# Patient Record
Sex: MALE | Race: WHITE | NOT HISPANIC OR LATINO | Employment: FULL TIME | URBAN - METROPOLITAN AREA
[De-identification: names, ages, dates, MRNs, and addresses within clinical notes are randomized per-mention and may not be internally consistent; named-entity substitution may affect disease eponyms.]

---

## 2019-04-16 ENCOUNTER — TRANSFERRED RECORDS (OUTPATIENT)
Dept: HEALTH INFORMATION MANAGEMENT | Facility: CLINIC | Age: 59
End: 2019-04-16

## 2019-04-24 ENCOUNTER — MEDICAL CORRESPONDENCE (OUTPATIENT)
Dept: HEALTH INFORMATION MANAGEMENT | Facility: CLINIC | Age: 59
End: 2019-04-24

## 2019-10-01 ENCOUNTER — HEALTH MAINTENANCE LETTER (OUTPATIENT)
Age: 59
End: 2019-10-01

## 2019-11-25 ENCOUNTER — TELEPHONE (OUTPATIENT)
Dept: SURGERY | Facility: CLINIC | Age: 59
End: 2019-11-25

## 2019-11-25 ENCOUNTER — OFFICE VISIT (OUTPATIENT)
Dept: SURGERY | Facility: CLINIC | Age: 59
End: 2019-11-25
Payer: COMMERCIAL

## 2019-11-25 ENCOUNTER — ALLIED HEALTH/NURSE VISIT (OUTPATIENT)
Dept: SURGERY | Facility: CLINIC | Age: 59
End: 2019-11-25
Payer: COMMERCIAL

## 2019-11-25 VITALS
BODY MASS INDEX: 34.17 KG/M2 | WEIGHT: 252.3 LBS | SYSTOLIC BLOOD PRESSURE: 152 MMHG | DIASTOLIC BLOOD PRESSURE: 87 MMHG | HEART RATE: 93 BPM | HEIGHT: 72 IN | OXYGEN SATURATION: 97 % | TEMPERATURE: 98.7 F

## 2019-11-25 DIAGNOSIS — E66.812 OBESITY, CLASS II, BMI 35-39.9: Primary | ICD-10-CM

## 2019-11-25 DIAGNOSIS — E66.9 OBESITY (BMI 30-39.9): ICD-10-CM

## 2019-11-25 DIAGNOSIS — E66.9 OBESITY: ICD-10-CM

## 2019-11-25 RX ORDER — LOSARTAN POTASSIUM 50 MG/1
50 TABLET ORAL DAILY
COMMUNITY
Start: 2019-01-07 | End: 2021-01-21

## 2019-11-25 RX ORDER — PRAVASTATIN SODIUM 40 MG
40 TABLET ORAL EVERY MORNING
COMMUNITY
Start: 2019-07-11 | End: 2021-01-21

## 2019-11-25 ASSESSMENT — PAIN SCALES - GENERAL: PAINLEVEL: NO PAIN (0)

## 2019-11-25 ASSESSMENT — MIFFLIN-ST. JEOR: SCORE: 2007.43

## 2019-11-25 NOTE — PATIENT INSTRUCTIONS
"Welcome to our weight management program!   We are excited to join you on your weight loss journey    Thank you for allowing us the privilege of caring for you. We hope we provided you with the excellent service you deserve.    Please let us know if there is anything else we can do so that we can be sure you are leaving completely satisfied with your care experience.    You saw RORO Hassan CNP today.    Instructions per today's visit:   We will try to get saxenda approved  If not, we will try for contrave   Work with dietitian monthly  See me after January 2020  Consider 24 week plan - I will mychart information     Interested in working with a health ?  Health coaches work with you to improve your overall health and wellbeing.  They look at the whole person, and may involve discussion of different areas of life, including, but not limited to the four pillars of health (sleep, exercise, nutrition, and stress management). Discuss with your care team if you would like to start working a health .  Health Coaching-3 Pack:    $99 for three health coaching visits    Visits may be done in person or via phone    Coaching is a partnership between the  and the client; Coaches do not prescribe or diagnose    Coaching helps inspire the client to reach his/her personal goals   10 Tips for Healthy Holidays:   1. Continue to eat 3 meals daily and avoid snacks. Do not skip meals to \"save\" calories for holiday meal, you will likely overeat.   2. Eat slowly and stop as soon as you are satisfied.   3. Stay away from the buffet table or appetizers prior to the meal. This leads to snacking between meals or filling up on snacks prior to the meal, which can lead to overeating.   4. During the meal eat your protein first, then the vegetables and last the starchy dishes. Again remember to stop as soon as you feel satisfied; it is okay to leave food on the plate.   5. Try water, non-alcoholic wine (does have calories), " crystal light, milan and other low caloric beverage, put them in a fancy glass with a slice of lemon or lime to make them look nice.    6. Drink plenty of water.  7. Bring a dish to share, therefore you know that there will be a healthier option to eat.   8. Continue your current exercise routine, it is easier to fall out of your exercise routine than to get back into the habit of exercising after the holidays. Try to do a family activity outside or ask if anyone wants to take a walk pre or post meal.   9. Be kind to yourself, if you over indulged that's okay, all is not lost. Re-commit to healthy eating habits, forgive yourself and move on.   10. Try new traditions like adding in another vegetable dish or trying a healthier version of family favorites.      For any questions/concerns contact Luz Elena Garcia LPN at [phone] or Saba Gresham RN at [phone]    To schedule appointments with our team, please call [phone]     Please call during clinic hours Monday through Friday 8:00a - 4:00p if you have questions or you can contact us via [employer] at anytime. ?    Lab results will be communicated through My Chart or letter (if My Chart not used). Please call the clinic if you have not received communication after 1 week or if you have any questions.?    Fax: [phone]    Thank you,  Medical Weight Management Team     MEDICATION STARTED AT THIS APPOINTMENT    We are starting a GLP-1 (Glucagon-like Peptide-1) medication called Saxenda. One of the ways it works is by slowing down the rate that food leaves your stomach. You feel wilkins and will eat less. It also helps regulate hormones that can help improve your blood sugars.    If you are a patient that checks blood sugars, continue to check as instructed by your doctor. Low blood sugars are rare but can happen if patients are on insulin or other oral agents. If you notice consistent low sugars or high sugars, your medication may need to be adjusted after your  appointment. If this is the case, please call RN and provide her your blood sugar record from the last 3-4 days. The RN will get in touch with the doctor and call you back/[employer] message with recommendations. We tolerate high sugars for a bit, so if sugars are running 180-200, this is ok. As weight starts dropping the blood sugars should too. If readings are consistently over 200 for 1-2 weeks, then you should call the doctor/nurse.    Dosing for this medication:   Week 1- Inject 0.6 mg daily  Week 2- Inject 1.2 mg daily  Week 3- Inject 1.8 mg daily  Week 4- Inject 2.4 mg daily  Week 5 and thereafter- Inject 3.0 mg daily    Side effects of GLP- Medications include: The most common side effects are all GI related and consist of: nausea, constipation, diarrhea, burping, or gassiness. Patients are advised to eat slowly and less, and nausea typically passes if people can stick it out.     The risk of pancreatitis (inflammation of the pancreas) has been associated with this type of medication, but is very rare.  If you have had pancreatitis in the past, this medication may not be for you. Please let us know about any past history of pancreas problems.    Symptoms of pancreatitis include: Pain in your upper stomach area which may travel to your back and be worse after eating. Your stomach area may be tender to the touch.  You may have vomiting or nausea and/or have a fever. If you should develop any of these symptoms, stop the medication and contact your primary care doctor. They will do a blood test to check for pancreatitis.         There is a small chance you may have some low blood sugar after taking the medication.   The signs of low blood sugar are:  o Weakness  o Shaky   o Hungry  o Sweating  o Confusion      See below for ways to treat low blood sugar without adding in lots of extra calories.      Treating Low Blood Sugar    If you have symptoms of low blood sugar (sweating, shaking, dizzy, confused) eat 15 grams  of carbs and wait 15 minutes:      Glucose Tabs are best for sugars under 70 -  Dex4 or BD Glucose tablets are good, you will need to take 3-4 of these to equal 15 grams.       One small box of raisins    4 oz fruit juice box or   cup fruit juice    1 small apple    1 small banana      cup canned fruit in water      English muffin or a slice of bread with jelly     1 low fat frozen waffle with sugar-free syrup      cup cottage cheese with   cup frozen or fresh blueberries    1 cup skim or low-fat milk      cup whole grain cereal    4-6 crackers such as Triscuits      This medication is usually not covered by insurance and can be quite expensive. Sometimes a prior authorization is required, which may take up to 1-2 weeks for an insurance company to make a decision if they will cover the medication. Please be patient, you will be notified after a decision has been made.    Call the nurse at [phone] if you have any questions or concerns. (Do not stop taking it if you don't think it's working. For some people it works without them knowing it.)     In order to get refills of this or any medication we prescribe you must be seen in the medical weight mgmt clinic every 2-4 months. Please have your pharmacy fax a refill request to [phone].

## 2019-11-25 NOTE — PROGRESS NOTES
"    New Medical Weight Management Consult    PATIENT:  Speedy Carlson  MRN:         [id number]  :         1960  CAMILO:         2019    Dear Dr. Mcmahon ([facility])     I had the pleasure of seeing your patient, Speedy Carlson.  Full intake/assessment done to determine barriers to weight loss success and develop a treatment plan.  Speedy Carlson is a 58 year old male interested in treatment of medical problems associated with weight.  His weight today is 252 lbs 4.8 oz, Body mass index is 34.22 kg/m ., and he has the following co-morbidities:     2019   I have the following co-morbidities associated with obesity: Heart Disease, High Blood Pressure, High Cholesterol, Sleep Apnea     HANNAH: CPAP prescribed, used for a while but didn't tolerate   HTN: just in the last few months, started losartan otherwise it has been well control   Fasting blood sugar 99 2019      Patient Goals Reviewed With Patient 2019   I am interested in attaining a healthier weight to diminish current health problems related to co-morbid conditions: Yes   I am interested in attaining a healthier weight in order to prevent future health problems: Yes       Referring Provider 2019   Please name the provider who referred you to Medical Weight Management.  If you do not know, please answer: \"I Don't Know\". N/A       Wt Readings from Last 4 Encounters:   19 114.4 kg (252 lb 4.8 oz)   14 98.8 kg (217 lb 14.4 oz)   13 98.7 kg (217 lb 11.2 oz)   08/10/10 102.2 kg (225 lb 3.2 oz)       Weight History Reviewed With Patient 2019   How concerned are you about your weight? Very Concerned   Would you describe your weight gain as gradual? Yes   I became overweight: As an Adult   The following factors have contributed to my weight gain:  Eating Wrong Types of Food, Eating Too Much, Lack of Exercise, Genetic (Runs in the Family)   I have tried the following methods to lose weight: Exercise, Medications   I have " the following family history of obesity/being overweight:  My mother is overwieght, One or more of my siblings are overweight   Has anyone in your family had weight loss surgery? No     Around age 41- started struggling  Was running pretty consistently up until 2 years ago   Accident with disc injury in the last year decrease mobility   Highest weight in life currently     A few years ago, lost 20lbs with diet/ exercise and taking contrave over 5 months - had some teeth grinding while taking this, has since stopped     Wants to prevent further co morbidities and feel healthier overall     Diet Recall Reviewed With Patient 11/25/2019   How many glasses of juice do you drink in a typical day? 1   How many of glasses of milk do you drink in a typical day? 0   How many 8oz glasses of sugar containing drinks such as Doug-Aid/sweet tea do you drink in a day? 0   How many cans/bottles of sugar pop/soda/tea/sports drinks do you drink in a day? 0   How many cans/bottles of diet pop/soda/tea or sports drink do you drink in a day? 1   How often do you have a drink of alcohol? 2-4 Times a Month   If you do drink, how many drinks might you have in a day? 5-6         Eating Habits Reviewed With Patient 11/25/2019   Generally, my meals include foods like these: bread, pasta, rice, potatoes, corn, crackers, sweet dessert, pop, or juice. Everyday   Generally, my meals include foods like these: fried meats, brats, burgers, french fries, pizza, cheese, chips, or ice cream. A Few Times a Week   Eat fast food (like [employer], [employer], Taco Bell). Never   Eat at a buffet or sit-down restaurant. A Few Times a Week   Eat most of my meals in front of the TV or computer. Half of the Week   Often skip meals, eat at random times, have no regular eating times. A Few Times a Week   Rarely sit down for a meal but snack or graze throughout.  A Few Times a Week   Eat extra snacks between meals. Almost Everyday   Eat most of my food at the end  of the day. Almost Everyday   Eat in the middle of the night or wake up at night to eat. Never   Eat extra snacks to prevent or correct low blood sugar. Never   Eat to prevent acid reflux or stomach pain. Never   Worry about not having enough food to eat. Never   Have you been to the food shelf at least a few times this year? No   I eat when I am depressed, stressed, anxious, or bored. A Few Times a Week   I eat when I am happy or as a reward. A Few Times a Week   I feel hungry all the time even if I just have eaten. Almost Everyday   Feeling full is important to me. Almost Everyday   Once I start eating, it is hard to stop. Everyday   I finish all the food on my plate even if I am already full. Everyday   I can't resist eating delicious food or walk past the good food/smell. A Few Times a Week   I eat/snack without noticing that I am eating. Never   I eat when I am preparing the meal. Never   I eat more than usual when I see others eating. Never   I have trouble not eating sweets, ice cream, cookies, or chips if they are around the house. Everyday   I think about food all day. Never   What foods, if any, do you crave? Sweets/Candy/Chocolate   Please list any other foods you crave? Pasta, pizza, tacos, asian, sweets. I believe I have a sugar addition.   I feel out of control when eating. Almost Everyday   I eat a large amount of food, like a loaf of bread, a box of cookies, a pint/quart of ice cream, all at once. Never   I eat a large amount of food even when I am not hungry. Monthly   I eat rapidly. Almost Everyday   I eat alone because I feel embarrassed and do not want others to see how much I have eaten. Monthly   I eat until I am uncomfortably full. Weekly   I feel bad, disgusted, or guilty after I overeat. Almost Everyday   I make myself vomit what I have eaten or use laxatives to get rid of food. Never     Protein shake in the breakfast during the week, out for breakfast on the weekends   Eats lunch and  "dinner   Portion control   Craves sugar after meals  Feels \"addicted to sugar\"   Craves chocolate and cookies  Cravings worse after certain meals   Doesn't \"like a lot of food\" - loves pasta and pizza, eats a lot of sandwiches     Cardiology screening in the past recommended red meat 3 times a month or less, decrease carbohydrates       Activity/Exercise History Reviewed With Patient 11/25/2019   How much of a typical 12 hour day do you spend sitting? Most of the Day   How much of a typical 12 hour day do you spend lying down? Less Than Half the Day   How much of a typical day do you spend walking/standing? Less Than Half the Day   How many hours (not including work) do you spend on the TV/Video Games/Computer/Tablet/Phone? 4-5 Hours   How many times a week are you active for the purpose of exercise? 2-3 Times a Week   How many total minutes do you spend doing some activity for the purpose of exercising when you exercise? More Than 30 Minutes   What keeps you from being more active? Other       PAST MEDICAL HISTORY:  Past Medical History:   Diagnosis Date     Acute prostatitis 12/04     Calculus of kidney 1994    no recurrence     Mild intermittent asthma     hx in childhood     Mixed hyperlipidemia      Other acne      Varicella without mention of complication        Work/Social History Reviewed With Patient 11/25/2019   My employment status is: Full-Time   My job is:    How much of your job is spent on the computer or phone? 75%   What is your marital status? /In a Relationship   If in a relationship, is your significant other overweight? Yes   Do you have children? Yes   If you have children, are they overweight? No       Mental Health History Reviewed With Patient 11/25/2019   Have you ever been physically or sexually abused? No   How often in the past 2 weeks have you felt little interest or pleasure in doing things? Not at all   Over the past 2 weeks how often have you felt down, " depressed, or hopeless? Not at all       Sleep History Reviewed With Patient 11/25/2019   How many hours do you sleep at night? 7   Do you think that you snore loudly or has anybody ever heard you snore loudly (louder than talking or so loud it can be heard behind a shut door)? Yes   Has anyone seen or heard you stop breathing during your sleep? Yes   Do you often feel tired, fatigued, or sleepy during the day? Yes       MEDICATIONS:   Current Outpatient Medications   Medication Sig Dispense Refill     aspirin 81 MG tablet Take 81 mg by mouth daily       fenofibrate 145 MG tablet Take  by mouth daily.       liraglutide - Weight Management (SAXENDA) 18 MG/3ML pen Week 1: 0.6 mg subcutaneous daily, Week 2: 1.2 mg daily, Week 3: 1.8 mg daily, Week 4: 2.4 mg daily, Week 5 & on: 3 mg daily 15 mL 1     losartan (COZAAR) 50 MG tablet Take 50 mg by mouth       pravastatin (PRAVACHOL) 40 MG tablet Take 40 mg by mouth       zolpidem (AMBIEN) 5 MG tablet   1     ambien once or twice a week     ALLERGIES:   Allergies   Allergen Reactions     No Known Allergies        PHYSICAL EXAM:  BP (!) 152/87 (BP Location: Left arm, Patient Position: Sitting, Cuff Size: Adult Large)   Pulse 93   Temp 98.7  F (37.1  C) (Oral)   Ht 1.829 m (6')   Wt 114.4 kg (252 lb 4.8 oz)   SpO2 97%   BMI 34.22 kg/m     A & O x 3  HEENT: NCAT, mucous membranes moist  Respirations unlabored  Location of obesity: Central Obesity    ASSESSMENT:  Speedy is a patient with mature onset obesity with significant element of familial/genetic influence and with current health consequences. He does need aggressive weight loss plan due to BMI 32, family history, comorbidities.  Speedy Carlson eats a high carb diet, eats a high fat diet and mostly eats during the evening.    His problem is complicated by strong craving/reward pathways    His ability to lose weight is impacted by lack of education on nutrition and dietary needs.    PLAN:    No meal  skipping  Dietician visit of education  Meal planning  Sleep apnea adherence encouraged    Aggressive  Ancillary testing:  Encouraged CPAP and sleep hygiene .  Food Plan:  Dietitian monthly .   Activity Plan:  Continue to work on increasing frequency .  Supplementary: discussed 24 week plan, discussed health coaching  Medication:  The patient will begin medication in pursuit of improved medical status as influenced by body weight. He will start saxenda.  There is a mutual understanding of the goals and risks of this therapy. The patient is in agreement. He is educated on dosage regimen and possible side effects.    Did discuss possible contrave if saxenda not covered. Did notice benefit from contrave in the past but had some night time teeth grinding with it. Does have a mouth guard now that he notes he could wear if it was an issue. Considered topiramate but has a distant history of kidney stones (once 25 years ago), could consider in the future. Newer HTN and 58, would not choose phentermine first.     No orders of the defined types were placed in this encounter.      RTC:    8 weeks.    TIME: 45 min spent on evaluation, management, counseling, education, & motivational interviewing with greater than 50 % of the total time was spent on counseling and coordinating care    Sincerely,    Shaila Wooten NP

## 2019-11-25 NOTE — PROGRESS NOTES
New Weight Management Nutrition Consultation    Speedy Carlson is a 58 year old male presents today for new weight management nutrition consultation.  Patient referred by Shaila Wooten NP on November 25, 2019.    Patient with Co-morbidities of obesity including:  Type II DM no  Renal Failure no  Sleep apnea no  Hypertension no   Dyslipidemia yes  Joint pain no  Back pain no  GERD no     Anthropometrics:  Estimated body mass index is 34.22 kg/m  as calculated from the following:    Height as of an earlier encounter on 11/25/19: 1.829 m (6').    Weight as of an earlier encounter on 11/25/19: 114.4 kg (252 lb 4.8 oz).    Medications for Weight Loss:   Saxenda - starting today     NUTRITION HISTORY  See MD note for details.    Pt reports he craves sugar, he believes that he is addicted to sugar. After lunch or dinner (once a day) he feels as though he needs to have something sweet; he will eat cookies or 6-7 pieces of chocolate. He craves the sweets about 15 minutes after his meal, if he does not have sweet right after the meal he does not need to have any sweets. Notices that he wants sweets more after a spicy meal.     He is aware that he will consume larger portion sizes, he does not think about food during the day, however, if there is food sitting in front of him he will eat it.      His wife does not cook, he does not mind cooking but does not do it often. Many times they will go out to eat or have take out.    Recent food recall:  Breakfast: 3 oz OJ a protein shake Week: mcdaniels and eggs.   Lunch: bagel with cheese turkey and veggies and a bag of chips   Dinner: pizza pasta, asian, take out (do not do a lot of cooking) wife does not cook at all. Taco   Could eat the same thing everyday, food does not interest me.   Snacks: chocolate or sweet once a day.   Beverages: Bubbly, not much soda ( one per day if that) diet coke, water   Alcohol: once a week.   Dining out: daily     MALNUTRITION  % Intake: No decreased  "intake noted  % Weight Loss: None noted  Subcutaneous Fat Loss: None observed  Muscle Loss: None observed  Fluid Accumulation/Edema: None noted  Malnutrition Diagnosis: Patient does not meet two of the established criteria necessary for diagnosing malnutrition    Nutrition Prescription  Recommended energy/nutrient modification.    Nutrition Diagnosis  Obesity r/t long history of self-monitoring deficit and excessive energy intake aeb BMI >30.    Nutrition Intervention  Materials/education provided on Volumetric eating to help satiety level on fewer calories; portion control and healthy food choices (Plate Method and Volumetrics handouts), 100 calorie snack choices, meal and snack planning and websites, sample meal plans.    Patient Understanding: good  Expected Compliance: good  Follow-Up Plans: 1 month      Nutrition Goals  1) Distract yourself 15 minutes, (read a book, do laundry, watch tv, call friends)  2) limits sweets 1-2 per sitting   3) Eating our meals slowly 20-30 minutes  4) 1/2 your meal should be non starchy vegetables.   5) Box up half meal when going out to eat.     https://[employer]/soh-eq-ibium-vegetables/  Healthy Recipe Resources:  \"The Volumetrics Eating Plan\" by Shonda Naylor, Ph.D.  www.[employer].com  www.[employer]  www.[employer].org  Dash Diet Recipes [facility]  www.extension.[facility] - the recipe box  \"Cooking that Counts\" by editors of [employer]  https://www.[facility]/communityculinary/[facility]_Cookbook_KT_Final_11-6_NoCrops-compressed.pdf      Follow-Up:  1 month     Time spent with patient: 30 minutes.  Waleska Rivera, MS, RD, LD      "

## 2019-11-25 NOTE — PATIENT INSTRUCTIONS
"Nutrition Goals  1) Distract yourself 15 minutes, (read a book, do laundry, watch tv, call friends)  2) limits sweets 1-2 per sitting   3) Eating our meals slowly 20-30 minutes  4) 1/2 your meal should be non starchy vegetables.   5) Box up half meal when going out to eat.     https://[employer]/qje-xp-rtkag-vegetables/  Healthy Recipe Resources:  \"The Volumetrics Eating Plan\" by Shonda Naylor, Ph.D.  www.[employer].com  www.[employer]  www.[employer]  Dash Diet Recipes [facility]  www.extension.[facility] - the recipe box  \"Cooking that Counts\" by editors of [employer]  https://www.[facility]/communityculinary/[facility]_Cookbook_KT_Final_11-6_NoCrops-compressed.pdf    Follow up with RD in one month    Waleska Rivera, MS, RD, LD  If you need to schedule or reschedule with a dietitian please call [phone].  "

## 2019-11-25 NOTE — LETTER
"2019       RE: Speedy Carlson  [address] [facility] [address]     Dear Colleague,    Thank you for referring your patient, Speedy Carlson, to the [facility] SURGICAL WEIGHT MANAGEMENT at [facility] acute [facility]. Please see a copy of my visit note below.        New Medical Weight Management Consult    PATIENT:  Speedy Carlson  MRN:         [id number]  :         1960  CAMILO:         2019    Dear Dr. Mcmahon ([facility])     I had the pleasure of seeing your patient, Speedy Carlson.  Full intake/assessment done to determine barriers to weight loss success and develop a treatment plan.  Speedy Carlson is a 58 year old male interested in treatment of medical problems associated with weight.  His weight today is 252 lbs 4.8 oz, Body mass index is 34.22 kg/m ., and he has the following co-morbidities:     2019   I have the following co-morbidities associated with obesity: Heart Disease, High Blood Pressure, High Cholesterol, Sleep Apnea     HANNAH: CPAP prescribed, used for a while but didn't tolerate   HTN: just in the last few months, started losartan otherwise it has been well control   Fasting blood sugar 99 2019      Patient Goals Reviewed With Patient 2019   I am interested in attaining a healthier weight to diminish current health problems related to co-morbid conditions: Yes   I am interested in attaining a healthier weight in order to prevent future health problems: Yes       Referring Provider 2019   Please name the provider who referred you to Medical Weight Management.  If you do not know, please answer: \"I Don't Know\". N/A       Wt Readings from Last 4 Encounters:   19 114.4 kg (252 lb 4.8 oz)   14 98.8 kg (217 lb 14.4 oz)   13 98.7 kg (217 lb 11.2 oz)   08/10/10 102.2 kg (225 lb 3.2 oz)       Weight History Reviewed With Patient 2019   How concerned are you about your weight? Very Concerned   Would you " describe your weight gain as gradual? Yes   I became overweight: As an Adult   The following factors have contributed to my weight gain:  Eating Wrong Types of Food, Eating Too Much, Lack of Exercise, Genetic (Runs in the Family)   I have tried the following methods to lose weight: Exercise, Medications   I have the following family history of obesity/being overweight:  My mother is overwieght, One or more of my siblings are overweight   Has anyone in your family had weight loss surgery? No     Around age 41- started struggling  Was running pretty consistently up until 2 years ago   Accident with disc injury in the last year decrease mobility   Highest weight in life currently     A few years ago, lost 20lbs with diet/ exercise and taking contrave over 5 months - had some teeth grinding while taking this, has since stopped     Wants to prevent further co morbidities and feel healthier overall     Diet Recall Reviewed With Patient 11/25/2019   How many glasses of juice do you drink in a typical day? 1   How many of glasses of milk do you drink in a typical day? 0   How many 8oz glasses of sugar containing drinks such as Doug-Aid/sweet tea do you drink in a day? 0   How many cans/bottles of sugar pop/soda/tea/sports drinks do you drink in a day? 0   How many cans/bottles of diet pop/soda/tea or sports drink do you drink in a day? 1   How often do you have a drink of alcohol? 2-4 Times a Month   If you do drink, how many drinks might you have in a day? 5-6         Eating Habits Reviewed With Patient 11/25/2019   Generally, my meals include foods like these: bread, pasta, rice, potatoes, corn, crackers, sweet dessert, pop, or juice. Everyday   Generally, my meals include foods like these: fried meats, brats, burgers, french fries, pizza, cheese, chips, or ice cream. A Few Times a Week   Eat fast food (like McDonalds, BurSnapcious Minh, Taco Bell). Never   Eat at a buffet or sit-down restaurant. A Few Times a Week   Eat most  of my meals in front of the TV or computer. Half of the Week   Often skip meals, eat at random times, have no regular eating times. A Few Times a Week   Rarely sit down for a meal but snack or graze throughout.  A Few Times a Week   Eat extra snacks between meals. Almost Everyday   Eat most of my food at the end of the day. Almost Everyday   Eat in the middle of the night or wake up at night to eat. Never   Eat extra snacks to prevent or correct low blood sugar. Never   Eat to prevent acid reflux or stomach pain. Never   Worry about not having enough food to eat. Never   Have you been to the food shelf at least a few times this year? No   I eat when I am depressed, stressed, anxious, or bored. A Few Times a Week   I eat when I am happy or as a reward. A Few Times a Week   I feel hungry all the time even if I just have eaten. Almost Everyday   Feeling full is important to me. Almost Everyday   Once I start eating, it is hard to stop. Everyday   I finish all the food on my plate even if I am already full. Everyday   I can't resist eating delicious food or walk past the good food/smell. A Few Times a Week   I eat/snack without noticing that I am eating. Never   I eat when I am preparing the meal. Never   I eat more than usual when I see others eating. Never   I have trouble not eating sweets, ice cream, cookies, or chips if they are around the house. Everyday   I think about food all day. Never   What foods, if any, do you crave? Sweets/Candy/Chocolate   Please list any other foods you crave? Pasta, pizza, tacos, asian, sweets. I believe I have a sugar addition.   I feel out of control when eating. Almost Everyday   I eat a large amount of food, like a loaf of bread, a box of cookies, a pint/quart of ice cream, all at once. Never   I eat a large amount of food even when I am not hungry. Monthly   I eat rapidly. Almost Everyday   I eat alone because I feel embarrassed and do not want others to see how much I have eaten.  "Monthly   I eat until I am uncomfortably full. Weekly   I feel bad, disgusted, or guilty after I overeat. Almost Everyday   I make myself vomit what I have eaten or use laxatives to get rid of food. Never     Protein shake in the breakfast during the week, out for breakfast on the weekends   Eats lunch and dinner   Portion control   Craves sugar after meals  Feels \"addicted to sugar\"   Craves chocolate and cookies  Cravings worse after certain meals   Doesn't \"like a lot of food\" - loves pasta and pizza, eats a lot of sandwiches     Cardiology screening in the past recommended red meat 3 times a month or less, decrease carbohydrates       Activity/Exercise History Reviewed With Patient 11/25/2019   How much of a typical 12 hour day do you spend sitting? Most of the Day   How much of a typical 12 hour day do you spend lying down? Less Than Half the Day   How much of a typical day do you spend walking/standing? Less Than Half the Day   How many hours (not including work) do you spend on the TV/Video Games/Computer/Tablet/Phone? 4-5 Hours   How many times a week are you active for the purpose of exercise? 2-3 Times a Week   How many total minutes do you spend doing some activity for the purpose of exercising when you exercise? More Than 30 Minutes   What keeps you from being more active? Other       PAST MEDICAL HISTORY:  Past Medical History:   Diagnosis Date     Acute prostatitis 12/04     Calculus of kidney 1994    no recurrence     Mild intermittent asthma     hx in childhood     Mixed hyperlipidemia      Other acne      Varicella without mention of complication        Work/Social History Reviewed With Patient 11/25/2019   My employment status is: Full-Time   My job is:    How much of your job is spent on the computer or phone? 75%   What is your marital status? /In a Relationship   If in a relationship, is your significant other overweight? Yes   Do you have children? Yes   If you have " children, are they overweight? No       Mental Health History Reviewed With Patient 11/25/2019   Have you ever been physically or sexually abused? No   How often in the past 2 weeks have you felt little interest or pleasure in doing things? Not at all   Over the past 2 weeks how often have you felt down, depressed, or hopeless? Not at all       Sleep History Reviewed With Patient 11/25/2019   How many hours do you sleep at night? 7   Do you think that you snore loudly or has anybody ever heard you snore loudly (louder than talking or so loud it can be heard behind a shut door)? Yes   Has anyone seen or heard you stop breathing during your sleep? Yes   Do you often feel tired, fatigued, or sleepy during the day? Yes       MEDICATIONS:   Current Outpatient Medications   Medication Sig Dispense Refill     aspirin 81 MG tablet Take 81 mg by mouth daily       fenofibrate 145 MG tablet Take  by mouth daily.       liraglutide - Weight Management (SAXENDA) 18 MG/3ML pen Week 1: 0.6 mg subcutaneous daily, Week 2: 1.2 mg daily, Week 3: 1.8 mg daily, Week 4: 2.4 mg daily, Week 5 & on: 3 mg daily 15 mL 1     losartan (COZAAR) 50 MG tablet Take 50 mg by mouth       pravastatin (PRAVACHOL) 40 MG tablet Take 40 mg by mouth       zolpidem (AMBIEN) 5 MG tablet   1     ambien once or twice a week     ALLERGIES:   Allergies   Allergen Reactions     No Known Allergies        PHYSICAL EXAM:  BP (!) 152/87 (BP Location: Left arm, Patient Position: Sitting, Cuff Size: Adult Large)   Pulse 93   Temp 98.7  F (37.1  C) (Oral)   Ht 1.829 m (6')   Wt 114.4 kg (252 lb 4.8 oz)   SpO2 97%   BMI 34.22 kg/m      A & O x 3  HEENT: NCAT, mucous membranes moist  Respirations unlabored  Location of obesity: Central Obesity    ASSESSMENT:  Speedy is a patient with mature onset obesity with significant element of familial/genetic influence and with current health consequences. He does need aggressive weight loss plan due to BMI 32, family history,  comorbidities.  Speedy Carlson eats a high carb diet, eats a high fat diet and mostly eats during the evening.    His problem is complicated by strong craving/reward pathways    His ability to lose weight is impacted by lack of education on nutrition and dietary needs.    PLAN:    No meal skipping  Dietician visit of education  Meal planning  Sleep apnea adherence encouraged    Aggressive  Ancillary testing:  Encouraged CPAP and sleep hygiene .  Food Plan:  Dietitian monthly .   Activity Plan:  Continue to work on increasing frequency .  Supplementary: discussed 24 week plan, discussed health coaching  Medication:  The patient will begin medication in pursuit of improved medical status as influenced by body weight. He will start saxenda.  There is a mutual understanding of the goals and risks of this therapy. The patient is in agreement. He is educated on dosage regimen and possible side effects.    Did discuss possible contrave if saxenda not covered. Did notice benefit from contrave in the past but had some night time teeth grinding with it. Does have a mouth guard now that he notes he could wear if it was an issue. Considered topiramate but has a distant history of kidney stones (once 25 years ago), could consider in the future. Newer HTN and 58, would not choose phentermine first.     No orders of the defined types were placed in this encounter.      RTC:    8 weeks.    TIME: 45 min spent on evaluation, management, counseling, education, & motivational interviewing with greater than 50 % of the total time was spent on counseling and coordinating care    Sincerely,    Shaila Wooten NP        Again, thank you for allowing me to participate in the care of your patient.      Sincerely,    Shaila Wooten NP

## 2019-11-25 NOTE — LETTER
Date:November 27, 2019      Patient was self referred, no letter generated. Do not send.        [facility] Health Information

## 2019-11-25 NOTE — TELEPHONE ENCOUNTER
Prior Authorization Retail Medication Request    Medication/Dose: Saxenda  ICD code (if different than what is on RX):    Previously Tried and Failed:  Exercise, Medications  Rationale:  Heart Disease, High Blood Pressure, High Cholesterol, Sleep Apnea    Insurance Name:  -[employer] OPEN ACCESS Ph: [phone]   Insurance ID:[id number]        Pharmacy Information (if different than what is on RX)  Name:  [id number] [address]  Phone:  [phone]

## 2019-11-25 NOTE — NURSING NOTE
(   Chief Complaint   Patient presents with     Consult     New weight management visit.    )    ( Weight: 114.4 kg (252 lb 4.8 oz) )  ( Height: 182.9 cm (6') )  ( BMI (Calculated): 34.22 )  ( Initial Weight: 114.4 kg (252 lb 4.8 oz) )  ( Cumulative weight loss (lbs): 0 )  (   )  (   )  ( Waist Circumference (cm): 121 cm )  (   )    ( BP: (!) 152/87 )  (   )  ( Temp: 98.7  F (37.1  C) )  ( Temp src: Oral )  ( Pulse: 93 )  (   )  ( SpO2: 97 % )    (   Patient Active Problem List   Diagnosis     Other acne     Mixed hyperlipidemia     Prostatitis     Tear film insufficiency     HYPERLIPIDEMIA LDL GOAL <130    )  (   Current Outpatient Medications   Medication Sig Dispense Refill     aspirin 81 MG tablet Take 81 mg by mouth daily       fenofibrate 145 MG tablet Take  by mouth daily.       losartan (COZAAR) 50 MG tablet Take 50 mg by mouth       pravastatin (PRAVACHOL) 40 MG tablet Take 40 mg by mouth       zolpidem (AMBIEN) 5 MG tablet   1    )  ( Diabetes Eval:    )    ( Pain Eval:  No Pain (0) )    ( Wound Eval:       )    (   History   Smoking Status     Never Smoker   Smokeless Tobacco     Never Used     Comment: cigar occ    )    ( Signed By:  Elo Joshi CMA; November 25, 2019; 12:18 PM )

## 2019-11-26 NOTE — TELEPHONE ENCOUNTER
PA Initiation    Medication: liraglutide - Weight Management (SAXENDA) 18 MG/3ML pen  Insurance Company: [employer] - Phone [phone] Fax [phone]  Pharmacy Filling the Rx: [id number] - [place], [place] - [address] [place] [address]  Filling Pharmacy Phone: [phone]  Filling Pharmacy Fax: [phone]  Start Date: 11/26/2019

## 2019-11-30 NOTE — TELEPHONE ENCOUNTER
PRIOR AUTHORIZATION DENIED    Medication: liraglutide - Weight Management (SAXENDA) 18 MG/3ML pen - DENIED    Denial Date: 11/29/2019    Denial Rational: Patient does NOT meet criteria details below    Appeal Information: Eligible for appeal within 180 days of notice

## 2020-01-02 ENCOUNTER — TELEPHONE (OUTPATIENT)
Dept: ENDOCRINOLOGY | Facility: CLINIC | Age: 60
End: 2020-01-02

## 2020-01-02 ENCOUNTER — ALLIED HEALTH/NURSE VISIT (OUTPATIENT)
Dept: PHARMACY | Facility: CLINIC | Age: 60
End: 2020-01-02
Payer: COMMERCIAL

## 2020-01-02 DIAGNOSIS — E66.09 CLASS 1 OBESITY DUE TO EXCESS CALORIES WITH SERIOUS COMORBIDITY AND BODY MASS INDEX (BMI) OF 34.0 TO 34.9 IN ADULT: Primary | ICD-10-CM

## 2020-01-02 DIAGNOSIS — E66.811 CLASS 1 OBESITY DUE TO EXCESS CALORIES WITH SERIOUS COMORBIDITY AND BODY MASS INDEX (BMI) OF 34.0 TO 34.9 IN ADULT: Primary | ICD-10-CM

## 2020-01-02 DIAGNOSIS — G47.00 INSOMNIA, UNSPECIFIED TYPE: ICD-10-CM

## 2020-01-02 DIAGNOSIS — I10 BENIGN ESSENTIAL HYPERTENSION: ICD-10-CM

## 2020-01-02 DIAGNOSIS — Z79.82 ASPIRIN LONG-TERM USE: ICD-10-CM

## 2020-01-02 DIAGNOSIS — E78.5 HYPERLIPIDEMIA LDL GOAL <130: ICD-10-CM

## 2020-01-02 PROCEDURE — 99605 MTMS BY PHARM NP 15 MIN: CPT | Performed by: PHARMACIST

## 2020-01-02 PROCEDURE — 99607 MTMS BY PHARM ADDL 15 MIN: CPT | Performed by: PHARMACIST

## 2020-01-02 RX ORDER — TRAZODONE HYDROCHLORIDE 50 MG/1
150 TABLET, FILM COATED ORAL AT BEDTIME
COMMUNITY
Start: 2019-12-07 | End: 2021-01-21

## 2020-01-02 NOTE — PROGRESS NOTES
"SUBJECTIVE/OBJECTIVE:                           Speedy Carlson is a 59 year old male called for an initial visit for Medication Therapy Management.  He was referred to me from Shaila Wooten CNP.    Chief Complaint: Would like to discuss next steps for weight loss medications.    Allergies/ADRs: Reviewed in Epic  Tobacco:  reports that he has never smoked. He has never used smokeless tobacco.  Alcohol: 1 beverage every 1-2 weeks  Caffeine: 1 cups/day of coffee  Activity: see below  PMH: Reviewed in Epic    Medication Adherence/Access:  Patient uses pill box(es).  Patient takes medications 2 time(s) per day.   Per patient, misses medication 0 times per week. He states that he has a good regimen and never misses doses of medication.     Obesity:  No medications currently.     Followed by Shaila Wooten NP, last seen 11/25/2019 for New Medical Weight Management. Was prescribed Saxenda, but difficultly tolerating due to nausea/abdominal pain so stopped. He did give try in changing time of day and going back to lower dose of Saxenda but still didn't tolerate, so stopped. This was discussed with team and team aware.  Previously tried Contrave. He founds that Contrave worked very well, lost 25 lb but found that he was grinding teeth and was bothered by this. Got night gaurd for period of time, but stopped medication for short period of time. He does have history of singular incident of kidney stone 25 years ago. He has not tried other medication(s) for weight loss.   Weight History: Around age 41 started struggling with weight loss. He worries about his health due to family history of heart issues. He wants to try and get healthier due to this.  Diet/Eating Habits: Patient reports that for him his largest issues are cravings/snacking. He feels that he has an \"almost addiction to sugars\".    Exercise/Activity: Patient reports that he tries to work out 3 times weekly for 1 hour each, ellilptical usually. He will also lift weights " during that times.    Failed medications include: Saxenda: GI upset, nausea, abdominal pain.     Initial Consult Weight 11/25/2019: 252 lb 4.8 oz     Wt Readings from Last 4 Encounters:   11/25/19 252 lb 4.8 oz (114.4 kg)   08/12/14 217 lb 14.4 oz (98.8 kg)   01/04/13 217 lb 11.2 oz (98.7 kg)   08/10/10 225 lb 3.2 oz (102.2 kg)     Estimated body mass index is 34.22 kg/m  as calculated from the following:    Height as of 11/25/19: 6' (1.829 m).    Weight as of 11/25/19: 252 lb 4.8 oz (114.4 kg).    Hypertension:   Losartan 50 mg daily.     Patient does self-monitor BP. Home BP monitoring in range of 130's systolic over 80's diastolic. Rarely will be 140s systolic. Patient reports no current medication side effects.  BP Readings from Last 3 Encounters:   11/25/19 (!) 152/87   08/12/14 128/84   01/04/13 134/78     Hyperlipidemia/Primary Prevention:   Pravastatin 40mg once daily    Fenofibrate 145 mg daily   Aspirin 81 mg daily     Pt reports no significant myalgias or other side effects. He says he is going to have physical scheduled with PCP and to get labs rechecked. Brother has history of heart attack, 5 vessel CABG. Another brother was found to have mild coronary artery disease and coronary spasm. Mother suffered 2 heart attacks and stenting in last 60s/early 70s, also had 2 TIAs.  No issues of bleeding or bruising.      No results for input(s): CHOL, HDL, LDL, TRIG, CHOLHDLRATIO in the last 50069 hours.  Last lipid panel from care everywhere was from 1/12/2018.     Insomnia:   Zolpidem 5 mg nightly PRN.   Trazodone 100 mg nightly.     Gets about 7 hours of sleep each night. He also has sleep apnea. Sometimes uses a CPAP, he finds that it is uncomfortable to wear. He has been waiting to talk to sleep medicine to see if he can get a new mask as he has new insurance at the beginning of the year.     Today's Vitals: There were no vitals taken for this visit. Telemedicine visit. No vitals.     ASSESSMENT:                             Medication Adherence: good, no issues identified    Obesity: Needs improvement. Would benefit from retrial Contrave as unclear of relation of teeth grinding and Contrave and patient willing to retrial. Future: could consider topiramate, did discuss risk of kidney stone formation due to history and necessity of adequate hydration if this is the path in the future.     Hypertension: Unimproved. Would benefit from BP repeat, would be completed at next clinic appointments. Goal BP <140/90 mmHg, patient's last BP was above goal but at homes are normal. To re-eval at next clinic appointment.     Hyperlipidemia/Primary Prevention: Unimproved. Is due for lipid re-eval. Reasonable to continue aspirin use due to family history and no side effects at this time.     Insomnia: Unimproved. Patient would benefit from follow up with sleep medicine to find a more suitable mask. May end up requiring less of trazodone in the future if able to find more comfortable mask.     PLAN:                            1. Check in with your sleep medicine provider to see if there is an alternative mask that is more comfortable so you can effectively utilize your CPAP    2. Can schedule follow up with primary care provider - looks like you are due for labs: lipids, CMP, etc.     3. Will send in prescription for Contrave 8-90 mg tablet titration today - let clinic know if you have coverage issues or questions or concerns.     Week 1: 1 tablet daily in AM   Week 2: 1 tablet BID (AM and early PM)   Week 3: 2 tablets in AM and 1 tablet in early PM  Week 4 & on: 2 tablets BID (am and early PM)     4. Follow up with Shaila Wooten CNP 4-6 weeks after starting Contrave. Can follow up with [facility] pharmacist 6 weeks after this.     I spent 25 minutes with this patient today. All changes were made with verbal approval with Shaila Wooten CNP. A copy of the visit note was provided to the patient's referring provider.    Will follow up in 3 months with  TARA.    The patient was sent via [employer] a summary of these recommendations as an after visit summary.     Lauren Bloch, PharmD  Medication Therapy Management Pharmacist   [facility]   Phone: [phone]

## 2020-01-02 NOTE — TELEPHONE ENCOUNTER
Prior Authorization Retail Medication Request    Medication/Dose: Contrave  ICD code (if different than what is on RX):  Class 1 obesity due to excess calories with serious comorbidity and body mass index (BMI) of 34.0 to 34.9 in adult [E66.09, Z68.34]  - Primary   Previously Tried and Failed:  Saxenda, history of diet and exercise  Rationale:  Was prescribed Saxenda, but difficultly tolerating due to nausea/abdominal pain so stopped. He did give try in changing time of day and going back to lower dose of Saxenda but still didn't tolerate, so stopped. Previously tried Contrave. He founds that Contrave worked very well, lost 25 lb. He does have history of singular incident of kidney stone 25 years ago so Topiramate would not be a great option.     Insurance Name:    Insurance ID:        Pharmacy Information (if different than what is on RX)  [id number] [address]  Phone:  [phone]

## 2020-01-02 NOTE — Clinical Note
SHANE, started contrave as we discussed. Told him to f/u with you in 4-6 weeks from starting Contrave. Albertina PACHECO

## 2020-01-03 NOTE — TELEPHONE ENCOUNTER
Central Prior Authorization Team   Phone: [phone]      PA Initiation    Medication: Contrave-PA initiated  Insurance Company: [employer] - Phone [phone] Fax [phone]  [id number] [place], [place] - [address] [place] [address]  Filling Pharmacy Phone: [phone]  Filling Pharmacy Fax:    Start Date: 1/3/2020

## 2020-01-06 NOTE — TELEPHONE ENCOUNTER
Prior Authorization Not Needed per Insurance-Pharmacy is attempting to bill an insurance plan that termed. I spoke to Carlos and he ran the claim through [employer] and has a paid claim.     Medication: Contrave-PA not needed  Insurance Company: [employer] - Phone [phone] Fax [phone]  Expected CoPay:      [id number] [place], [place] - [address] [place] [address]  Pharmacy Notified: Yes  Patient Notified: No-Pharmacy will contact

## 2020-01-09 ENCOUNTER — TELEPHONE (OUTPATIENT)
Dept: OTOLARYNGOLOGY | Facility: CLINIC | Age: 60
End: 2020-01-09

## 2020-01-09 NOTE — TELEPHONE ENCOUNTER
[facility] Call Center    Phone Message    May a detailed message be left on voicemail: yes    Reason for Call: Other: New patient // Inspire Device consult // self referral // sleep study at [facility] in 2019 // patient will have sleep study results sent via [employer]      Action Taken: Message routed to:  [facility] ([facility] ENT

## 2020-01-10 NOTE — TELEPHONE ENCOUNTER
FUTURE VISIT INFORMATION      FUTURE VISIT INFORMATION:    Date: 2/4/20    Time: 3 PM    Location: [facility]  REFERRAL INFORMATION:    Referring provider:  Self-Referred (chong Anand)    Referring providers clinic:  NA    Reason for visit/diagnosis: Discuss Inspire    RECORDS REQUESTED FROM:       Clinic name Comments Records Status Imaging Status   [facility] 11/25/19 - OV with Dr. Kerlinske Epic [facility] 4/24/19 - OV with MARI Ching  3/22/19 - OV with Dr. Dylan Engle Care Everywhere    [facility] - Procedures 4/16/19 - Sleep Study 1/21 Sent to scan                        * 1/10/20 10:24 AM Faxed req to PN for sleep study and graphs to be faxed to us - Marta  * 1/21/20 7:50 AM Faxed 2nd Urgent req to PN for sleep study to be sent over - Marta  * 1/21/20 10:36 AM Received Sleep study from [facility] and sent to HIM to be scanned into the chart - Marta

## 2020-02-04 ENCOUNTER — PRE VISIT (OUTPATIENT)
Dept: OTOLARYNGOLOGY | Facility: CLINIC | Age: 60
End: 2020-02-04

## 2020-02-04 ENCOUNTER — OFFICE VISIT (OUTPATIENT)
Dept: OTOLARYNGOLOGY | Facility: CLINIC | Age: 60
End: 2020-02-04
Payer: COMMERCIAL

## 2020-02-04 VITALS — HEIGHT: 72 IN | WEIGHT: 240 LBS | BODY MASS INDEX: 32.51 KG/M2

## 2020-02-04 DIAGNOSIS — G47.33 OSA (OBSTRUCTIVE SLEEP APNEA): Primary | ICD-10-CM

## 2020-02-04 ASSESSMENT — PAIN SCALES - GENERAL: PAINLEVEL: NO PAIN (0)

## 2020-02-04 ASSESSMENT — MIFFLIN-ST. JEOR: SCORE: 1946.63

## 2020-02-04 NOTE — NURSING NOTE
Teaching Flowsheet - ENT   Relevant Diagnosis: Sleep Apnea  Teaching Topic: Pre-surgical teaching for drug induced sleep endoscopy  Person(s) involved in teaching: patient  Motivation Level:   Asks Questions: Yes  Eager to Learn: Yes  Cooperative: Yes  Receptive (willing/able to accept information): Yes  Comments: Reviewed   Pre-op H&P requirements   NPO guidelines prior to surgery  Pre-op scrub directions (given Hibiclens)   Reviewed post-op cares  Activity and pain.  Patient demonstrates understanding of the following:  Reason for the appointment, diagnosis and treatment plan: Yes  Knowledge of proper use of medications and conditions for which they are ordered (with special attention to potential side effects or drug interactions): Yes  Which situations necessitate calling provider: Yes   Whom to contact: Yes  Nutritional needs and diet plan: Yes  Pain management techniques: Yes  Patient instructed on hand hygiene: Yes  How and/when to access community resources: Yes     Infection Prevention:  Signs and symptoms of infection taught: Yes  Instructional Materials Used/Given: Pre-op booklet, verbal Instruction covering the contents provided.     Patient expressed understanding. Patient will call if he wishes to proceed. Patient denies any further questions or concerns at this time.     Zuri Burgos RN

## 2020-02-04 NOTE — PROGRESS NOTES
SLEEP SURGERY CONSULTATION    Patient: Speedy Carlson  : 1960  CHIEF COMPLAINT: HANNAH    IDENTIFICATION: Patient consulted Dr. Marroquin for surgical evaluation and possible treatment of obstructive sleep apnea syndrome for Speedy Carlson.    HPI:  Speedy Carlson is a 59 year old year old male who has Obstructive Sleep Apnea.  Patient had a home sleep study performed through [facility] on 2018.  Overall RDI was 51.8.  Lowest oxygen saturation was 72%.  He had 39 obstructive apneas, 1 mixed apnea, 0 central apneas, 382 hypopneas.  He had supine AHI of 87.8.  Nonsupine was ranging anywhere between 37-68.  Patient reports that he had a sleep study done due to his wife's complaint of loud snoring that is disruptive to her sleep.  Overall the patient has very little complaint about his overall sleep quality.  He denies any excessive daytime sleepiness.  Retry CPAP for several months.  He tried nasal pillows but found that he just could not keep it on for a significant amount of time.   the pressure felt very uncomfortable.  He also likes to sleep on his stomach which then would dislodge the mask.  He reports that he is here today to learn a little bit more about hypoglossal nerve stimulation therapy as well as the criteria and what is involved.  He is concerned about the risk of untreated sleep apnea with heart disease.  He does have a significant family history of heart disease.    PAST MEDICAL HISTORY:  Past Medical History:   Diagnosis Date     Acute prostatitis      Calculus of kidney     no recurrence     Mild intermittent asthma     hx in childhood     Mixed hyperlipidemia      Other acne      Varicella without mention of complication        PAST SURGICAL HISTORY:  Past Surgical History:   Procedure Laterality Date     HC REMOVAL OF TONSILS,<13 Y/O      s/p Tonsillectomy     HC TOOTH EXTRACTION W/FORCEP      wisdom teeth     PHOTOREFRACTIVE KERATECTOMY      s/p Lasik surgery        MEDICATIONS:  Current Outpatient Medications   Medication Sig Dispense Refill     aspirin 81 MG tablet Take 81 mg by mouth daily       fenofibrate 145 MG tablet Take  by mouth daily.       losartan (COZAAR) 50 MG tablet Take 50 mg by mouth daily        naltrexone-bupropion (CONTRAVE) 8-90 MG per 12 hr tablet Week 1: 1 tablet daily in AM; Week 2: 1 tablet in AM & PM, Week 3: 2 tablets in AM & 1 tablet in PM, Week 4 & on: 2 tablets in AM & PM. 120 tablet 2     pravastatin (PRAVACHOL) 40 MG tablet Take 40 mg by mouth daily        traZODone (DESYREL) 50 MG tablet Take 2 tablets by mouth At Bedtime       zolpidem (AMBIEN) 5 MG tablet Take 5 mg by mouth nightly as needed   1       ALLERGIES:  Allergies   Allergen Reactions     No Known Allergies      Azithromycin Rash     Skin peeling       SOCIAL HISTORY:  Social History     Socioeconomic History     Marital status:      Spouse name: Acosta Zuleta     Number of children: 0     Years of education: 18     Highest education level: Not on file   Occupational History     Occupation: Research Analyst     Employer: [employer]   Social Needs     Financial resource strain: Not on file     Food insecurity:     Worry: Not on file     Inability: Not on file     Transportation needs:     Medical: Not on file     Non-medical: Not on file   Tobacco Use     Smoking status: Never Smoker     Smokeless tobacco: Never Used     Tobacco comment: cigar occ   Substance and Sexual Activity     Alcohol use: Yes     Comment: 2-4 beers per week     Drug use: No     Sexual activity: Yes     Partners: Female     Birth control/protection: Pill   Lifestyle     Physical activity:     Days per week: Not on file     Minutes per session: Not on file     Stress: Not on file   Relationships     Social connections:     Talks on phone: Not on file     Gets together: Not on file     Attends Pentecostal service: Not on file     Active member of club or organization: Not on file     Attends  meetings of clubs or organizations: Not on file     Relationship status: Not on file     Intimate partner violence:     Fear of current or ex partner: Not on file     Emotionally abused: Not on file     Physically abused: Not on file     Forced sexual activity: Not on file   Other Topics Concern     Not on file   Social History Narrative    Social Documentation:        Balanced Diet: YES    Calcium intake: 1-2 per day    Caffeine: 1 cups coffee per day-during week    Exercise:  type of activity walk;  3 days / week  exercycle, treadmill    Sunscreen: Yes    Seatbelts:  Yes    Self Testicular Exam: Yes    Physical/Emotional/Sexual Abuse: No     Do you feel safe in your environment? Yes        Cholesterol screen up to date: No-Discussed    Eye Exam up to date: Yes    Dental Exam up to date: Yes    Dexa Scan up to date: Does Not Apply    Colonoscopy up to date: Does Not Apply    Immunizations up to date: Yes     Glucose screen if over 40:  No- Discussed        Brittany Canada MA       FAMILY HISTORY:  Family History   Problem Relation Age of Onset     C.A.D. Brother         MI at age 40     Breast Cancer Mother         at age  45     Depression Mother      Lipids Mother      Cancer Father         lung     Lipids Brother      Obesity Mother      Eye Disorder Mother        REVIEW OF SYSTEMS:   ENT ROS 2/4/2020   Constitutional Problems with sleep         PHYSICAL EXAM  Ht 1.829 m (6')   Wt 108.9 kg (240 lb)   BMI 32.55 kg/m          ASSESSMENT:  1.  Severe obstructive sleep apnea,  untreated   Incompletely treated sleep apnea elevates risk of death, cardiovascular disease, and motor vehicle crashes, and it creates deficits in daytime function and quality of life.  Surgical treatment can improve these clinically important outcomes; therefore surgical treatment is medically necessary if the patient is not using CPAP adequately.       PLAN:  1.  We discussed obstructive sleep apnea, including pathophysiology and  consequences.  We provided the patient with written information about obstructive sleep apnea and its management.  We discussed the beneficial relationship between weight loss and sleep apnea.      2.  I discussed with the patient held upper airway stimulation therapy works. We reviewed expected outcomes as well as MRI incompatibility restrictions at this time.  We discussed what is involved in the surgery as well as as expected recovery.  Discussed with the patient the risk of nonresponse rate as well as potential discomfort from the device itself while stimulating the tongue. We then reviewed the selection criteria.    I also discussed with patient that if he wanted to speak to someone who has the device I could arrange with a copy to have him speak to ambassador.    He is going to talk to his wife about how he would like to proceed.  If he does want to continue with hypoglossal nerve stimulation therapy then the next up would be to set him up for a drug-induced sleep endoscopy.    I spent 35 minutes face-to-face with Speedy Carlson during today's office visit, of which more than 50% was spent on counseling and coordination of care, which included discussion of pathophysiology of patient's obstructive sleep apnea, treatment options, risks and benefits of each option.

## 2020-02-04 NOTE — PATIENT INSTRUCTIONS
1.  You were seen in the ENT Clinic today by Dr. Marroquin.  If you have any questions or concerns after your appointment, please call [phone].  2.  Plan is to move forward with a drug induced sleep endoscopy. This is an outpatient procedure that is done in the operating room.  You will need a  the day of surgery  3. You will need to consult with your primary care MD for a pre op physical.  Forms for this were sent home with you today.  4. Shaila our surgery scheduler will call you with a date and time for the procedure. [phone]  5.  Please return to the clinic one week after your procedure to review the results with Dr. Marroquin and to develop the plan of care.      Please call our clinic for any questions, concerns, and/or worsening symptoms.      Clinic #[phone]       Option 1 for scheduling.    Thank you for allowing us to be apart of your care!    Zuri MCLAUGHLIN RNCC    If you need to reach me my direct line is: [phone]    I am out of the office on Thursday's.

## 2020-02-04 NOTE — LETTER
2020       RE: Speedy Carlson  [address] [facility] [place] [address]     Dear Colleague,    Thank you for referring your patient, Speedy Carlson, to the [facility] at [facility]. Please see a copy of my visit note below.        SLEEP SURGERY CONSULTATION    Patient: Speedy Carlson  : 1960  CHIEF COMPLAINT: HANNAH    IDENTIFICATION: Patient consulted Dr. Marroquin for surgical evaluation and possible treatment of obstructive sleep apnea syndrome for Speedy Carlson.    HPI:  Spedey Carlson is a 59 year old year old male who has Obstructive Sleep Apnea.  Patient had a home sleep study performed through [facility] on 2018.  Overall RDI was 51.8.  Lowest oxygen saturation was 72%.  He had 39 obstructive apneas, 1 mixed apnea, 0 central apneas, 382 hypopneas.  He had supine AHI of 87.8.  Nonsupine was ranging anywhere between 37-68.  Patient reports that he had a sleep study done due to his wife's complaint of loud snoring that is disruptive to her sleep.  Overall the patient has very little complaint about his overall sleep quality.  He denies any excessive daytime sleepiness.  Retry CPAP for several months.  He tried nasal pillows but found that he just could not keep it on for a significant amount of time.   the pressure felt very uncomfortable.  He also likes to sleep on his stomach which then would dislodge the mask.  He reports that he is here today to learn a little bit more about hypoglossal nerve stimulation therapy as well as the criteria and what is involved.  He is concerned about the risk of untreated sleep apnea with heart disease.  He does have a significant family history of heart disease.    PAST MEDICAL HISTORY:  Past Medical History:   Diagnosis Date     Acute prostatitis      Calculus of kidney     no recurrence     Mild intermittent asthma     hx in childhood     Mixed hyperlipidemia      Other acne       Varicella without mention of complication        PAST SURGICAL HISTORY:  Past Surgical History:   Procedure Laterality Date     HC REMOVAL OF TONSILS,<13 Y/O      s/p Tonsillectomy     HC TOOTH EXTRACTION W/FORCEP      wisdom teeth     PHOTOREFRACTIVE KERATECTOMY      s/p Lasik surgery       MEDICATIONS:  Current Outpatient Medications   Medication Sig Dispense Refill     aspirin 81 MG tablet Take 81 mg by mouth daily       fenofibrate 145 MG tablet Take  by mouth daily.       losartan (COZAAR) 50 MG tablet Take 50 mg by mouth daily        naltrexone-bupropion (CONTRAVE) 8-90 MG per 12 hr tablet Week 1: 1 tablet daily in AM; Week 2: 1 tablet in AM & PM, Week 3: 2 tablets in AM & 1 tablet in PM, Week 4 & on: 2 tablets in AM & PM. 120 tablet 2     pravastatin (PRAVACHOL) 40 MG tablet Take 40 mg by mouth daily        traZODone (DESYREL) 50 MG tablet Take 2 tablets by mouth At Bedtime       zolpidem (AMBIEN) 5 MG tablet Take 5 mg by mouth nightly as needed   1       ALLERGIES:  Allergies   Allergen Reactions     No Known Allergies      Azithromycin Rash     Skin peeling       SOCIAL HISTORY:  Social History     Socioeconomic History     Marital status:      Spouse name: Acosta uZleta     Number of children: 0     Years of education: 18     Highest education level: Not on file   Occupational History     Occupation: Research Analyst     Employer: [employer]   Social Needs     Financial resource strain: Not on file     Food insecurity:     Worry: Not on file     Inability: Not on file     Transportation needs:     Medical: Not on file     Non-medical: Not on file   Tobacco Use     Smoking status: Never Smoker     Smokeless tobacco: Never Used     Tobacco comment: cigar occ   Substance and Sexual Activity     Alcohol use: Yes     Comment: 2-4 beers per week     Drug use: No     Sexual activity: Yes     Partners: Female     Birth control/protection: Pill   Lifestyle     Physical activity:     Days per week:  Not on file     Minutes per session: Not on file     Stress: Not on file   Relationships     Social connections:     Talks on phone: Not on file     Gets together: Not on file     Attends Tenriism service: Not on file     Active member of club or organization: Not on file     Attends meetings of clubs or organizations: Not on file     Relationship status: Not on file     Intimate partner violence:     Fear of current or ex partner: Not on file     Emotionally abused: Not on file     Physically abused: Not on file     Forced sexual activity: Not on file   Other Topics Concern     Not on file   Social History Narrative    Social Documentation:        Balanced Diet: YES    Calcium intake: 1-2 per day    Caffeine: 1 cups coffee per day-during week    Exercise:  type of activity walk;  3 days / week  exercycle, treadmill    Sunscreen: Yes    Seatbelts:  Yes    Self Testicular Exam: Yes    Physical/Emotional/Sexual Abuse: No     Do you feel safe in your environment? Yes        Cholesterol screen up to date: No-Discussed    Eye Exam up to date: Yes    Dental Exam up to date: Yes    Dexa Scan up to date: Does Not Apply    Colonoscopy up to date: Does Not Apply    Immunizations up to date: Yes     Glucose screen if over 40:  No- Discussed        Brittany Canada MA       FAMILY HISTORY:  Family History   Problem Relation Age of Onset     C.A.D. Brother         MI at age 40     Breast Cancer Mother         at age  45     Depression Mother      Lipids Mother      Cancer Father         lung     Lipids Brother      Obesity Mother      Eye Disorder Mother        REVIEW OF SYSTEMS:  [facility] ROS 2/4/2020   Constitutional Problems with sleep         PHYSICAL EXAM  Ht 1.829 m (6')   Wt 108.9 kg (240 lb)   BMI 32.55 kg/m           ASSESSMENT:  1.  Severe obstructive sleep apnea,  untreated   Incompletely treated sleep apnea elevates risk of death, cardiovascular disease, and motor vehicle crashes, and it creates deficits in daytime  function and quality of life.  Surgical treatment can improve these clinically important outcomes; therefore surgical treatment is medically necessary if the patient is not using CPAP adequately.       PLAN:  1.  We discussed obstructive sleep apnea, including pathophysiology and consequences.  We provided the patient with written information about obstructive sleep apnea and its management.  We discussed the beneficial relationship between weight loss and sleep apnea.      2.  I discussed with the patient held upper airway stimulation therapy works. We reviewed expected outcomes as well as MRI incompatibility restrictions at this time.  We discussed what is involved in the surgery as well as as expected recovery.  Discussed with the patient the risk of nonresponse rate as well as potential discomfort from the device itself while stimulating the tongue. We then reviewed the selection criteria.    I also discussed with patient that if he wanted to speak to someone who has the device I could arrange with a copy to have him speak to ambassador.    He is going to talk to his wife about how he would like to proceed.  If he does want to continue with hypoglossal nerve stimulation therapy then the next up would be to set him up for a drug-induced sleep endoscopy.    I spent 35 minutes face-to-face with Speedy Carlson during today's office visit, of which more than 50% was spent on counseling and coordination of care, which included discussion of pathophysiology of patient's obstructive sleep apnea, treatment options, risks and benefits of each option.        Again, thank you for allowing me to participate in the care of your patient.      Sincerely,    Gladys Marroquin MD

## 2020-02-05 ENCOUNTER — VIRTUAL VISIT (OUTPATIENT)
Dept: FAMILY MEDICINE | Facility: OTHER | Age: 60
End: 2020-02-05

## 2020-02-05 ENCOUNTER — MYC MEDICAL ADVICE (OUTPATIENT)
Dept: SURGERY | Facility: CLINIC | Age: 60
End: 2020-02-05

## 2020-02-05 DIAGNOSIS — Z82.49 FAMILY HISTORY OF PREMATURE CAD: Primary | ICD-10-CM

## 2020-02-06 NOTE — PROGRESS NOTES
"Date: 2020 18:19:36  Clinician: Addison Briseno  Clinician NPI: [id number]  Patient: Speedy Carlson  Patient : 1960  Patient Address: [address] [place] [address]  Patient Phone: [phone]  Visit Protocol: URI  Patient Summary:  Speedy is a 59 year old ( : 1960 ) male who initiated a Visit for cold, sinus infection, or influenza. When asked the question \"Please sign me up to receive news, health information and promotions from [employer].\", Speedy responded \"No\".    Speedy states his symptoms started today.   His symptoms consist of a cough, myalgia, a headache, a sore throat, chills, and malaise. Speedy also feels feverish.   Symptom details     Cough: Speedy coughs every 5-10 minutes and his cough is not more bothersome at night. Phlegm does not come into his throat when he coughs.     Sore throat: Speedy reports having mild throat pain (1-3 on a 10 point pain scale), does not have exudate on his tonsils, and can swallow liquids. He is not sure if the lymph nodes in his neck are enlarged. A rash has not appeared on the skin since the sore throat started.     Temperature: His current temperature is 100.1 degrees Fahrenheit. Speedy has had a temperature over 100 degrees Fahrenheit for 1-2 days.     Headache: He states the headache is moderate (4-6 on a 10 point pain scale).      Speedy denies having facial pain or pressure, ear pain, nasal congestion, rhinitis, wheezing, and teeth pain. He also denies having recent facial or sinus surgery in the past 60 days, having a sinus infection within the past year, and taking antibiotic medication for the symptoms. He is not experiencing dyspnea.   Precipitating events  Within the past week, Speedy has not been exposed to someone with strep throat. He has recently been exposed to someone with influenza. Speedy has not been in close contact with any high risk individuals.   Speedy has not traveled internationally in the last 14 " days before the start of his symptoms.   Pertinent medical history  Weight: 240 lbs   Speedy does not smoke or use smokeless tobacco.   Weight: 240 lbs    MEDICATIONS: trazodone oral, losartan oral, Contrave oral, pravastatin oral, ALLERGIES: azithromycin  Clinician Response:  Dear Speedy,  Based on the information provided, you have influenza (the flu). The flu is a very contagious infection that can lead to a serious illness in anyone, but some are at risk for becoming more sick than others. For this reason, it is important to know you have the flu so you can take steps to prevent spreading it to others.  Medication information  I am prescribing:     Oseltamivir (Tamiflu) 75 mg oral capsule. Take 1 capsule by mouth 2 times per day for 5 days. There are no refills with this prescription.   Unless you are allergic to the over-the-counter medication(s) below, I recommend using:       Acetaminophen (Tylenol or store brand) oral tablet. Take 1-2 tablets by mouth every 4-6 hours to help with the discomfort.      Ibuprofen (Advil or store brand) 200 mg oral tablet. Take 1-3 tablets (200-600 mg) by mouth every 8 hours to help with the discomfort. Make sure to take the ibuprofen with food. Do not exceed 2400 mg in 24 hours.    A decongestant such as Sudafed PE or store brand.      Dextromethorphan (Robitussin DM or store brand). This medication is an expectorant that works by thinning the mucus in your air passages to make it easier to cough up the mucus and clear your airways. Please follow the instructions on the package.     Over-the-counter medications do not require a prescription. Ask the pharmacist if you have any questions.  Self care  The following tips will keep you as comfortable as possible while you recover:     Rest    Drink plenty of water and other liquids    Take a hot shower to loosen congestion    Use throat lozenges    Gargle with warm salt water (1/4 teaspoon of salt per 8 ounce glass of water)     Suck on frozen items such as popsicles or ice cubes    Drink hot tea with lemon and honey    Take a spoonful of honey to reduce your cough     If you have a fever, stay home until your temperature has returned to normal for 24 hours and you feel well enough for daily activities. And of course, wash your hands often to prevent spreading the flu and other illnesses. However, the best way to prevent the flu is to get a flu shot before each flu season.  When to seek care  Please be seen in a clinic or urgent care if new symptoms develop, or symptoms become worse.  Call 911 or go to the emergency room if you feel that your throat is closing off, you suddenly develop a rash, you are unable to swallow fluids, you are drooling, or you are having difficulty breathing.   Diagnosis: Influenza  Diagnosis ICD: J11.1  Prescription: oseltamivir (Tamiflu) 75 mg oral capsule 10 capsule, 5 days supply. Take 1 capsule by mouth 2 times per day for 5 days. Refills: 0, Refill as needed: no, Allow substitutions: yes  Pharmacy: [facility] #[id number] - [phone] - [address] [place] [address]

## 2020-02-12 ENCOUNTER — MYC MEDICAL ADVICE (OUTPATIENT)
Dept: OTOLARYNGOLOGY | Facility: CLINIC | Age: 60
End: 2020-02-12

## 2020-02-18 NOTE — TELEPHONE ENCOUNTER
RECORDS RECEIVED FROM:Internal, external   DATE RECEIVED: 3.4.2020   NOTES STATUS DETAILS   OFFICE NOTE from referring provider    Internal 2.5.2020 [facility]   OFFICE NOTE from other cardiologist    Care Everywhere 5.17.16 [place] [facility]   DISCHARGE SUMMARY from hospital    N/A    DISCHARGE REPORT from the ER   N/A    OPERATIVE REPORT    N/A    MEDICATION LIST   Internal    LABS     BMP   Internal 2.3.2020   CBC   Internal 8.12.14   CMP   N/A    Lipids   Internal 1.12.18   TSH   Internal 8.12.14   DIAGNOSTIC PROCEDURES     EKG   In process 4.22.10    11.21.10 [place]   Monitor Reports   N/A    IMAGING (DISC & REPORT)      Echo   N/A    Stress Tests   N/A    Cath   N/A    MRI/MRA   N/A    CT/CTA   In process 5.18.16 CT cardiac calcium , [place]     Action 2.18.2020 5:57 AM   Action Taken Requested CT 5.18.16 and EKG strips from 11.21.10 from [place]

## 2020-02-25 ENCOUNTER — MYC MEDICAL ADVICE (OUTPATIENT)
Dept: OTOLARYNGOLOGY | Facility: CLINIC | Age: 60
End: 2020-02-25

## 2020-02-25 DIAGNOSIS — G47.33 OSA (OBSTRUCTIVE SLEEP APNEA): Primary | ICD-10-CM

## 2020-02-25 RX ORDER — GLYCOPYRROLATE 0.2 MG/ML
0.2 INJECTION, SOLUTION INTRAMUSCULAR; INTRAVENOUS ONCE
Status: CANCELLED | OUTPATIENT
Start: 2020-02-25 | End: 2020-02-25

## 2020-02-25 RX ORDER — OXYMETAZOLINE HYDROCHLORIDE 0.05 G/100ML
2 SPRAY NASAL ONCE
Status: CANCELLED | OUTPATIENT
Start: 2020-02-25 | End: 2020-02-25

## 2020-02-26 ENCOUNTER — HOSPITAL ENCOUNTER (OUTPATIENT)
Facility: AMBULATORY SURGERY CENTER | Age: 60
End: 2020-02-26
Attending: OTOLARYNGOLOGY
Payer: COMMERCIAL

## 2020-02-26 DIAGNOSIS — G47.33 OSA (OBSTRUCTIVE SLEEP APNEA): ICD-10-CM

## 2020-02-27 NOTE — TELEPHONE ENCOUNTER
FUTURE VISIT INFORMATION      SURGERY INFORMATION:    Date: 3/18/20    Location: [facility] OR    Surgeon:  Gladys Marroquin MD    Anesthesia Type:  MAC    Procedure: DRUG-INDUCED SLEEP ENDOSCOPY    Consult: OV 20- [facility]a    RECORDS REQUESTED FROM:       Primary Care Provider: Steve Mcmahon MD- [place]    Pertinent Medical History: HANNAH, hypertension    Most recent EKG+ Tracin/22/10    Most recent Sleep Study: 19

## 2020-03-04 ENCOUNTER — PRE VISIT (OUTPATIENT)
Dept: CARDIOLOGY | Facility: CLINIC | Age: 60
End: 2020-03-04

## 2020-03-09 PROBLEM — G47.00 INSOMNIA: Status: ACTIVE | Noted: 2019-01-24

## 2020-03-09 PROBLEM — E55.9 HYPOVITAMINOSIS D: Status: ACTIVE | Noted: 2019-01-24

## 2020-03-09 PROBLEM — H93.19 TINNITUS: Status: ACTIVE | Noted: 2019-01-24

## 2020-03-11 ENCOUNTER — OFFICE VISIT (OUTPATIENT)
Dept: ENDOCRINOLOGY | Facility: CLINIC | Age: 60
End: 2020-03-11
Payer: COMMERCIAL

## 2020-03-11 VITALS
SYSTOLIC BLOOD PRESSURE: 114 MMHG | DIASTOLIC BLOOD PRESSURE: 85 MMHG | TEMPERATURE: 98.3 F | HEART RATE: 83 BPM | HEIGHT: 72 IN | BODY MASS INDEX: 31.59 KG/M2 | WEIGHT: 233.2 LBS | OXYGEN SATURATION: 98 %

## 2020-03-11 DIAGNOSIS — I10 BENIGN ESSENTIAL HYPERTENSION: ICD-10-CM

## 2020-03-11 DIAGNOSIS — E66.09 CLASS 1 OBESITY DUE TO EXCESS CALORIES WITH SERIOUS COMORBIDITY AND BODY MASS INDEX (BMI) OF 34.0 TO 34.9 IN ADULT: ICD-10-CM

## 2020-03-11 DIAGNOSIS — E66.811 CLASS 1 OBESITY DUE TO EXCESS CALORIES WITH SERIOUS COMORBIDITY AND BODY MASS INDEX (BMI) OF 34.0 TO 34.9 IN ADULT: ICD-10-CM

## 2020-03-11 DIAGNOSIS — E78.5 HYPERLIPIDEMIA LDL GOAL <130: ICD-10-CM

## 2020-03-11 RX ORDER — NALTREXONE HYDROCHLORIDE AND BUPROPION HYDROCHLORIDE 8; 90 MG/1; MG/1
TABLET, EXTENDED RELEASE ORAL
Qty: 120 TABLET | Refills: 3 | Status: SHIPPED | OUTPATIENT
Start: 2020-03-11 | End: 2020-09-02

## 2020-03-11 ASSESSMENT — PAIN SCALES - GENERAL: PAINLEVEL: NO PAIN (0)

## 2020-03-11 ASSESSMENT — MIFFLIN-ST. JEOR: SCORE: 1915.79

## 2020-03-11 NOTE — LETTER
3/11/2020       RE: Speedy Carlson  [address] [facility] [address]     Dear Colleague,    Thank you for referring your patient, Speedy Carlson, to the [facility] MEDICAL WEIGHT MANAGEMENT at [facility]. Please see a copy of my visit note below.    Return Medical Weight Management Note     Speedy Carlson  MRN:  [id number]  :  1960  CAMILO:  3/11/2020    Dear Primary Care Provider,    I had the pleasure of seeing your patient Speedy Carlson. He is a 59 year old male who I am continuing to see for treatment of obesity related to:       2019   I have the following health issues associated with obesity: Heart Disease, High Blood Pressure, High Cholesterol, Sleep Apnea   I have the following symptoms associated with obesity: None of the above     ASSESSMENT:   MW follow up.  Doing well and lost 19 lbs and tolerating Contrave.     PLAN:   Refill Contrave    INTERVAL HISTORY:  New [facility] 19 Shaila Wooten  Didn't tolerate Saxenda 0.6mg due to stomach cramping so discontinued.    Lauren Bloch 20 and retrial Contrave  No side effects from Contrave  Has lost 19 lbs in total  He wants to get to under 200 lbs.   HANNAH: doesn't tolerate CPAP, plans to do possible Inspire device      CURRENT WEIGHT:   233 lbs 3.2 oz    Initial Weight (lbs): 252.3 lbs  Last Visits Weight: 114.4 kg (252 lb 4.8 oz)  Cumulative weight loss (lbs): 19.1  Weight Loss Percentage: 7.57%  Waist Circumference (cm): 117 cm    MEDICATIONS:   Current Outpatient Medications   Medication Sig Dispense Refill     aspirin 81 MG tablet Take 81 mg by mouth daily       fenofibrate 145 MG tablet Take  by mouth daily.       losartan (COZAAR) 50 MG tablet Take 50 mg by mouth daily        naltrexone-bupropion (CONTRAVE) 8-90 MG per 12 hr tablet Week 1: 1 tablet daily in AM; Week 2: 1 tablet in AM & PM, Week 3: 2 tablets in AM & 1 tablet in PM, Week 4 & on: 2 tablets in AM & PM. 120 tablet 2      pravastatin (PRAVACHOL) 40 MG tablet Take 40 mg by mouth daily        traZODone (DESYREL) 50 MG tablet Take 2 tablets by mouth At Bedtime       zolpidem (AMBIEN) 5 MG tablet Take 5 mg by mouth nightly as needed   1       Weight Loss Medication History Reviewed With Patient 3/6/2020   Which weight loss medications are you currently taking on a regular basis?  Contrave (naltrexone/bupropion)   Are you having any side effects from the weight loss medication that we have prescribed you? No        VITALS:  /85 (BP Location: Left arm, Patient Position: Sitting, Cuff Size: Adult Large)   Pulse 83   Temp 98.3  F (36.8  C) (Oral)   Ht 1.829 m (6')   Wt 105.8 kg (233 lb 3.2 oz)   SpO2 98%   BMI 31.63 kg/m      FOLLOW-UP:    12 weeks with Shaila Hernandez    Time: 15 min spent on evaluation, management, counseling, education, & motivational interviewing with greater than 50 % of the total time was spent on counseling and coordinating care    Sincerely,    Mary Childers PA-C

## 2020-03-11 NOTE — PATIENT INSTRUCTIONS
Refill Contrave    Follow up in 3 months      For any questions or concerns call Luz Elena Garcia LPN at [phone]

## 2020-03-11 NOTE — NURSING NOTE
(   Chief Complaint   Patient presents with     RECHECK     Weight management follow up.    )    ( Weight: 105.8 kg (233 lb 3.2 oz) )  ( Height: 182.9 cm (6') )  ( BMI (Calculated): 31.63 )  (   )  ( Cumulative weight loss (lbs): 19.1 )  ( Last Visits Weight: 114.4 kg (252 lb 4.8 oz) )  ( Wt change since last visit (lbs): -19.1 )  ( Waist Circumference (cm): 117 cm )  (   )    ( BP: 114/85 )  (   )  ( Temp: 98.3  F (36.8  C) )  ( Temp src: Oral )  ( Pulse: 83 )  (   )  ( SpO2: 98 % )    (   Patient Active Problem List   Diagnosis     Other acne     Mixed hyperlipidemia     Prostatitis     Tear film insufficiency     HYPERLIPIDEMIA LDL GOAL <130     HANNAH (obstructive sleep apnea)     Advance directive on file     Cervical radiculopathy     Chronic idiopathic urticaria     ED (erectile dysfunction)     Family history of early CAD     Hypertension     Hypovitaminosis D     Insomnia     Seborrhea capitis     Tinnitus    )  (   Current Outpatient Medications   Medication Sig Dispense Refill     aspirin 81 MG tablet Take 81 mg by mouth daily       fenofibrate 145 MG tablet Take  by mouth daily.       losartan (COZAAR) 50 MG tablet Take 50 mg by mouth daily        naltrexone-bupropion (CONTRAVE) 8-90 MG per 12 hr tablet Week 1: 1 tablet daily in AM; Week 2: 1 tablet in AM & PM, Week 3: 2 tablets in AM & 1 tablet in PM, Week 4 & on: 2 tablets in AM & PM. 120 tablet 2     pravastatin (PRAVACHOL) 40 MG tablet Take 40 mg by mouth daily        traZODone (DESYREL) 50 MG tablet Take 2 tablets by mouth At Bedtime       zolpidem (AMBIEN) 5 MG tablet Take 5 mg by mouth nightly as needed   1    )  ( Diabetes Eval:    )    ( Pain Eval:  No Pain (0) )    ( Wound Eval:       )    (   History   Smoking Status     Never Smoker   Smokeless Tobacco     Never Used     Comment: cigar occ    )    ( Signed By:  Elo Joshi CMA; March 11, 2020; 1:06 PM )

## 2020-03-11 NOTE — PROGRESS NOTES
Return Medical Weight Management Note     Speedy Carlson  MRN:  [id number]  :  1960  CAMILO:  3/11/2020    Dear Primary Care Provider,    I had the pleasure of seeing your patient Speedy Carlson. He is a 59 year old male who I am continuing to see for treatment of obesity related to:       2019   I have the following health issues associated with obesity: Heart Disease, High Blood Pressure, High Cholesterol, Sleep Apnea   I have the following symptoms associated with obesity: None of the above     ASSESSMENT:   MW follow up.  Doing well and lost 19 lbs and tolerating Contrave.     PLAN:   Refill Contrave    INTERVAL HISTORY:  New [facility] 19 Shaila Wooten  Didn't tolerate Saxenda 0.6mg due to stomach cramping so discontinued.    Lauren Bloch 20 and retrial Contrave  No side effects from Contrave  Has lost 19 lbs in total  He wants to get to under 200 lbs.   HANNAH: doesn't tolerate CPAP, plans to do possible Inspire device      CURRENT WEIGHT:   233 lbs 3.2 oz    Initial Weight (lbs): 252.3 lbs  Last Visits Weight: 114.4 kg (252 lb 4.8 oz)  Cumulative weight loss (lbs): 19.1  Weight Loss Percentage: 7.57%  Waist Circumference (cm): 117 cm    MEDICATIONS:   Current Outpatient Medications   Medication Sig Dispense Refill     aspirin 81 MG tablet Take 81 mg by mouth daily       fenofibrate 145 MG tablet Take  by mouth daily.       losartan (COZAAR) 50 MG tablet Take 50 mg by mouth daily        naltrexone-bupropion (CONTRAVE) 8-90 MG per 12 hr tablet Week 1: 1 tablet daily in AM; Week 2: 1 tablet in AM & PM, Week 3: 2 tablets in AM & 1 tablet in PM, Week 4 & on: 2 tablets in AM & PM. 120 tablet 2     pravastatin (PRAVACHOL) 40 MG tablet Take 40 mg by mouth daily        traZODone (DESYREL) 50 MG tablet Take 2 tablets by mouth At Bedtime       zolpidem (AMBIEN) 5 MG tablet Take 5 mg by mouth nightly as needed   1       Weight Loss Medication History Reviewed With Patient 3/6/2020   Which weight loss  medications are you currently taking on a regular basis?  Contrave (naltrexone/bupropion)   Are you having any side effects from the weight loss medication that we have prescribed you? No        VITALS:  /85 (BP Location: Left arm, Patient Position: Sitting, Cuff Size: Adult Large)   Pulse 83   Temp 98.3  F (36.8  C) (Oral)   Ht 1.829 m (6')   Wt 105.8 kg (233 lb 3.2 oz)   SpO2 98%   BMI 31.63 kg/m      FOLLOW-UP:    12 weeks with Shaila Hernandez    Time: 15 min spent on evaluation, management, counseling, education, & motivational interviewing with greater than 50 % of the total time was spent on counseling and coordinating care    Sincerely,    Mary Childers PA-C

## 2020-03-12 ENCOUNTER — ANESTHESIA EVENT (OUTPATIENT)
Dept: SURGERY | Facility: CLINIC | Age: 60
End: 2020-03-12

## 2020-03-12 ENCOUNTER — OFFICE VISIT (OUTPATIENT)
Dept: SURGERY | Facility: CLINIC | Age: 60
End: 2020-03-12
Payer: COMMERCIAL

## 2020-03-12 ENCOUNTER — PRE VISIT (OUTPATIENT)
Dept: SURGERY | Facility: CLINIC | Age: 60
End: 2020-03-12

## 2020-03-12 VITALS
OXYGEN SATURATION: 97 % | TEMPERATURE: 98 F | SYSTOLIC BLOOD PRESSURE: 129 MMHG | HEIGHT: 72 IN | DIASTOLIC BLOOD PRESSURE: 79 MMHG | WEIGHT: 234 LBS | BODY MASS INDEX: 31.69 KG/M2 | HEART RATE: 88 BPM | RESPIRATION RATE: 14 BRPM

## 2020-03-12 DIAGNOSIS — Z01.818 PREOP EXAMINATION: Primary | ICD-10-CM

## 2020-03-12 ASSESSMENT — LIFESTYLE VARIABLES: TOBACCO_USE: 1

## 2020-03-12 ASSESSMENT — MIFFLIN-ST. JEOR: SCORE: 1919.42

## 2020-03-12 NOTE — PATIENT INSTRUCTIONS
Preparing for Your Surgery      Name:  Speedy Carlson   MRN:  [id number]   :  1960   Today's Date:  3/12/2020     Arriving for surgery:  Surgery date:  3/18/20  Arrival time:  8:30 am    Please come to:     [facility]  [address] [place] [id number]     Parking is available in front of the Clinics and Surgery Center building from 5:30AM to 8:00PM.  -  Proceed to the 5th floor to check into the Ambulatory Surgery Center.              >> There will be patient concierges on the 1st and 5th floor, for assistance or an escort, if you would like.              >> Please call [phone] with any questions.    -  Bring your ID and insurance card.    - If you are scheduled to go home the Same Day as surgery you must have a responsible adult as a  and to stay with you overnight the first 24 hours after surgery.     What can I eat or drink?  -  You may have solid food or milk products until 8 hours prior to your surgery. (Until 1:45 am )  -  You may have water, apple juice or 7up/Sprite until 2 hours prior to your surgery. (Until 7:45 am )    Which medicines can I take?       -  Do not bring your own medications to the hospital, except for inhalers and eye drops.        -  Follow Otolaryngology Clinic instructions regarding Ibuprofen. If no instructions given, NO Ibuprofen the day prior to surgery.            -   Hold Multivitamins and supplements one week prior to surgery.                       -  Hold Naproxen (Aleve) 4 days prior to surgery.    -  Please take these medications the morning of surgery:  Aspirin, Losartan, Fenofibrate, Contrave, Pravastatin,  Acetaminophen (Tylenol) if needed    How do I prepare myself?  -  Take two showers: one the night before surgery; and one the morning of surgery.         Use Scrubcare or Hibiclens to wash from neck down.  You may use your own shampoo and conditioner. No other hair products.   -  Do NOT use lotion, powder,  colognes, deodorant, or antiperspirant the day of your surgery.  -  Do NOT wear any jewelry.    Questions or Concerns:  If your surgery is at the Ambulatory Surgery Center, please call [phone]. (Mon - Fri 8 am- 3:30 pm)  Questions about surgery, contact your Surgeons office.  If you have any health changes prior to surgery, please notify your Surgeon.    AFTER YOUR SURGERY  Breathing exercises   Breathing exercises help you recover faster. Take deep breaths and let the air out slowly. This will:     Help you wake up after surgery.    Help prevent complications like pneumonia.  Preventing complications will help you go home sooner.   Nausea and vomiting   You may feel sick to your stomach after surgery; if so, let your nurse know.    Pain control:  After surgery, you may have pain. Our goal is to help you manage your pain. Pain medicine will help you feel comfortable enough to do activities that will help you heal.  These activities may include breathing exercises, walking and physical therapy.   To help your health care team treat your pain we will ask: 1) If you have pain  2) where it is located 3) describe your pain in your words  Methods of pain control include medications given by mouth, vein or by nerve block for some surgeries.  Sequential Compression Device (SCD) or Pneumo Boots:  You may need to wear SCD S on your legs or feet. These are wraps connected to a machine that pumps in air and releases it. The repeated pumping helps prevent blood clots from forming.

## 2020-03-12 NOTE — H&P
Pre-Operative H & P     CC:  Preoperative exam to assess for increased cardiopulmonary risk while undergoing surgery and anesthesia.    Date of Encounter: 3/12/2020  Primary Care Physician:  No Ref-Primary, Physician  Reason for visit: HANNAH (obstructive sleep apnea) [G47.33]  HPI  Speedy Carlson is a 59 year old male who presents for pre-operative H & P in preparation for DRUG-INDUCED SLEEP ENDOSCOPY with Dr. Marroquin on 3/18/20 at [facility]. History is obtained from the patient and medical records.    Patient who was recently evaluated by Dr. Marroquin for his history of HANNAH diagnosed in 2018. He has tried multiple strategies but has not been able to tolerate CPAP. Due to concern for family history of heart disease he wanted to see if he was a candidate for hypoglossal nerve stimulation therapy. He was counseled for above procedure for further evaluation.     His history is otherwise significant for obesity, HLD, asthma in childhood, insomnia and nephrolithiasis. He is currently followed by the Weight Loss Management team on Contrave.     Past Medical History  Past Medical History:   Diagnosis Date     Acute prostatitis 12/04     Calculus of kidney 1994    no recurrence     Mild intermittent asthma     hx in childhood     Mixed hyperlipidemia      Obesity      HANNAH (obstructive sleep apnea)      Other acne      Varicella without mention of complication        Past Surgical History  Past Surgical History:   Procedure Laterality Date     COLONOSCOPY  8 years ago     HC REMOVAL OF TONSILS,<13 Y/O      s/p Tonsillectomy     HC TOOTH EXTRACTION W/FORCEP      wisdom teeth     PHOTOREFRACTIVE KERATECTOMY      s/p Lasik surgery       Hx of Blood transfusions/reactions: Denies.      Hx of abnormal bleeding or anti-platelet use: ASA 81 mg daily.     Menstrual history: No LMP for male patient.    Steroid use in the last year: Denies.     Personal or FH with difficulty with Anesthesia:  Denies.     Prior to  Admission Medications  Current Outpatient Medications   Medication Sig Dispense Refill     aspirin 81 MG tablet Take 81 mg by mouth daily       fenofibrate 145 MG tablet Take  by mouth daily.       losartan (COZAAR) 50 MG tablet Take 50 mg by mouth daily        naltrexone-bupropion (CONTRAVE) 8-90 MG per 12 hr tablet Week 1: 1 tablet daily in AM; Week 2: 1 tablet in AM & PM, Week 3: 2 tablets in AM & 1 tablet in PM, Week 4 & on: 2 tablets in AM & PM. (Patient taking differently: Take 2 tablets by mouth 2 times daily ) 120 tablet 3     pravastatin (PRAVACHOL) 40 MG tablet Take 40 mg by mouth daily        traZODone (DESYREL) 50 MG tablet Take 150 mg by mouth At Bedtime          Allergies  Allergies   Allergen Reactions     Azithromycin Rash     Skin peeling       Social History  Social History     Socioeconomic History     Marital status:      Spouse name: Acosta Zuleta     Number of children: 0     Years of education: 18     Highest education level: Not on file   Occupational History     Occupation: Research Analyst     Employer: [employer]   Social Needs     Financial resource strain: Not on file     Food insecurity     Worry: Not on file     Inability: Not on file     Transportation needs     Medical: Not on file     Non-medical: Not on file   Tobacco Use     Smoking status: Never Smoker     Smokeless tobacco: Never Used     Tobacco comment: cigar occ   Substance and Sexual Activity     Alcohol use: Yes     Comment: 2-4 beers per week     Drug use: No     Sexual activity: Yes     Partners: Female     Birth control/protection: Pill   Lifestyle     Physical activity     Days per week: Not on file     Minutes per session: Not on file     Stress: Not on file   Relationships     Social connections     Talks on phone: Not on file     Gets together: Not on file     Attends [employer] service: Not on file     Active member of club or organization: Not on file     Attends meetings of clubs or organizations:  Not on file     Relationship status: Not on file     Intimate partner violence     Fear of current or ex partner: Not on file     Emotionally abused: Not on file     Physically abused: Not on file     Forced sexual activity: Not on file   Other Topics Concern     Not on file   Social History Narrative    Social Documentation:        Balanced Diet: YES    Calcium intake: 1-2 per day    Caffeine: 1 cups coffee per day-during week    Exercise:  type of activity walk;  3 days / week  exercycle, treadmill    Sunscreen: Yes    Seatbelts:  Yes    Self Testicular Exam: Yes    Physical/Emotional/Sexual Abuse: No     Do you feel safe in your environment? Yes        Cholesterol screen up to date: No-Discussed    Eye Exam up to date: Yes    Dental Exam up to date: Yes    Dexa Scan up to date: Does Not Apply    Colonoscopy up to date: Does Not Apply    Immunizations up to date: Yes     Glucose screen if over 40:  No- Discussed        Brittany Canada MA       Family History  Family History   Problem Relation Age of Onset     Cancer Father         lung     Breast Cancer Mother         at age  45     Depression Mother      Lipids Mother      Obesity Mother      Eye Disorder Mother      C.A.D. Brother         MI at age 40     Lipids Brother      Preop Vitals    BP Readings from Last 3 Encounters:   03/11/20 114/85   11/25/19 (!) 152/87   08/12/14 128/84    Pulse Readings from Last 3 Encounters:   03/11/20 83   11/25/19 93   08/12/14 92      Resp Readings from Last 3 Encounters:   01/04/13 18   08/10/10 20   07/22/09 20    SpO2 Readings from Last 3 Encounters:   03/11/20 98%   11/25/19 97%   08/12/14 99%      Temp Readings from Last 1 Encounters:   03/11/20 98.3  F (36.8  C) (Oral)    Ht Readings from Last 1 Encounters:   03/11/20 1.829 m (6')      Wt Readings from Last 1 Encounters:   03/11/20 105.8 kg (233 lb 3.2 oz)    Estimated body mass index is 31.63 kg/m  as calculated from the following:    Height as of 3/11/20: 1.829 m  "(6').    Weight as of 3/11/20: 105.8 kg (233 lb 3.2 oz).     The complete review of systems is negative other than noted in the HPI or here.     ENT/Pulmonary: Comment: Asthma in childhood    (+)sleep apnea, other ENT- tinnitus, tobacco use, Current use occ cigar packs/day  , . .    Neurologic:  - neg neurologic ROS     Cardiovascular:     (+) Dyslipidemia, hypertension----. Taking blood thinners Pt has received instructions: Instructions Given to patient: Will remain on up to DOS. . . :. . Previous cardiac testing date:results:date: results:ECG reviewed date:2010 results:SR date: results:          METS/Exercise Tolerance:  >4 METS   Hematologic:  - neg hematologic  ROS       Musculoskeletal:  - neg musculoskeletal ROS       GI/Hepatic:  - neg GI/hepatic ROS       Renal/Genitourinary:     (+) Nephrolithiasis ,       Endo:     (+) Obesity, .      Psychiatric:  - neg psychiatric ROS       Infectious Disease:  - neg infectious disease ROS       Malignancy:      - no malignancy   Other:    (+) No chance of pregnancy C-spine cleared: N/A, no H/O Chronic Pain,no other significant disability            PHYSICAL EXAM:   Mental Status/Neuro: A/A/O; Age Appropriate   Airway: Facies: Feasible  Mallampati: I  Mouth/Opening: Full  TM distance: > 6 cm  Neck ROM: Full   Respiratory: Auscultation: CTAB     Resp. Rate: Normal     Resp. Effort: Normal      CV: Rhythm: Regular  Heart: Normal Sounds   Comments:        Temp: 98  F (36.7  C) Temp src: Oral BP: 129/79 Pulse: 88   Resp: 14 SpO2: 97 %         234 lbs 0 oz  6' 0\"   Body mass index is 31.74 kg/m .       Physical Exam  Constitutional: Awake, alert, cooperative, no apparent distress, and appears stated age.  Eyes: Pupils equal, round and reactive to light, extra ocular muscles intact, sclera clear, conjunctiva normal. Glasses on.  HENT: Normocephalic, oral pharynx with moist mucus membranes, good dentition. No goiter appreciated.   Respiratory: Clear to auscultation " bilaterally, no crackles or wheezing. No cough or obvious dyspnea.  Cardiovascular: Regular rate and rhythm, normal S1 and S2, and no murmur noted.  Carotids +2, no bruits. No edema. Palpable pulses to radial  DP and PT arteries.   GI: Normal bowel sounds, soft, non-distended, non-tender, no masses palpated, no hepatosplenomegaly.    Lymph/Hematologic: No cervical lymphadenopathy and no supraclavicular lymphadenopathy.  Genitourinary:  Deferred.   Skin: Warm and dry.  No rashes at anticipated surgical site.   Musculoskeletal: Full ROM of neck. There is no redness, warmth, or swelling of the joints. Gross motor strength is normal.    Neurologic: Awake, alert, oriented to name, place and time. Cranial nerves II-XII are grossly intact. Gait is normal.   Neuropsychiatric: Calm, cooperative. Normal affect.     Labs: (personally reviewed) OSH 2/3/20  Na 136  K 4.6  Cl 106  Glu 97  Cr 1.09  GFR >60  EKG: Personally reviewed  Sinus rhythm    CT Cardiac Calcium Score   CONCLUSIONS:  1. Total Agatston calcium score is 22. Although the absolute score is low, this amount of coronary artery calcification places this individual at the 63rd percentile when compared to an age and gender race matched control group and implies an overall intermediate risk of cardiac events in the next 10 years.   2. The presence of any coronary artery calcification denotes the presence of coronary artery disease.   CORONARY CALCIUM SCORE FINDINGS: The total Agatston calcium score is 22.   Left main: 0, left anterior descendin, left circumflex: 0, right   coronary artery: 0. This amount of coronary artery calcification places this individual at the 63rd percentile when compared to an age, gender, and race matched control group and implies an overall intermediate risk of cardiac event in the next 10 years.     ADDITIONAL FINDINGS: Normal sized aortic root and visualized portion of the ascending and descending thoracic aorta. Pericardium  appears normal.     Imaging reviewed by this provider      Outside records reviewed from: Care Everywhere    ASSESSMENT and PLAN  Speedy Carlson is a 59 year old male scheduled to undergo DRUG-INDUCED SLEEP ENDOSCOPY with Dr. Marroquin on 3/18/20. He has the following specific operative considerations:   - RCRI : No serious cardiac risks.    - Anesthesia considerations:  Refer to PAC assessment in anesthesia records  - VTE risk: 0.5%  - Risk of PONV score = 2.  If > 2, anti-emetic intervention recommended.    --HANNAH with inability to tolerate CPAP. Above procedure now planned for further evaluation with MAC. Patient comfortable with plan.   --Mixed hyperlipidemia. Will take pravastatin and fenofibrate on DOS. HYPERTENSION. Will take Losartan on DOS. No other cardiac history or symptoms. ASA 81 mg daily and will take on DOS. Coronary calcium score 22 in 2016. Good exercise tolerance.   --Nonsmoker. Denies pulmonary symptoms. Childhood asthma. No use of inhaler.   --Following with Weight Loss Management team. Will take Contrave on DOS.  --Insomnia. Trazodone at HS.    Arrival time, NPO, shower and medication instructions provided by nursing staff today. Preparing For Your Surgery handout given.      Patient was discussed with Dr Garcia.    RORO Suresh CNS  Preoperative Assessment Center  [facility]  Phone: [phone]  Fax: [phone]

## 2020-03-12 NOTE — ANESTHESIA PREPROCEDURE EVALUATION
Anesthesia Pre-Procedure Evaluation    Patient: Speedy Carlson   MRN:     [id number] Gender:   male   Age:    59 year old :      1960        Preoperative Diagnosis: HANNAH (obstructive sleep apnea) [G47.33]   Procedure(s):  DRUG-INDUCED SLEEP ENDOSCOPY     LABS:  CBC:   Lab Results   Component Value Date    WBC 7.3 2014    WBC 9.1 2010    HGB 14.6 2014    HGB 14.1 2010    HCT 43.4 2014    HCT 42.9 2010     2014     2010     BMP:   Lab Results   Component Value Date     2014    POTASSIUM 4.2 2014    CHLORIDE 104 2014    CO2 26 2014    BUN 14 2014    CR 1.17 2014    GLC 97 2014    GLC 80 2007     COAGS: No results found for: PTT, INR, FIBR  POC: No results found for: BGM, HCG, HCGS  OTHER:   Lab Results   Component Value Date    RAVI 9.1 2014    ALBUMIN 4.1 2014    PROTTOTAL 7.5 2014    ALT 48 2014    AST 27 2014    ALKPHOS 72 2014    BILITOTAL 0.4 2014    TSH 0.74 2014    T4 1.09 2010        Preop Vitals    BP Readings from Last 3 Encounters:   20 114/85   19 (!) 152/87   14 128/84    Pulse Readings from Last 3 Encounters:   20 83   19 93   14 92      Resp Readings from Last 3 Encounters:   13 18   08/10/10 20   09 20    SpO2 Readings from Last 3 Encounters:   20 98%   19 97%   14 99%      Temp Readings from Last 1 Encounters:   20 98.3  F (36.8  C) (Oral)    Ht Readings from Last 1 Encounters:   20 1.829 m (6')      Wt Readings from Last 1 Encounters:   20 105.8 kg (233 lb 3.2 oz)    Estimated body mass index is 31.63 kg/m  as calculated from the following:    Height as of 3/11/20: 1.829 m (6').    Weight as of 3/11/20: 105.8 kg (233 lb 3.2 oz).     LDA:        Past Medical History:   Diagnosis Date     Acute prostatitis      Calculus of kidney     no  recurrence     Mild intermittent asthma     hx in childhood     Mixed hyperlipidemia      Obesity      HANNAH (obstructive sleep apnea)      Other acne      Varicella without mention of complication       Past Surgical History:   Procedure Laterality Date     HC REMOVAL OF TONSILS,<13 Y/O      s/p Tonsillectomy     HC TOOTH EXTRACTION W/FORCEP      wisdom teeth     PHOTOREFRACTIVE KERATECTOMY      s/p Lasik surgery      Allergies   Allergen Reactions     No Known Allergies      Azithromycin Rash     Skin peeling        Anesthesia Evaluation     . Pt has had prior anesthetic. Type: General    No history of anesthetic complications          ROS/MED HX    ENT/Pulmonary: Comment: Asthma in childhood. Unable to tolerate CPAP.    (+)sleep apnea, other ENT- tinnitus, tobacco use, Past use doesn't use CPAP , . .    Neurologic:  - neg neurologic ROS     Cardiovascular:     (+) Dyslipidemia, hypertension----. Taking blood thinners Pt has received instructions: Instructions Given to patient: Will remain on up to DOS. . . :. . Previous cardiac testing date:results:date: results:ECG reviewed date:2010 results:SR date: results:          METS/Exercise Tolerance:  >4 METS   Hematologic:  - neg hematologic  ROS       Musculoskeletal:  - neg musculoskeletal ROS       GI/Hepatic:  - neg GI/hepatic ROS       Renal/Genitourinary:     (+) Nephrolithiasis ,       Endo:     (+) Obesity, .      Psychiatric:  - neg psychiatric ROS       Infectious Disease:  - neg infectious disease ROS       Malignancy:      - no malignancy   Other:    (+) No chance of pregnancy C-spine cleared: N/A, no H/O Chronic Pain,no other significant disability                        PHYSICAL EXAM:   Mental Status/Neuro: A/A/O; Age Appropriate   Airway: Facies: Feasible  Mallampati: I  Mouth/Opening: Full  TM distance: > 6 cm  Neck ROM: Full   Respiratory: Auscultation: CTAB     Resp. Rate: Normal     Resp. Effort: Normal      CV: Rhythm: Regular  Heart: Normal  Sounds  Edema: None   Comments:      Dental: Normal Dentition                JZG FV AN PLAN NO PONV RULE       PAC Discussion and Assessment    ASA Classification: 3  Case is suitable for: [facility]  Anesthetic techniques and relevant risks discussed: MAC with GA as backup  Invasive monitoring and risk discussed: No  Types:   Possibility and Risk of blood transfusion discussed: No  NPO instructions given:   Additional anesthetic preparation and risks discussed:   Needs early admission to pre-op area:   Other:     PAC Resident/NP Anesthesia Assessment:  Speedy Carlson is a 59 year old male scheduled to undergo DRUG-INDUCED SLEEP ENDOSCOPY with Dr. Marroquin on 3/18/20. He has the following specific operative considerations:   - RCRI : No serious cardiac risks.    - VTE risk: 0.5%  - Risk of PONV score = 2.  If > 2, anti-emetic intervention recommended.    --HANNAH with inability to tolerate CPAP. Above procedure now planned for further evaluation with MAC. Patient comfortable with plan.   --Mixed hyperlipidemia. Will take pravastatin and fenofibrate on DOS. HYPERTENSION. Will take Losartan on DOS. No other cardiac history or symptoms. ASA 81 mg daily and will take on DOS. Coronary calcium score 22 in 2016. Good exercise tolerance.   --Nonsmoker. Denies pulmonary symptoms. Childhood asthma. No use of inhaler.   --Following with Weight Loss Management team. Will take Contrave on DOS.  --Insomnia. Trazodone at HS.        Patient was discussed with Dr Garcia.        Reviewed and Signed by PAC Mid-Level Provider/Resident  Mid-Level Provider/Resident: RORO Atkinson, CNS  Date: 3/12/20  Time: 1:30pm    Attending Anesthesiologist Anesthesia Assessment:        Anesthesiologist:   Date:   Time:   Pass/Fail:   Disposition:     PAC Pharmacist Assessment:        Pharmacist:   Date:   Time:    RORO Suresh

## 2020-03-12 NOTE — PROGRESS NOTES
Preoperative Assessment Center medication history for March 12, 2020 is complete.    See Epic admission navigator for prior to admission medications.   Operating room staff will still need to confirm medications and last dose information on day of surgery.     Medication history interview sources:  Patient Interview     Changes made to PTA medication list (reason)  Added: none    Deleted:   -- ambien     Changed: None    Additional medication history information (including reliability of information, actions taken by pharmacist):    -- No recent (within 30 days) course of antibiotics  -- No recent (within 30 days) course of steroids  -- No recent (within 30 days) chronic daily medications stopped   -- Patient declines being on any other prescription or over-the-counter medications  -- patient was informed to continue his contrave for the endoscopy that he is having as he shouldn't require post procedural opioids.     Prior to Admission medications    Medication Sig Last Dose Taking? Auth Provider   aspirin 81 MG tablet Take 81 mg by mouth daily Taking Yes Reported, Patient   fenofibrate 145 MG tablet Take  by mouth daily. Taking Yes Reported, Patient   losartan (COZAAR) 50 MG tablet Take 50 mg by mouth daily  Taking Yes Reported, Patient   naltrexone-bupropion (CONTRAVE) 8-90 MG per 12 hr tablet Week 1: 1 tablet daily in AM; Week 2: 1 tablet in AM & PM, Week 3: 2 tablets in AM & 1 tablet in PM, Week 4 & on: 2 tablets in AM & PM.  Patient taking differently: Take 2 tablets by mouth 2 times daily  Taking Yes Mary Childers PA-C   pravastatin (PRAVACHOL) 40 MG tablet Take 40 mg by mouth daily  Taking Yes Reported, Patient   traZODone (DESYREL) 50 MG tablet Take 150 mg by mouth At Bedtime  Taking Yes Reported, Patient         Medication history completed by: Jacobo Lozano RPH

## 2020-04-09 ENCOUNTER — ALLIED HEALTH/NURSE VISIT (OUTPATIENT)
Dept: PHARMACY | Facility: CLINIC | Age: 60
End: 2020-04-09
Payer: COMMERCIAL

## 2020-04-09 DIAGNOSIS — E66.09 CLASS 1 OBESITY DUE TO EXCESS CALORIES WITH SERIOUS COMORBIDITY AND BODY MASS INDEX (BMI) OF 34.0 TO 34.9 IN ADULT: Primary | ICD-10-CM

## 2020-04-09 DIAGNOSIS — E78.5 HYPERLIPIDEMIA LDL GOAL <130: ICD-10-CM

## 2020-04-09 DIAGNOSIS — E66.811 CLASS 1 OBESITY DUE TO EXCESS CALORIES WITH SERIOUS COMORBIDITY AND BODY MASS INDEX (BMI) OF 34.0 TO 34.9 IN ADULT: Primary | ICD-10-CM

## 2020-04-09 DIAGNOSIS — I10 BENIGN ESSENTIAL HYPERTENSION: ICD-10-CM

## 2020-04-09 PROCEDURE — 99607 MTMS BY PHARM ADDL 15 MIN: CPT | Performed by: PHARMACIST

## 2020-04-09 PROCEDURE — 99606 MTMS BY PHARM EST 15 MIN: CPT | Performed by: PHARMACIST

## 2020-04-09 NOTE — PROGRESS NOTES
MTM ENCOUNTER  SUBJECTIVE/OBJECTIVE:                           Speedy Carlson is a 59 year old male called for a follow-up visit. He was referred to me from Mray hCilders PA-C.  Today's visit is a follow-up MTM visit from 1/2/2020.     Patient consented to a telehealth visit: yes    Chief Complaint: Contrave working well.     Allergies/ADRs: Reviewed in Epic  Tobacco:  reports that he has never smoked. He has never used smokeless tobacco.  Alcohol: not currently using  Caffeine: 1 cups/day of coffee  Activity: see below  PMH: Reviewed in Epic    Medication Adherence/Access: no issues reported. He does report that he would like to be on as little of medications as possible, but does understands that for certain circumstances he does require medications.     Obesity:  Contrave 8-90 mg tablet: 2 tablets twice daily    Followed by Shaila Wooten NP, last seen 11/25/2019 for [facility]. Reports that he is doing well and down to 226 lb today since starting Contrave. Goal is to be <200 lb. He reports that Contrave is still working for him. He is still progressively losing well.   Weight History: Around age 41 started struggling with weight loss. He worries about his health due to family history of heart issues. He wants to try and get healthier due to this.  Diet/Eating Habits: Patient reports that for him his largest issues were cravings/snacking, but this has minimized since starting Contrave. He is eating 3 meals per day, trying not to snack between. Noticing he continues to eat sensible portions.   Exercise/Activity: Patient reports that his activity has declined, was working out at the gym at his work. Has been walking more, a couple times per week.   Failed medications include: Saxenda: GI upset, nausea, abdominal pain.     Initial Consult Weight 11/25/2019: 252 lb 4.8 oz   Weight today on scale at home: 226 lb   Wt Readings from Last 4 Encounters:   03/12/20 234 lb (106.1 kg)   03/11/20 233 lb  3.2 oz (105.8 kg)   02/04/20 240 lb (108.9 kg)   11/25/19 252 lb 4.8 oz (114.4 kg)     Estimated body mass index is 31.74 kg/m  as calculated from the following:    Height as of 3/12/20: 6' (1.829 m).    Weight as of 3/12/20: 234 lb (106.1 kg).    Hypertension:   Losartan 50 mg daily.     Patient does not self-monitor BP, has a monitor at home. Patient reports no current medication side effects.    [facility] 2/3/2020:   Creatinine 1.09   GFR >60   Potassium 4.6     BP Readings from Last 3 Encounters:   03/12/20 129/79   03/11/20 114/85   11/25/19 (!) 152/87     Hyperlipidemia/Primary Prevention:   Pravastatin 40mg once daily    Fenofibrate 145 mg daily   Aspirin 81 mg daily     Pt reports no significant myalgias or other side effects. Brother has history of heart attack, 5 vessel CABG. Another brother was found to have mild coronary artery disease and coronary spasm. Mother suffered 2 heart attacks and stenting in last 60s/early 70s, also had 2 TIAs.  No issues of bleeding or bruising. Patient reports that he has lipids checked 2 times per year, most recent results per patient report below as not available through [facility]. He reports since 2012, highest triglyceride was 197, which is when he was started on fenofibrate. Tried two other statins prior, then turned to pravastatin and tolerated well. He can't remember name of other statins. Has never tried rosuvastatin (Crestor).    2/2020 lipid panel: Total cholesterol 126, Triglycerides <45, HDL 26  (infomration from labs read from his [employer] at other health system).    1/12/18   4:10 PM      Contains abnormal data  LIPID PANEL W REFLEX MEASURED LDL   Order: [id number]   (suggestion)   Information displayed in this report will not trend or trigger automated decision support.     Ref Range & Units  Value    CHOLESTEROL,TOTAL  100 - 199 mg/dL  183    TRIGLYCERIDES  <150 mg/dL  92    HDL CHOLESTEROL  >40 mg/dL  39 Low      NON-HDL CHOLESTEROL  <145 mg/dl  144     CHOL/HDL RATIO             <4.50  4.69 High      LDL CHOLESTEROL  <=130 mg/dL  126        Today's Vitals: There were no vitals taken for this visit. Telemedicine visit. No vitals.     ASSESSMENT:                            Medication Adherence: good, no issues identified    Obesity: Progressing, Would benefit from continuing Contrave and focusing on exercise goal for now.     Hypertension: Stable from last BP in clinic as <140/90 mmHg and labs WNL.     Hyperlipidemia/Primary Prevention: Needs improvement. Due to recent Trig of <45, HDL <45 and family history (per patient report), could consider modification of cholesterol medication regimen. Fibrate may not be necessary due to Trig <45. Due to family history could consider maximizing statin. May benefit from 1) stopping pravastatin 40 mg daily and fenofibrate 145 mg daily and 2) starting rosuvastatin 40 mg daily. Change to rosuvastatin may also assist with elevating HDL and further lowering LDL comparative to pravastatin. Likelihood patient would also tolerate rosuvastatin due to both pravastatin and rosuvastatin being hydrophilic, lesser risk of myalgias. Lesser risk of myalgias potentially anyhow since would no longer be on statin + fibrate? Could consider following up with PCP regarding this in future.     PLAN:                            1. Could consider optimizing cholesterol medications:   -Stopping pravastatin and fenofibrate and transitioning to rosuvastatin 40 mg daily     2. Exercise goal: get out on a walk at least 30 minutes every day.     I spent 30 minutes with this patient today. I offer these suggestions for consideration by primary care provider. A copy of the visit note was provided to the patient's referring provider.    Will follow up in 3 months.    The patient declined a summary of these recommendations.     Lauren Bloch, PharmD  Medication Therapy Management Pharmacist   [facility]   Phone: [phone]

## 2020-04-09 NOTE — Clinical Note
SHANE, no changes - he continues to do well on Contrave. We actually discussed his lipid regimen quite a bit. His las Trig was <45. Albertina PACHECO

## 2020-05-13 ENCOUNTER — TELEPHONE (OUTPATIENT)
Dept: OTOLARYNGOLOGY | Facility: CLINIC | Age: 60
End: 2020-05-13

## 2020-05-13 NOTE — TELEPHONE ENCOUNTER
Called patient to let him know that we are okay to proceed with scheduling DISE with Dr. Marroquin. Patient needs to look at his calendar, will call back tomorrow. Direct dial number given.         Shaila Vann   Perioperative Coordinator   Department of Otolaryngology    Office: [phone]

## 2020-05-14 DIAGNOSIS — Z11.59 ENCOUNTER FOR SCREENING FOR OTHER VIRAL DISEASES: Primary | ICD-10-CM

## 2020-05-14 NOTE — TELEPHONE ENCOUNTER
"Called patient to schedule surgery with Dr. Marroquin.   Date of Surgery: 06/03/2020  Location: [facility] OR Procedure room     PAC or PCP:  PAC virtual visit approved by Nayely   Post op: 1 week post op, Dr. Marroquin  Imaging: N/A         Additional comments:   Patient verbalized understanding: YES   \"You will be receiving a phone call to set up COVID-19 curbside testing at a [facility] location.  Pre-op testing is required and needs to be completed 48 - 72 hours prior to surgery.  If this is not completed, your surgery will be canceled.  After testing is completed, you are required to isolate yourself until the morning of surgery.   If you have questions about testing, please contact your RN or surgery coordinator.\"      Shaila Vann   Perioperative Coordinator   Department of Otolaryngology  P: [phone]       "

## 2020-05-15 ENCOUNTER — TELEPHONE (OUTPATIENT)
Dept: OTOLARYNGOLOGY | Facility: CLINIC | Age: 60
End: 2020-05-15

## 2020-05-15 NOTE — TELEPHONE ENCOUNTER
FUTURE VISIT INFORMATION      SURGERY INFORMATION:    Date: 6/3/20    [facility]    Surgeon:  Gladys Marroquin MD     Anesthesia Type:  MAC    Procedure: DRUG-INDUCED SLEEP ENDOSCOPY    Consult: OV 20- Cara     RECORDS REQUESTED FROM:         Primary Care Provider: Steve Mcmahon MD- [place]     Pertinent Medical History: HANNAH, hypertension     Most recent EKG+ Tracin/22/10     Most recent Sleep Study: 19

## 2020-05-15 NOTE — TELEPHONE ENCOUNTER
Patient inquired about his visit on 5/21/2020 for his pre-op wondering why the location states that his coming into the [facility]. Informed him that I can't schedule video visits, but ensure him the provider okay'ed this. Someone will contact him regarding instructions to set it up.

## 2020-05-21 ENCOUNTER — PRE VISIT (OUTPATIENT)
Dept: SURGERY | Facility: CLINIC | Age: 60
End: 2020-05-21

## 2020-05-21 ENCOUNTER — VIRTUAL VISIT (OUTPATIENT)
Dept: SURGERY | Facility: CLINIC | Age: 60
End: 2020-05-21
Payer: COMMERCIAL

## 2020-05-21 ENCOUNTER — ANESTHESIA EVENT (OUTPATIENT)
Dept: SURGERY | Facility: AMBULATORY SURGERY CENTER | Age: 60
End: 2020-05-21

## 2020-05-21 VITALS — HEIGHT: 72 IN | WEIGHT: 224 LBS | HEART RATE: 84 BPM | BODY MASS INDEX: 30.34 KG/M2

## 2020-05-21 DIAGNOSIS — Z01.818 PREOP EXAMINATION: Primary | ICD-10-CM

## 2020-05-21 ASSESSMENT — MIFFLIN-ST. JEOR: SCORE: 1874.06

## 2020-05-21 ASSESSMENT — PAIN SCALES - GENERAL: PAINLEVEL: NO PAIN (0)

## 2020-05-21 ASSESSMENT — LIFESTYLE VARIABLES: TOBACCO_USE: 1

## 2020-05-21 NOTE — H&P
Pre-Operative H & P     CC:  Preoperative exam to assess for increased cardiopulmonary risk while undergoing surgery and anesthesia.    Date of Encounter: 2020  Primary Care Physician:  No Ref-Primary, Physician  Reason for visit: HANNAH (obstructive sleep apnea) [G47.33]      HPI  Speedy Carlson is a 59 year old male who presents for pre-operative H & P in preparation for DRUG-INDUCED SLEEP ENDOSCOPY with Dr. Marroquin on 6/3/20 at [facility]. History is obtained from the patient and medical records.     At the beginning of this visit patient ID was confirmed using name and .     Video-Visit Details    Type of service:  Video Visit    Patient verbally consented to video service today: YES      Video Start Time: 9:06 am  Video End Time (time video stopped): 9:16 am    Originating Location (pt. Location): Home    Distant Location (provider location):  [facility]    Mode of Communication:  Video Conference via [employer]    Patient who was recently evaluated by Dr. Marroquin for his history of HANNAH diagnosed in 2018. He has tried multiple strategies but has not been able to tolerate CPAP. Due to concern for family history of heart disease he wanted to see if he was a candidate for hypoglossal nerve stimulation therapy. He was counseled for above procedure for further evaluation.      His history is otherwise significant for obesity, HLD, asthma in childhood, insomnia and nephrolithiasis. He is currently followed by the Weight Loss Management team on Contrave.    Past Medical History  Past Medical History:   Diagnosis Date     Acute prostatitis      Calculus of kidney     no recurrence     Mild intermittent asthma     hx in childhood     Mixed hyperlipidemia      Obesity      HANNAH (obstructive sleep apnea)      Other acne      Varicella without mention of complication        Past Surgical History  Past Surgical History:   Procedure Laterality Date      COLONOSCOPY  8 years ago     HC REMOVAL OF TONSILS,<14 Y/O      s/p Tonsillectomy     HC TOOTH EXTRACTION W/FORCEP      wisdom teeth     PHOTOREFRACTIVE KERATECTOMY      s/p Lasik surgery       Hx of Blood transfusions/reactions: Denies.      Hx of abnormal bleeding or anti-platelet use: ASA 81 mg daily.     Menstrual history: No LMP for male patient.    Steroid use in the last year: Denies.     Personal or FH with difficulty with Anesthesia:  Denies.     Prior to Admission Medications  Current Outpatient Medications   Medication Sig Dispense Refill     albuterol (PROAIR HFA/PROVENTIL HFA/VENTOLIN HFA) 108 (90 Base) MCG/ACT inhaler Inhale 2 puffs into the lungs every 6 hours as needed for shortness of breath / dyspnea or wheezing       aspirin 81 MG tablet Take 81 mg by mouth daily       augmented betamethasone dipropionate (DIPROLENE) 0.05 % external gel Apply topically 2 times daily as needed       fenofibrate 145 MG tablet Take 145 mg by mouth daily        losartan (COZAAR) 50 MG tablet Take 50 mg by mouth daily        naltrexone-bupropion (CONTRAVE) 8-90 MG per 12 hr tablet Week 1: 1 tablet daily in AM; Week 2: 1 tablet in AM & PM, Week 3: 2 tablets in AM & 1 tablet in PM, Week 4 & on: 2 tablets in AM & PM. (Patient taking differently: Take 2 tablets by mouth 2 times daily ) 120 tablet 3     pravastatin (PRAVACHOL) 40 MG tablet Take 40 mg by mouth At Bedtime        traZODone (DESYREL) 50 MG tablet Take 150 mg by mouth At Bedtime          Allergies  Allergies   Allergen Reactions     Azithromycin Rash     Skin peeling       Social History  Social History     Socioeconomic History     Marital status:      Spouse name: Acosta Zuleta     Number of children: 0     Years of education: 18     Highest education level: Not on file   Occupational History     Occupation: Research Analyst     Employer: [employer]   Social Needs     Financial resource strain: Not on file     Food insecurity     Worry: Not  on file     Inability: Not on file     Transportation needs     Medical: Not on file     Non-medical: Not on file   Tobacco Use     Smoking status: Never Smoker     Smokeless tobacco: Never Used     Tobacco comment: cigar occ   Substance and Sexual Activity     Alcohol use: Yes     Comment: 2-4 beers per week     Drug use: No     Sexual activity: Yes     Partners: Female     Birth control/protection: Pill   Lifestyle     Physical activity     Days per week: Not on file     Minutes per session: Not on file     Stress: Not on file   Relationships     Social connections     Talks on phone: Not on file     Gets together: Not on file     Attends Jehovah's witness service: Not on file     Active member of club or organization: Not on file     Attends meetings of clubs or organizations: Not on file     Relationship status: Not on file     Intimate partner violence     Fear of current or ex partner: Not on file     Emotionally abused: Not on file     Physically abused: Not on file     Forced sexual activity: Not on file   Other Topics Concern     Not on file   Social History Narrative    Social Documentation:        Balanced Diet: YES    Calcium intake: 1-2 per day    Caffeine: 1 cups coffee per day-during week    Exercise:  type of activity walk;  3 days / week  exercycle, treadmill    Sunscreen: Yes    Seatbelts:  Yes    Self Testicular Exam: Yes    Physical/Emotional/Sexual Abuse: No     Do you feel safe in your environment? Yes        Cholesterol screen up to date: No-Discussed    Eye Exam up to date: Yes    Dental Exam up to date: Yes    Dexa Scan up to date: Does Not Apply    Colonoscopy up to date: Does Not Apply    Immunizations up to date: Yes     Glucose screen if over 40:  No- Discussed        Brittany Canada MA       Family History  Family History   Problem Relation Age of Onset     Cancer Father         lung     Breast Cancer Mother         at age  45     Depression Mother      Lipids Mother      Obesity Mother       Eye Disorder Mother      SUPA. Brother         MI at age 40     Lipids Brother      Preop Vitals    BP Readings from Last 3 Encounters:   03/12/20 129/79   03/11/20 114/85   11/25/19 (!) 152/87    Pulse Readings from Last 3 Encounters:   03/12/20 88   03/11/20 83   11/25/19 93      Resp Readings from Last 3 Encounters:   03/12/20 14   01/04/13 18   08/10/10 20    SpO2 Readings from Last 3 Encounters:   03/12/20 97%   03/11/20 98%   11/25/19 97%      Temp Readings from Last 1 Encounters:   03/12/20 98  F (36.7  C) (Oral)    Ht Readings from Last 1 Encounters:   03/12/20 1.829 m (6')      Wt Readings from Last 1 Encounters:   03/12/20 106.1 kg (234 lb)    Estimated body mass index is 31.74 kg/m  as calculated from the following:    Height as of 3/12/20: 1.829 m (6').    Weight as of 3/12/20: 106.1 kg (234 lb).     ROS/MED HX      The complete review of systems is negative other than noted in the HPI or here.     ENT/Pulmonary: Comment: Asthma in childhood. Unable to tolerate CPAP.    (+)sleep apnea, other ENT- tinnitus, tobacco use, Past use doesn't use CPAP , . .    Neurologic:  - neg neurologic ROS     Cardiovascular:     (+) Dyslipidemia, hypertension----. Taking blood thinners Pt has received instructions: Instructions Given to patient: Will remain on up to DOS. . . :. . Previous cardiac testing date:results:date: results:ECG reviewed date:2010 results:SR date: results:          METS/Exercise Tolerance:  >4 METS   Hematologic:  - neg hematologic  ROS       Musculoskeletal:  - neg musculoskeletal ROS       GI/Hepatic:  - neg GI/hepatic ROS       Renal/Genitourinary:     (+) Nephrolithiasis ,       Endo:     (+) Obesity, .      Psychiatric:  - neg psychiatric ROS       Infectious Disease:  - neg infectious disease ROS       Malignancy:      - no malignancy   Other:    (+) No chance of pregnancy C-spine cleared: N/A, no H/O Chronic Pain,no other significant disability            PHYSICAL EXAM:   Mental Status/Neuro:  "A/A/O; Age Appropriate   Airway: Facies: Feasible  Mallampati: Not Assessed  Mouth/Opening: Not Assessed  TM distance: Not Assessed  Neck ROM: Not Assessed   Respiratory:    CV:    Comments: Virtual visit      224 lbs 0 oz  6' 0\"   Body mass index is 30.38 kg/m .       Physical Exam  Constitutional: Awake, alert, no apparent distress, and appears stated age.  Respiratory: No cough or obvious dyspnea.  Neuropsychiatric: Calm, cooperative. Normal affect.     Labs: (personally reviewed) OSH 2/3/20  Na 136  K 4.6  Cl 106  Glu 97  Cr 1.09  GFR >60  EKG: Personally reviewed  Sinus rhythm    CT Cardiac Calcium Score   CONCLUSIONS:  1. Total Agatston calcium score is 22. Although the absolute score is low, this amount of coronary artery calcification places this individual at the 63rd percentile when compared to an age and gender race matched control group and implies an overall intermediate risk of cardiac events in the next 10 years.   2. The presence of any coronary artery calcification denotes the presence of coronary artery disease.   CORONARY CALCIUM SCORE FINDINGS: The total Agatston calcium score is 22.   Left main: 0, left anterior descendin, left circumflex: 0, right   coronary artery: 0. This amount of coronary artery calcification places this individual at the 63rd percentile when compared to an age, gender, and race matched control group and implies an overall intermediate risk of cardiac event in the next 10 years.     ADDITIONAL FINDINGS: Normal sized aortic root and visualized portion of the ascending and descending thoracic aorta. Pericardium appears normal.      Imaging reviewed by this provider        Outside records reviewed from: Care Everywhere    ASSESSMENT and PLAN  Speedy Carlson is a 59 year old male scheduled to undergo  DRUG-INDUCED SLEEP ENDOSCOPY with Dr. Marroquin on 6/3/20. He has the following specific operative considerations:   - RCRI : No serious cardiac risks.  - Anesthesia " considerations:  Refer to PAC assessment in anesthesia records  - VTE risk: 0.5%  - Risk of PONV score = 2.  If > 2, anti-emetic intervention recommended.    --HANNAH with inability to tolerate CPAP. Above procedure now planned for further evaluation with MAC. Patient comfortable with plan.  --Mixed hyperlipidemia. Will take pravastatin and fenofibrate on DOS. HYPERTENSION. Will take Losartan on DOS. No other cardiac history or symptoms. ASA 81 mg daily and will take on DOS. Coronary calcium score 22 in 2016. Good exercise tolerance.  --Nonsmoker. Denies pulmonary symptoms. Childhood asthma. No use of inhaler.   --Following with Weight Loss Management team. Will take Contrave on DOS.   --Insomnia. Trazodone at HS.    Arrival time, NPO, shower and medication instructions provided by nursing staff today via phone call.    Patient was reviewed by Dr Valadez.    RORO Suresh CNS  Preoperative Assessment Center  [facility]  Phone: [phone]  Fax: [phone]

## 2020-05-21 NOTE — PATIENT INSTRUCTIONS
Preparing for Your Surgery      Name:  Speedy Carlson   MRN:  [id number]   :  1960   Today's Date:  2020     Arriving for surgery:  Surgery date:  6/3/2020  Arrival time:  6:15AM  Due to the COVID 19 crisis, we are trying to keep our patients safe from others who might have respiratory illnesses so the hospital is implementing a no visitor policy.  Also, at this time  parking is not available.  Please come to:     [facility]  [address] [place] [id number]    Please enter through the Surgery Entrance on [facility], at the rear of the building.  A staff member from the Ambulatory Surgery Center(ASC) will meet you there and escort you to the ASC.  This is the same door you will be picked up from when you are ready to leave     Please call [phone] with any questions.    What can I eat or drink?  -  You may have solid food or milk products until 8 hours prior to your surgery. 2020, 11:45PM  -  You may have water, apple juice or 7up/Sprite until 4 hours prior to your surgery. 6/3/2020, 3:45AM    Which medicines can I take?    -  Please take these medications the day of surgery:    Fenofibrate   Naltrexone-Bupropion(Contrave)    Losartan(Cozaar)  Aspirin    Albuterol as needed and bring the day of surgery    How do I prepare myself?  -  Take two showers: one the night before surgery; and one the morning of surgery.         Use Scrubcare or Hibiclens to wash from neck down, leave soap on your skin for up to one minute.  Do not get soap in your eyes or ears.  You may use your own shampoo and conditioner; no other hair products.   -  Do NOT use lotion, powder, deodorant, or antiperspirant the day of your surgery.  -  Do NOT wear jewelry.  - Do not bring your own medications to the hospital, except for inhalers and eye   drops.  -  Bring your ID and insurance card.    -If you are scheduled to go home the Same Day as surgery you must have a responsible adult  as a  and to stay with you overnight the first 24 hours after surgery.     Questions or Concerns:  -If you are scheduled at the Ambulatory Surgery Center and have questions or concerns regarding the day of surgery please call [phone].    -If you have health changes between today and your surgery please call your surgeon. For questions after surgery please call your surgeons office.

## 2020-05-21 NOTE — PROGRESS NOTES
"Speedy Carlson is a 59 year old male who is being evaluated via a billable video visit.      The patient has been notified of following:     \"This video visit will be conducted via a call between you and your physician/provider. We have found that certain health care needs can be provided without the need for an in-person physical exam.  This service lets us provide the care you need with a video conversation.  If a prescription is necessary we can send it directly to your pharmacy.  If lab work is needed we can place an order for that and you can then stop by our lab to have the test done at a later time.    Video visits are billed at different rates depending on your insurance coverage.  Please reach out to your insurance provider with any questions.    If during the course of the call the physician/provider feels a video visit is not appropriate, you will not be charged for this service.\"    Patient has given verbal consent for Video visit? Yes    How would you like to obtain your AVS? Emmanuel    Patient would like the video invitation sent by: Send to e-mail at: [email or web address]    Will anyone else be joining your video visit? No          Ovidio Sena      "

## 2020-05-21 NOTE — ANESTHESIA PREPROCEDURE EVALUATION
Anesthesia Pre-Procedure Evaluation    Patient: Speedy Carlson   MRN:     [id number] Gender:   male   Age:    59 year old :      1960        Preoperative Diagnosis: HANNAH (obstructive sleep apnea) [G47.33]   Procedure(s):  DRUG-INDUCED SLEEP ENDOSCOPY     LABS:  CBC:   Lab Results   Component Value Date    WBC 7.3 2014    WBC 9.1 2010    HGB 14.6 2014    HGB 14.1 2010    HCT 43.4 2014    HCT 42.9 2010     2014     2010     BMP:   Lab Results   Component Value Date     2014    POTASSIUM 4.2 2014    CHLORIDE 104 2014    CO2 26 2014    BUN 14 2014    CR 1.17 2014    GLC 97 2014    GLC 80 2007     COAGS: No results found for: PTT, INR, FIBR  POC: No results found for: BGM, HCG, HCGS  OTHER:   Lab Results   Component Value Date    RAIV 9.1 2014    ALBUMIN 4.1 2014    PROTTOTAL 7.5 2014    ALT 48 2014    AST 27 2014    ALKPHOS 72 2014    BILITOTAL 0.4 2014    TSH 0.74 2014    T4 1.09 2010        Preop Vitals    BP Readings from Last 3 Encounters:   20 129/79   20 114/85   19 (!) 152/87    Pulse Readings from Last 3 Encounters:   20 88   20 83   19 93      Resp Readings from Last 3 Encounters:   20 14   13 18   08/10/10 20    SpO2 Readings from Last 3 Encounters:   20 97%   20 98%   19 97%      Temp Readings from Last 1 Encounters:   20 98  F (36.7  C) (Oral)    Ht Readings from Last 1 Encounters:   20 1.829 m (6')      Wt Readings from Last 1 Encounters:   20 106.1 kg (234 lb)    Estimated body mass index is 31.74 kg/m  as calculated from the following:    Height as of 3/12/20: 1.829 m (6').    Weight as of 3/12/20: 106.1 kg (234 lb).     LDA:        Past Medical History:   Diagnosis Date     Acute prostatitis      Calculus of kidney     no recurrence      Mild intermittent asthma     hx in childhood     Mixed hyperlipidemia      Obesity      HANNAH (obstructive sleep apnea)      Other acne      Varicella without mention of complication       Past Surgical History:   Procedure Laterality Date     COLONOSCOPY  8 years ago     HC REMOVAL OF TONSILS,<11 Y/O      s/p Tonsillectomy     HC TOOTH EXTRACTION W/FORCEP      wisdom teeth     PHOTOREFRACTIVE KERATECTOMY      s/p Lasik surgery      Allergies   Allergen Reactions     Azithromycin Rash     Skin peeling        Anesthesia Evaluation     . Pt has had prior anesthetic. Type: General    No history of anesthetic complications          ROS/MED HX    ENT/Pulmonary: Comment: Asthma in childhood. Unable to tolerate CPAP.    (+)sleep apnea, other ENT- tinnitus, tobacco use, Past use doesn't use CPAP , . .    Neurologic:  - neg neurologic ROS     Cardiovascular:     (+) Dyslipidemia, hypertension----. Taking blood thinners Pt has received instructions: Instructions Given to patient: Will remain on up to DOS. . . :. . Previous cardiac testing date:results:date: results:ECG reviewed date:2010 results:SR date: results:          METS/Exercise Tolerance:  >4 METS   Hematologic:  - neg hematologic  ROS       Musculoskeletal:  - neg musculoskeletal ROS       GI/Hepatic:  - neg GI/hepatic ROS       Renal/Genitourinary:     (+) Nephrolithiasis ,       Endo:     (+) Obesity, .      Psychiatric:  - neg psychiatric ROS       Infectious Disease:  - neg infectious disease ROS       Malignancy:      - no malignancy   Other:    (+) No chance of pregnancy C-spine cleared: N/A, no H/O Chronic Pain,no other significant disability                        PHYSICAL EXAM:   Mental Status/Neuro: A/A/O; Age Appropriate   Airway: Facies: Feasible  Mallampati: Not Assessed  Mouth/Opening: Not Assessed  TM distance: Not Assessed  Neck ROM: Not Assessed   Respiratory:    CV:    Comments: Virtual visit     Dental: Normal Dentition                Assessment:    ASA SCORE: 2    H&P: History and physical reviewed and following examination; no interval change.    NPO Status: NPO Appropriate     Plan:   Anes. Type:  MAC   Pre-Medication: None   Induction:  N/a   Airway: Native Airway   Access/Monitoring: PIV   Maintenance: N/a     Postop Plan:   Postop Pain: None  Postop Sedation/Airway: Not planned     CONSENT: Direct conversation   Plan and risks discussed with: Patient                   PAC Discussion and Assessment    ASA Classification: 3  Case is suitable for: ASC  Anesthetic techniques and relevant risks discussed: MAC with GA as backup  Invasive monitoring and risk discussed: No  Types:   Possibility and Risk of blood transfusion discussed: No  NPO instructions given:   Additional anesthetic preparation and risks discussed:   Needs early admission to pre-op area:   Other:     PAC Resident/NP Anesthesia Assessment:  Speedy Carlson is a 59 year old male scheduled to undergo  DRUG-INDUCED SLEEP ENDOSCOPY with Dr. Marroquin on 6/3/20. He has the following specific operative considerations:   - RCRI : No serious cardiac risks.  - VTE risk: 0.5%  - Risk of PONV score = 2.  If > 2, anti-emetic intervention recommended.    --HANNAH with inability to tolerate CPAP. Above procedure now planned for further evaluation with MAC. Patient comfortable with plan.  --Mixed hyperlipidemia. Will take pravastatin and fenofibrate on DOS. HYPERTENSION. Will take Losartan on DOS. No other cardiac history or symptoms. ASA 81 mg daily and will take on DOS. Coronary calcium score 22 in 2016. Good exercise tolerance.  --Nonsmoker. Denies pulmonary symptoms. Childhood asthma. No use of inhaler.   --Following with Weight Loss Management team. Will take Contrave on DOS.   --Insomnia. Trazodone at HS.      Patient was reviewed by Dr Valadez.      Reviewed and Signed by PAC Mid-Level Provider/Resident  Mid-Level Provider/Resident: RORO Atkinson, TANI  Date: 5/21/20  Time: 8:52am    Attending  Anesthesiologist Anesthesia Assessment:        Anesthesiologist:   Date:   Time:   Pass/Fail:   Disposition:     PAC Pharmacist Assessment:        Pharmacist:   Date:   Time:    RORO Suresh CNS     ANESTHESIA PREOP EVALUATION    PROCEDURE: Procedure(s):  DRUG-INDUCED SLEEP ENDOSCOPY    HPI: Speeyd Carlson is a 59 year old male who presents for above procedure 2/2 HANNAH.    PAST MEDICAL HISTORY:    Past Medical History:   Diagnosis Date     Acute prostatitis 12/04     Calculus of kidney 1994    no recurrence     Mild intermittent asthma     hx in childhood     Mixed hyperlipidemia      Obesity      HANNAH (obstructive sleep apnea)      Other acne      Varicella without mention of complication        PAST SURGICAL HISTORY:    Past Surgical History:   Procedure Laterality Date     COLONOSCOPY  8 years ago     HC REMOVAL OF TONSILS,<11 Y/O      s/p Tonsillectomy     HC TOOTH EXTRACTION W/FORCEP      wisdom teeth     PHOTOREFRACTIVE KERATECTOMY      s/p Lasik surgery       SOCIAL HISTORY:       Social History     Tobacco Use     Smoking status: Never Smoker     Smokeless tobacco: Never Used     Tobacco comment: cigar occ   Substance Use Topics     Alcohol use: Yes     Comment: 2-4 beers per week       ALLERGIES:     Allergies   Allergen Reactions     Azithromycin Rash     Skin peeling       MEDICATIONS:     (Not in a hospital admission)      Current Outpatient Medications   Medication Sig Dispense Refill     albuterol (PROAIR HFA/PROVENTIL HFA/VENTOLIN HFA) 108 (90 Base) MCG/ACT inhaler Inhale 2 puffs into the lungs every 6 hours as needed for shortness of breath / dyspnea or wheezing       aspirin 81 MG tablet Take 81 mg by mouth daily       augmented betamethasone dipropionate (DIPROLENE) 0.05 % external gel Apply topically 2 times daily as needed       fenofibrate 145 MG tablet Take 145 mg by mouth daily        losartan (COZAAR) 50 MG tablet Take 50 mg by mouth daily        naltrexone-bupropion (CONTRAVE) 8-90  MG per 12 hr tablet Week 1: 1 tablet daily in AM; Week 2: 1 tablet in AM & PM, Week 3: 2 tablets in AM & 1 tablet in PM, Week 4 & on: 2 tablets in AM & PM. (Patient taking differently: Take 2 tablets by mouth 2 times daily ) 120 tablet 3     pravastatin (PRAVACHOL) 40 MG tablet Take 40 mg by mouth every morning        traZODone (DESYREL) 50 MG tablet Take 150 mg by mouth At Bedtime          Current Outpatient Medications Ordered in Epic   Medication Sig Dispense Refill     albuterol (PROAIR HFA/PROVENTIL HFA/VENTOLIN HFA) 108 (90 Base) MCG/ACT inhaler Inhale 2 puffs into the lungs every 6 hours as needed for shortness of breath / dyspnea or wheezing       aspirin 81 MG tablet Take 81 mg by mouth daily       augmented betamethasone dipropionate (DIPROLENE) 0.05 % external gel Apply topically 2 times daily as needed       fenofibrate 145 MG tablet Take 145 mg by mouth daily        losartan (COZAAR) 50 MG tablet Take 50 mg by mouth daily        naltrexone-bupropion (CONTRAVE) 8-90 MG per 12 hr tablet Week 1: 1 tablet daily in AM; Week 2: 1 tablet in AM & PM, Week 3: 2 tablets in AM & 1 tablet in PM, Week 4 & on: 2 tablets in AM & PM. (Patient taking differently: Take 2 tablets by mouth 2 times daily ) 120 tablet 3     pravastatin (PRAVACHOL) 40 MG tablet Take 40 mg by mouth every morning        traZODone (DESYREL) 50 MG tablet Take 150 mg by mouth At Bedtime        No current Epic-ordered facility-administered medications on file.        PHYSICAL EXAM:    Vitals: T Data Unavailable, P Data Unavailable, BP Data Unavailable, R Data Unavailable, SpO2  , Weight   Wt Readings from Last 2 Encounters:   05/21/20 101.6 kg (224 lb)   03/12/20 106.1 kg (234 lb)       See doc flowsheet    NPO STATUS: see doc flowsheet    LABS:    BMP:  Recent Labs   Lab Test 08/12/14  1445      POTASSIUM 4.2   CHLORIDE 104   CO2 26   BUN 14   CR 1.17   GLC 97   RAVI 9.1       LFTs:   Recent Labs   Lab Test 08/12/14  1445   PROTTOTAL 7.5    ALBUMIN 4.1   BILITOTAL 0.4   ALKPHOS 72   AST 27   ALT 48       CBC:   Recent Labs   Lab Test 08/12/14  1445   WBC 7.3   RBC 5.14   HGB 14.6   HCT 43.4   MCV 84   MCH 28.4   MCHC 33.6   RDW 11.8          Coags:  No results for input(s): INR, PTT, FIBR in the last [id number] hours.    Imaging:  No orders to display       Rylan Heard MD  Anesthesiology Staff  Pager [phone]    6/2/2020  5:26 PM

## 2020-05-31 DIAGNOSIS — Z11.59 ENCOUNTER FOR SCREENING FOR OTHER VIRAL DISEASES: ICD-10-CM

## 2020-05-31 PROCEDURE — 87635 SARS-COV-2 COVID-19 AMP PRB: CPT | Mod: 90 | Performed by: OTOLARYNGOLOGY

## 2020-05-31 PROCEDURE — 99000 SPECIMEN HANDLING OFFICE-LAB: CPT | Performed by: OTOLARYNGOLOGY

## 2020-06-01 LAB
SARS-COV-2 RNA SPEC QL NAA+PROBE: NOT DETECTED
SPECIMEN SOURCE: NORMAL

## 2020-06-03 ENCOUNTER — HOSPITAL ENCOUNTER (OUTPATIENT)
Facility: AMBULATORY SURGERY CENTER | Age: 60
End: 2020-06-03
Attending: OTOLARYNGOLOGY
Payer: COMMERCIAL

## 2020-06-03 ENCOUNTER — ANESTHESIA (OUTPATIENT)
Dept: SURGERY | Facility: AMBULATORY SURGERY CENTER | Age: 60
End: 2020-06-03

## 2020-06-03 VITALS
RESPIRATION RATE: 14 BRPM | OXYGEN SATURATION: 97 % | TEMPERATURE: 97.4 F | HEART RATE: 85 BPM | SYSTOLIC BLOOD PRESSURE: 105 MMHG | DIASTOLIC BLOOD PRESSURE: 69 MMHG

## 2020-06-03 RX ORDER — OXYMETAZOLINE HYDROCHLORIDE 0.05 G/100ML
SPRAY NASAL PRN
Status: DISCONTINUED | OUTPATIENT
Start: 2020-06-03 | End: 2020-06-03 | Stop reason: HOSPADM

## 2020-06-03 RX ORDER — ONDANSETRON 4 MG/1
4 TABLET, ORALLY DISINTEGRATING ORAL EVERY 30 MIN PRN
Status: DISCONTINUED | OUTPATIENT
Start: 2020-06-03 | End: 2020-06-04 | Stop reason: HOSPADM

## 2020-06-03 RX ORDER — NALOXONE HYDROCHLORIDE 0.4 MG/ML
.1-.4 INJECTION, SOLUTION INTRAMUSCULAR; INTRAVENOUS; SUBCUTANEOUS
Status: DISCONTINUED | OUTPATIENT
Start: 2020-06-03 | End: 2020-06-04 | Stop reason: HOSPADM

## 2020-06-03 RX ORDER — PROPOFOL 10 MG/ML
INJECTION, EMULSION INTRAVENOUS PRN
Status: DISCONTINUED | OUTPATIENT
Start: 2020-06-03 | End: 2020-06-03

## 2020-06-03 RX ORDER — PROPOFOL 10 MG/ML
INJECTION, EMULSION INTRAVENOUS CONTINUOUS PRN
Status: DISCONTINUED | OUTPATIENT
Start: 2020-06-03 | End: 2020-06-03

## 2020-06-03 RX ORDER — MEPERIDINE HYDROCHLORIDE 25 MG/ML
12.5 INJECTION INTRAMUSCULAR; INTRAVENOUS; SUBCUTANEOUS
Status: DISCONTINUED | OUTPATIENT
Start: 2020-06-03 | End: 2020-06-04 | Stop reason: HOSPADM

## 2020-06-03 RX ORDER — SODIUM CHLORIDE, SODIUM LACTATE, POTASSIUM CHLORIDE, CALCIUM CHLORIDE 600; 310; 30; 20 MG/100ML; MG/100ML; MG/100ML; MG/100ML
INJECTION, SOLUTION INTRAVENOUS CONTINUOUS
Status: DISCONTINUED | OUTPATIENT
Start: 2020-06-03 | End: 2020-06-03 | Stop reason: HOSPADM

## 2020-06-03 RX ORDER — LIDOCAINE 40 MG/G
CREAM TOPICAL
Status: DISCONTINUED | OUTPATIENT
Start: 2020-06-03 | End: 2020-06-03 | Stop reason: HOSPADM

## 2020-06-03 RX ORDER — ACETAMINOPHEN 325 MG/1
975 TABLET ORAL ONCE
Status: COMPLETED | OUTPATIENT
Start: 2020-06-03 | End: 2020-06-03

## 2020-06-03 RX ORDER — OXYCODONE HYDROCHLORIDE 5 MG/1
5 TABLET ORAL EVERY 4 HOURS PRN
Status: DISCONTINUED | OUTPATIENT
Start: 2020-06-03 | End: 2020-06-04 | Stop reason: HOSPADM

## 2020-06-03 RX ORDER — ONDANSETRON 2 MG/ML
4 INJECTION INTRAMUSCULAR; INTRAVENOUS EVERY 30 MIN PRN
Status: DISCONTINUED | OUTPATIENT
Start: 2020-06-03 | End: 2020-06-04 | Stop reason: HOSPADM

## 2020-06-03 RX ORDER — SODIUM CHLORIDE, SODIUM LACTATE, POTASSIUM CHLORIDE, CALCIUM CHLORIDE 600; 310; 30; 20 MG/100ML; MG/100ML; MG/100ML; MG/100ML
INJECTION, SOLUTION INTRAVENOUS CONTINUOUS
Status: DISCONTINUED | OUTPATIENT
Start: 2020-06-03 | End: 2020-06-04 | Stop reason: HOSPADM

## 2020-06-03 RX ORDER — FENTANYL CITRATE 50 UG/ML
25-50 INJECTION, SOLUTION INTRAMUSCULAR; INTRAVENOUS
Status: DISCONTINUED | OUTPATIENT
Start: 2020-06-03 | End: 2020-06-03 | Stop reason: HOSPADM

## 2020-06-03 RX ADMIN — PROPOFOL 50 MCG/KG/MIN: 10 INJECTION, EMULSION INTRAVENOUS at 08:02

## 2020-06-03 RX ADMIN — PROPOFOL 20 MG: 10 INJECTION, EMULSION INTRAVENOUS at 08:02

## 2020-06-03 RX ADMIN — SODIUM CHLORIDE, SODIUM LACTATE, POTASSIUM CHLORIDE, CALCIUM CHLORIDE: 600; 310; 30; 20 INJECTION, SOLUTION INTRAVENOUS at 06:54

## 2020-06-03 RX ADMIN — ACETAMINOPHEN 975 MG: 325 TABLET ORAL at 06:54

## 2020-06-03 NOTE — ANESTHESIA CARE TRANSFER NOTE
Patient: Speedy Carlson    Procedure(s):  DRUG-INDUCED SLEEP ENDOSCOPY    Diagnosis: HANANH (obstructive sleep apnea) [G47.33]  Diagnosis Additional Information: No value filed.    Anesthesia Type:   MAC     Note:  Airway :Room Air  Patient transferred to:Phase II  Comments: Uneventful transport - Phase 2 - room air - cloth face mask  Report to RN - Kika  Exchanging well; color natl  Pt responds appropriately to command  IV patent  Lips/teeth/dentition as preop status  Questions answered  /66  HR 81  TAT 97.5  RR 16  Sat 98% room air  Handoff Report: Identifed the Patient, Identified the Reponsible Provider, Reviewed the pertinent medical history, Discussed the surgical course, Reviewed Intra-OP anesthesia mangement and issues during anesthesia, Set expectations for post-procedure period and Allowed opportunity for questions and acknowledgement of understanding      Vitals: (Last set prior to Anesthesia Care Transfer)    CRNA VITALS  6/3/2020 0750 - 6/3/2020 0824      6/3/2020             Pulse:  78    Ht Rate:  77    SpO2:  98 %    Resp Rate (set):  10                Electronically Signed By: RORO LIGHT CRNA  Liliana 3, 2020  8:24 AM

## 2020-06-03 NOTE — ANESTHESIA POSTPROCEDURE EVALUATION
Anesthesia POST Procedure Evaluation    Patient: Speedy Carlson   MRN:     [id number] Gender:   male   Age:    59 year old :      1960        Preoperative Diagnosis: HANNAH (obstructive sleep apnea) [G47.33]   Procedure(s):  DRUG-INDUCED SLEEP ENDOSCOPY   Postop Comments: No value filed.     Anesthesia Type: MAC       Disposition: Outpatient   Postop Pain Control: Uneventful            Sign Out: Well controlled pain   PONV: No   Neuro/Psych: Uneventful            Sign Out: Acceptable/Baseline neuro status   Airway/Respiratory: Uneventful            Sign Out: Acceptable/Baseline resp. status   CV/Hemodynamics: Uneventful            Sign Out: Acceptable CV status   Other NRE: NONE   DID A NON-ROUTINE EVENT OCCUR? No         Last Anesthesia Record Vitals:  CRNA VITALS  6/3/2020 0750 - 6/3/2020 0850      6/3/2020             Pulse:  78    Ht Rate:  77    SpO2:  98 %    Resp Rate (set):  10          Last PACU Vitals:  Vitals Value Taken Time   BP     Temp     Pulse     Resp     SpO2     Temp src Available 6/3/2020  8:15 AM   NIBP 108/67 6/3/2020  8:16 AM   Pulse 78 6/3/2020  8:20 AM   SpO2 98 % 6/3/2020  8:20 AM   Resp     Temp     Ht Rate 77 6/3/2020  8:20 AM   Temp 2           Electronically Signed By: Rylan Heard MD, Liliana 3, 2020, 12:52 PM

## 2020-06-03 NOTE — DISCHARGE INSTRUCTIONS
[facility]  Home Care Following Anesthesia  For 24 hours after surgery:  1. Get plenty of rest.  A responsible adult must stay with you for at least 24 hours after you leave the surgery center.  2. Do not drive or use heavy equipment.  If you have weakness or tingling, don't drive or use heavy equipment until this feeling goes away.   3. Do not drink alcohol.   4. Avoid strenuous or risky activities.  Ask for help when climbing stairs.  5. You may feel lightheaded.  IF so, sit for a few minutes before standing.  Have someone help you get up.   6. If you have nausea (feel sick to your stomach): Drink only clear liquids such as apple juice, ginger ale, broth or 7-Up.  Rest may also help.  Be sure to drink enough fluids.  Move to a regular diet as you feel able.   7. You may have a slight fever.  Call the doctor if your fever is over 100 F (37.7 C) (taken under the tongue) or lasts longer than 24 hours.  8. You may have a dry mouth, a sore throat, muscle aches or trouble sleeping. These should go away after 24 hours.  9. Do not make important or legal decisions.       Tylenol/Acetaminophen Consumption  To help encourage the safe use of acetaminophen, the makers of TYLENOL  have lowered the maximum daily dose for single-ingredient Extra Strength TYLENOL  (acetaminophen) products sold in the [place] from 8 pills per day (4,000 mg) to 6 pills per day (3,000 mg). The dosing interval has also changed from 2 pills every 4-6 hours to 2 pills every 6 hours.    If you feel your pain relief is insufficient, you may take Tylenol/Acetaminophen in addition to your narcotic pain medication.     Be careful not to exceed 3,000 mg of Tylenol/Acetaminophen in a 24 hour period from all sources.    If you are taking extra strength Tylenol/acetaminophen (500 mg), the maximum dose is 6 tablets in 24 hours.    If you are taking regular strength acetaminophen (325 mg), the maximum dose is 9 tablets in 24  hours.    Call a doctor for any of the followin. Signs of infection (fever, growing tenderness at the surgery site, a large amount of drainage or bleeding, severe pain, foul-smelling drainage, redness, swelling).  2. It has been over 8 to 10 hours since surgery and you are still not able to urinate (pass water).  3. Headache for over 24 hours.  4. Numbness, tingling or weakness the day after surgery (if you had spinal anesthesia).  5. Signs of Covid-19 infection (temperature over 100 degrees, shortness of breath, cough, loss of taste/smell, generalized body aches, persistent headache, chills, sore throat, nausea/vomiting/diarrhea)  Your doctor is:  Dr. Gladys Marroquin, ENT Otolaryngology: [phone]                    Or dial [phone] and ask for the resident on call for:  ENT Otolaryngology  For emergency care, call the:  [facility]:  [phone] (TTY for hearing impaired: [phone])

## 2020-06-04 ENCOUNTER — TELEPHONE (OUTPATIENT)
Dept: OTOLARYNGOLOGY | Facility: CLINIC | Age: 60
End: 2020-06-04

## 2020-06-04 NOTE — TELEPHONE ENCOUNTER
LVM letting pt know due to COVID-19 protocol Dr. Marroquin is limited in patients that she can see in clinic at this time. Offered return video visit via [employer] touchpoint for appt on 6/11 with provider.     Left call center number for conversion/rescheduling.       If pt would like to complete video visit via [employer] please confirm pt e-mail. Please send pt [employer] tip sheet via [employer]. Pt is welcome to do telephone appt if unable to facilitate a video appt.

## 2020-06-08 NOTE — OP NOTE
PREOPERATIVE DIAGNOSIS:   obstructive sleep apnea.        POSTOPERATIVE DIAGNOSIS:   obstructive sleep apnea.        PROCEDURE:  Drug-induced sleep endoscopy.        SURGEON:  Gladys Marroquin MD        ANESTHESIA:  Monitored anesthesia care.        SPECIMENS REMOVED:  None.        COMPLICATIONS:  None.        INDICATIONS:  Patient is a 59 year old male with  obstructive sleep apnea, who has difficulty tolerating CPAP therapy.        FINDINGS:  V: % Anterior posterior collapse           O: 50% lateral narrowing           T : 50-75% AP collapse            E: passive    Improvement with jaw thrust at tongue base and velum          DESCRIPTION OF PROCEDURE:  The patient was brought into the operating room, placed on the operating table in supine position.  A member of the Department of Anesthesia was present and supplied the monitored anesthesia care.  A timeout was taken to correctly identify the patient and the procedure.  Once the patient reached an appropriate level of sedation, an endoscope was introduced into the patient's left nostril.  The upper airway was observed.  Findings are as above.  The scope was removed.  The patient was handed back over to Anesthesia and transferred to the PACU in stable condition.

## 2020-06-11 ENCOUNTER — VIRTUAL VISIT (OUTPATIENT)
Dept: OTOLARYNGOLOGY | Facility: CLINIC | Age: 60
End: 2020-06-11
Payer: COMMERCIAL

## 2020-06-11 VITALS — HEIGHT: 72 IN | WEIGHT: 223 LBS | BODY MASS INDEX: 30.2 KG/M2

## 2020-06-11 DIAGNOSIS — G47.33 OSA (OBSTRUCTIVE SLEEP APNEA): Primary | ICD-10-CM

## 2020-06-11 RX ORDER — CEFAZOLIN SODIUM 2 G/50ML
2 SOLUTION INTRAVENOUS
Status: CANCELLED | OUTPATIENT
Start: 2020-06-11

## 2020-06-11 RX ORDER — CEFAZOLIN SODIUM 1 G/50ML
1 INJECTION, SOLUTION INTRAVENOUS SEE ADMIN INSTRUCTIONS
Status: CANCELLED | OUTPATIENT
Start: 2020-06-11

## 2020-06-11 ASSESSMENT — PAIN SCALES - GENERAL: PAINLEVEL: NO PAIN (0)

## 2020-06-11 ASSESSMENT — MIFFLIN-ST. JEOR: SCORE: 1869.52

## 2020-06-11 NOTE — PROGRESS NOTES
"Speedy Carlson is a 59 year old male who is being evaluated via a billable telephone visit.      The patient has been notified of following:     \"This telephone visit will be conducted via a call between you and your physician/provider. We have found that certain health care needs can be provided without the need for a physical exam.  This service lets us provide the care you need with a short phone conversation.  If a prescription is necessary we can send it directly to your pharmacy.  If lab work is needed we can place an order for that and you can then stop by our lab to have the test done at a later time.    Telephone visits are billed at different rates depending on your insurance coverage. During this emergency period, for some insurers they may be billed the same as an in-person visit.  Please reach out to your insurance provider with any questions.    If during the course of the call the physician/provider feels a telephone visit is not appropriate, you will not be charged for this service.\"    Patient has given verbal consent for Telephone visit?  Yes    What phone number would you like to be contacted at? [phone]    How would you like to obtain your AVS? Mail a copy    Phone call duration: 15 minutes    Gladys Marroquin MD    CC: HANNAH    HPI: Patient returns via telephone visit for follow-up after a drug-induced LEEP endoscopy.  Sleep endoscopy showed anterior posterior collapse at the level of the soft palate as well as tongue base.    A/P:  Patient has a history of severe obstructive sleep apnea with intolerance to positive airway pressure therapy.  He meets criteria for hypoglossal nerve stimulation therapy/upper airway stimulation therapy.  His BMI currently is 30.  I discussed with the patient what is involved in the procedure as well as expected recovery.  We discussed potential complications including nerve damage, pneumothorax, failure to improve sleep apnea.  I will need to set the patient up " for a sleep medicine provider since he is self-referred.  Patient would prefer [facility] at this time.  We will also need to start the prior authorization process for hypoglossal nerve stimulation therapy.  Patient has done a lot of research including speaking to patients who have been advised as well as doing his own research online.  He is ready to proceed.

## 2020-06-11 NOTE — LETTER
"6/11/2020       RE: Speedy Carlson  [address] [facility] [place] [place] [address]     Dear Colleague,    Thank you for referring your patient, Speedy Carlson, to the [facility] at [facility]. Please see a copy of my visit note below.    Speedy Carlson is a 59 year old male who is being evaluated via a billable telephone visit.      The patient has been notified of following:     \"This telephone visit will be conducted via a call between you and your physician/provider. We have found that certain health care needs can be provided without the need for a physical exam.  This service lets us provide the care you need with a short phone conversation.  If a prescription is necessary we can send it directly to your pharmacy.  If lab work is needed we can place an order for that and you can then stop by our lab to have the test done at a later time.    Telephone visits are billed at different rates depending on your insurance coverage. During this emergency period, for some insurers they may be billed the same as an in-person visit.  Please reach out to your insurance provider with any questions.    If during the course of the call the physician/provider feels a telephone visit is not appropriate, you will not be charged for this service.\"    Patient has given verbal consent for Telephone visit?  Yes    What phone number would you like to be contacted at? [phone]    How would you like to obtain your AVS? Mail a copy    Phone call duration: 15 minutes    Gladys Marroquin MD    CC: HANNAH    HPI: Patient returns via telephone visit for follow-up after a drug-induced LEEP endoscopy.  Sleep endoscopy showed anterior posterior collapse at the level of the soft palate as well as tongue base.    A/P:  Patient has a history of severe obstructive sleep apnea with intolerance to positive airway pressure therapy.  He meets criteria for hypoglossal nerve stimulation " therapy/upper airway stimulation therapy.  His BMI currently is 30.  I discussed with the patient what is involved in the procedure as well as expected recovery.  We discussed potential complications including nerve damage, pneumothorax, failure to improve sleep apnea.  I will need to set the patient up for a sleep medicine provider since he is self-referred.  Patient would prefer [facility] at this time.  We will also need to start the prior authorization process for hypoglossal nerve stimulation therapy.  Patient has done a lot of research including speaking to patients who have been advised as well as doing his own research online.  He is ready to proceed.      Again, thank you for allowing me to participate in the care of your patient.      Sincerely,    Gladys Marroquin MD

## 2020-06-12 ENCOUNTER — DOCUMENTATION ONLY (OUTPATIENT)
Dept: OTOLARYNGOLOGY | Facility: CLINIC | Age: 60
End: 2020-06-12

## 2020-06-12 NOTE — PROGRESS NOTES
Patient intake form and pertinent records faxed to [employer] prior authorization team.     Zuri Burgos RN

## 2020-07-06 VITALS — WEIGHT: 219 LBS | BODY MASS INDEX: 29.66 KG/M2 | HEIGHT: 72 IN

## 2020-07-06 PROBLEM — G47.33 OSA (OBSTRUCTIVE SLEEP APNEA): Chronic | Status: ACTIVE | Noted: 2020-02-26

## 2020-07-06 PROBLEM — E66.811 CLASS 1 OBESITY DUE TO EXCESS CALORIES WITHOUT SERIOUS COMORBIDITY WITH BODY MASS INDEX (BMI) OF 31.0 TO 31.9 IN ADULT: Chronic | Status: ACTIVE | Noted: 2020-07-06

## 2020-07-06 PROBLEM — E66.09 CLASS 1 OBESITY DUE TO EXCESS CALORIES WITHOUT SERIOUS COMORBIDITY WITH BODY MASS INDEX (BMI) OF 31.0 TO 31.9 IN ADULT: Chronic | Status: ACTIVE | Noted: 2020-07-06

## 2020-07-06 ASSESSMENT — MIFFLIN-ST. JEOR: SCORE: 1851.38

## 2020-07-06 NOTE — PATIENT INSTRUCTIONS
Your BMI is Body mass index is 29.7 kg/m .  Weight management is a personal decision.  If you are interested in exploring weight loss strategies, the following discussion covers the approaches that may be successful. Body mass index (BMI) is one way to tell whether you are at a healthy weight, overweight, or obese. It measures your weight in relation to your height.  A BMI of 18.5 to 24.9 is in the healthy range. A person with a BMI of 25 to 29.9 is considered overweight, and someone with a BMI of 30 or greater is considered obese. More than two-thirds of American adults are considered overweight or obese.  Being overweight or obese increases the risk for further weight gain. Excess weight may lead to heart disease and diabetes.  Creating and following plans for healthy eating and physical activity may help you improve your health.  Weight control is part of healthy lifestyle and includes exercise, emotional health, and healthy eating habits. Careful eating habits lifelong are the mainstay of weight control. Though there are significant health benefits from weight loss, long-term weight loss with diet alone may be very difficult to achieve- studies show long-term success with dietary management in less than 10% of people. Attaining a healthy weight may be especially difficult to achieve in those with severe obesity. In some cases, medications, devices and surgical management might be considered.  What can you do?  If you are overweight or obese and are interested in methods for weight loss, you should discuss this with your provider.     Consider reducing daily calorie intake by 500 calories.     Keep a food journal.     Avoiding skipping meals, consider cutting portions instead.    Diet combined with exercise helps maintain muscle while optimizing fat loss. Strength training is particularly important for building and maintaining muscle mass. Exercise helps reduce stress, increase energy, and improves fitness.  Increasing exercise without diet control, however, may not burn enough calories to loose weight.       Start walking three days a week 10-20 minutes at a time    Work towards walking thirty minutes five days a week     Eventually, increase the speed of your walking for 1-2 minutes at time    In addition, we recommend that you review healthy lifestyles and methods for weight loss available through the National Institutes of Health patient information sites:  http://win.niddk.nih.gov/publications/index.htm    And look into health and wellness programs that may be available through your health insurance provider, employer, local community center, or autumn club.    Weight management plan: Patient was referred to their PCP to discuss a diet and exercise plan.

## 2020-07-06 NOTE — PROGRESS NOTES
"Speedy Carlson is a 59 year old male who is being evaluated via a billable video visit.      The patient has been notified of following:     \"This video visit will be conducted via a call between you and your physician/provider. We have found that certain health care needs can be provided without the need for an in-person physical exam.  This service lets us provide the care you need with a video conversation.  If a prescription is necessary we can send it directly to your pharmacy.  If lab work is needed we can place an order for that and you can then stop by our lab to have the test done at a later time.    Video visits are billed at different rates depending on your insurance coverage.  Please reach out to your insurance provider with any questions.    If during the course of the call the physician/provider feels a video visit is not appropriate, you will not be charged for this service.\"    Patient has given verbal consent for Video visit? Yes  How would you like to obtain your AVS? MyChart    Will anyone else be joining your video visit? No      Trey Combs MD on 7/6/2020 at 11:13 AM    Video-Visit Details    Type of service:  Video Visit    Video Start Time: 11:01 AM  Video End Time: 11:42 AM    Originating Location (pt. Location): Home    Distant Location (provider location):  [place]    Platform used for Video Visit: Well            Sleep Consultation:    Date on this visit: 7/7/2020    Speedy Carlson is sent by Gladys Marroquin for a sleep consultation regarding obstructive sleep apnea .    Primary Physician: No Ref-Primary, Physician     Chief Complaint   Patient presents with     Sleep Problem     Severe apnea, wants to discuss inspire. Stopped cpap 6 + months ago.     Sent by Dr. Marroquin as he is considering hypoglossal nerve stimulator therapy.     He initially presented with snoring, irregular breathing and witnessed apneas, 'mildl' daytime/evening sleepiness (ESS 3), comorbid hypertension, and " insomnia managed with trazodone.     [facility]: 04/16/2019 (239#) AHI 52, RDI/DEMETRIUS of 52 per hour. The low O2 sat is 72%. This is associated with continuous loud snoring and spiking desaturation and what might be a REM pattern.    He was started on CPAP. He purchased a machine on line, he tried several masks. It would come off at night.     It did not haven follow-up    Sleep endoscopy 6/3/20 showed anterior posterior collapse at the level of the soft palate as well as tongue base.        Speedy goes to bed at 11:00 PM during the week. He gets up at 6:30 AM without an alarm.  Speedy has difficulty falling asleep. He takes trazadone. He falls asleep in <30 minutes. He has had problems for 15-20 years. He denies worrying, he feels staying up late watching TV and using computer may contribute. He denies difficulty turning his brain off or discomfort. He wakes up 4-6 times a night and denies falling back to sleep.  Speedy wakes up to go to the bathroom and uncertain reasons.  On weekends, schedule is similar.      Patient does not use electronics in bed and watch TV in bed.      Patient does have a regular bed partner. Patient sleeps on his side and stomach. He denies no morning headaches and restless legs.     Speedy denies any sleep walking, sleep talking and dream enactment.     Speedy denies reflux at night.      Speedy has lost >20 pounds since sleep study. Patient's [place] Sleepiness score 16/24 consistent with excessive  daytime sleepiness.      Speedy naps rarely. He takes some inadvertant naps.  He denies dozing while driving. He uses <1 cups/day of coffee, 0-1 sodas/day. Last caffeine intake is usually before 1 pm.    Recent Labs   Lab Test 08/12/14  1445      POTASSIUM 4.2   CHLORIDE 104   CO2 26   ANIONGAP 7   GLC 97   BUN 14   CR 1.17   RAVI 9.1       Allergies:    Allergies   Allergen Reactions     Azithromycin Rash     Skin peeling       Medications:    Current Outpatient  Medications   Medication Sig Dispense Refill     albuterol (PROAIR HFA/PROVENTIL HFA/VENTOLIN HFA) 108 (90 Base) MCG/ACT inhaler Inhale 2 puffs into the lungs every 6 hours as needed for shortness of breath / dyspnea or wheezing       aspirin 81 MG tablet Take 81 mg by mouth daily       augmented betamethasone dipropionate (DIPROLENE) 0.05 % external gel Apply topically 2 times daily as needed       fenofibrate 145 MG tablet Take 145 mg by mouth daily        losartan (COZAAR) 50 MG tablet Take 50 mg by mouth daily        naltrexone-bupropion (CONTRAVE) 8-90 MG per 12 hr tablet Week 1: 1 tablet daily in AM; Week 2: 1 tablet in AM & PM, Week 3: 2 tablets in AM & 1 tablet in PM, Week 4 & on: 2 tablets in AM & PM. (Patient taking differently: Take 2 tablets by mouth 2 times daily ) 120 tablet 3     pravastatin (PRAVACHOL) 40 MG tablet Take 40 mg by mouth every morning        traZODone (DESYREL) 50 MG tablet Take 150 mg by mouth At Bedtime          Problem List:  Patient Active Problem List    Diagnosis Date Noted     HANNAH (obstructive sleep apnea)- severe (AHI 52) 02/26/2020     Priority: Medium     Home Sleep Study: 04/16/2019 AHI 52, RDI/DEMETRIUS of 52 per hour. The low O2 sat is 72%. This is associated with continuous loud snoring and spiking desaturation and what might be a REM pattern.       Hypertension 02/09/2015     Priority: Medium     Hyperlipidemia LDL goal <130 10/31/2010     Priority: Medium     Class 1 obesity due to excess calories without serious comorbidity with body mass index (BMI) of 31.0 to 31.9 in adult 07/06/2020     Priority: Low     Hypovitaminosis D 01/24/2019     Priority: Low     Insomnia 01/24/2019     Priority: Low     Tinnitus 01/24/2019     Priority: Low     Family history of early CAD 04/26/2016     Priority: Low     ED (erectile dysfunction) 02/09/2015     Priority: Low     Advance directive on file 08/20/2014     Priority: Low     See scanned form - dated 5/7/2008       Seborrhea capitis  05/31/2011     Priority: Low     Tear film insufficiency 03/06/2006     Priority: Low     Other acne 01/28/2004     Priority: Low        Past Medical/Surgical History:  Past Medical History:   Diagnosis Date     Acute prostatitis 12/2004     Calculus of kidney 1994    no recurrence     Cervical radiculopathy 10/4/2016    R side     Mild intermittent asthma 1975    hx in childhood     Mixed hyperlipidemia      Obesity      HANNAH (obstructive sleep apnea)      Other acne      Varicella without mention of complication      Past Surgical History:   Procedure Laterality Date     COLONOSCOPY  2012     DENTAL SURGERY  1980    wisdom teeth     ENDOSCOPY DRUG INDUCED SLEEP N/A 6/3/2020    Procedure: DRUG-INDUCED SLEEP ENDOSCOPY;  Surgeon: Gladys Marroquin MD;  Location: [facility] OR     PHOTOREFRACTIVE KERATECTOMY  2000    s/p Lasik surgery     TONSILLECTOMY  1971    s/p Tonsillectomy       Social History:  Social History     Socioeconomic History     Marital status:      Spouse name: Acosta Zuleta     Number of children: 0     Years of education: 18     Highest education level: Not on file   Occupational History     Occupation: Research Analyst     Employer: [employer]     Occupation: Consultant     Comment: self-employed   Social Needs     Financial resource strain: Not on file     Food insecurity     Worry: Not on file     Inability: Not on file     Transportation needs     Medical: Not on file     Non-medical: Not on file   Tobacco Use     Smoking status: Never Smoker     Smokeless tobacco: Never Used     Tobacco comment: cigar occ   Substance and Sexual Activity     Alcohol use: Yes     Frequency: 2-4 times a month     Comment: 2-4 beers per week     Drug use: No     Sexual activity: Yes     Partners: Female     Birth control/protection: Pill   Lifestyle     Physical activity     Days per week: Not on file     Minutes per session: Not on file     Stress: Not on file   Relationships     Social  connections     Talks on phone: Not on file     Gets together: Not on file     Attends [employer]ism service: Not on file     Active member of club or organization: Not on file     Attends meetings of clubs or organizations: Not on file     Relationship status: Not on file     Intimate partner violence     Fear of current or ex partner: Not on file     Emotionally abused: Not on file     Physically abused: Not on file     Forced sexual activity: Not on file   Other Topics Concern     Not on file   Social History Narrative    Social Documentation:        Balanced Diet: YES    Calcium intake: 1-2 per day    Caffeine: 1 cups coffee per day-during week    Exercise:  type of activity walk;  3 days / week  exercycle, treadmill    Sunscreen: Yes    Seatbelts:  Yes    Self Testicular Exam: Yes    Physical/Emotional/Sexual Abuse: No     Do you feel safe in your environment? Yes        Cholesterol screen up to date: No-Discussed    Eye Exam up to date: Yes    Dental Exam up to date: Yes    Dexa Scan up to date: Does Not Apply    Colonoscopy up to date: Does Not Apply    Immunizations up to date: Yes     Glucose screen if over 40:  No- Discussed        Brittany Canada MA       Family History:  Family History   Problem Relation Age of Onset     Cancer Father         lung     Breast Cancer Mother         at age  45     Depression Mother      Lipids Mother      Obesity Mother      Eye Disorder Mother      C.A.D. Brother         MI at age 40     Lipids Brother        Review of Systems:  A complete review of systems reviewed by me is negative with the exeption of what has been mentioned in the history of present illness.  CONSTITUTIONAL: NEGATIVE for weight gain/loss, fever, chills, sweats or night sweats, drug allergies.  EYES: NEGATIVE for changes in vision, blind spots, double vision.  ENT: NEGATIVE for ear pain, sore throat, sinus pain, post-nasal drip, runny nose, bloody nose  CARDIAC: NEGATIVE for fast heartbeats or fluttering  in chest, chest pain or pressure, breathlessness when lying flat, swollen legs or swollen feet.  NEUROLOGIC:  POSITIVE for  headaches  DERMATOLOGIC: NEGATIVE for rashes, new moles or change in mole(s)  PULMONARY: NEGATIVE SOB at rest, SOB with activity, dry cough, productive cough, coughing up blood, wheezing or whistling when breathing.    GASTROINTESTINAL: NEGATIVE for nausea or vomitting, loose or watery stools, fat or grease in stools, constipation, abdominal pain, bowel movements black in color or blood noted.  GENITOURINARY: NEGATIVE for pain during urination, blood in urine, urinating more frequently than usual, irregular menstrual periods.  MUSCULOSKELETAL: NEGATIVE for muscle pain, bone or joint pain, swollen joints.  ENDOCRINE: NEGATIVE for increased thirst or urination, diabetes.  LYMPHATIC: NEGATIVE for swollen lymph nodes, lumps or bumps in the breasts or nipple discharge.    Physical Examination:  Vitals: Ht 1.829 m (6')   Wt 99.3 kg (219 lb)   BMI 29.70 kg/m    BMI= Body mass index is 29.7 kg/m .         [place] Total Score 7/6/2020   Total score - [place] 16          SpO2 Readings from Last 4 Encounters:   06/03/20 97%   03/12/20 97%   03/11/20 98%   11/25/19 97%       GENERAL APPEARANCE: alert and no distress  EYES: Eyes grossly normal to inspection  HENT: mouth without ulcers or lesions  NECK: generous size  LUNGS: no shortness of breath , cough  NEURO: mentation intact, speech normal and cranial nerves 2-12 appear intact  PSYCH: affect normal/bright      Impression/Plan:    Severe obstructive sleep apnea by Home Sleep Apnea Test 2019, history of intolerance to positive airway pressure therapy though he had minimal coaching, guidance and set pressures himself. Current symptoms of excessive daytime sleepiness (ESS 16). Comorbid hypertension, family history of coronary artery disease.    - Seems reasonable candidate for Inspire tough I am not sure if HSAT rather than Polysomnogram qualifies for  device  - Would also consider retrial of CPAP with formal mask fitting and Sleep therapy management     Sleep onset difficulties   Managed several years with trazadone  - We discussed behavioral therapy vs medical therapy      Obstructive sleep apnea reviewed.  Complications of untreated sleep apnea were reviewed.    Trey Combs MD     I spent 40 minutes with patient     CC: Gladys Marroquin

## 2020-07-07 ENCOUNTER — VIRTUAL VISIT (OUTPATIENT)
Dept: SLEEP MEDICINE | Facility: CLINIC | Age: 60
End: 2020-07-07
Attending: OTOLARYNGOLOGY
Payer: COMMERCIAL

## 2020-07-07 DIAGNOSIS — G47.33 OSA (OBSTRUCTIVE SLEEP APNEA): ICD-10-CM

## 2020-07-07 PROCEDURE — 99203 OFFICE O/P NEW LOW 30 MIN: CPT | Mod: 95 | Performed by: INTERNAL MEDICINE

## 2020-07-07 SDOH — HEALTH STABILITY: MENTAL HEALTH: HOW OFTEN DO YOU HAVE A DRINK CONTAINING ALCOHOL?: 2-4 TIMES A MONTH

## 2020-07-13 ENCOUNTER — MYC MEDICAL ADVICE (OUTPATIENT)
Dept: OTOLARYNGOLOGY | Facility: CLINIC | Age: 60
End: 2020-07-13

## 2020-08-22 DIAGNOSIS — E78.5 HYPERLIPIDEMIA LDL GOAL <130: ICD-10-CM

## 2020-08-22 DIAGNOSIS — I10 BENIGN ESSENTIAL HYPERTENSION: ICD-10-CM

## 2020-08-22 DIAGNOSIS — E66.09 CLASS 1 OBESITY DUE TO EXCESS CALORIES WITH SERIOUS COMORBIDITY AND BODY MASS INDEX (BMI) OF 34.0 TO 34.9 IN ADULT: ICD-10-CM

## 2020-08-22 DIAGNOSIS — E66.811 CLASS 1 OBESITY DUE TO EXCESS CALORIES WITH SERIOUS COMORBIDITY AND BODY MASS INDEX (BMI) OF 34.0 TO 34.9 IN ADULT: ICD-10-CM

## 2020-08-27 RX ORDER — NALTREXONE HYDROCHLORIDE AND BUPROPION HYDROCHLORIDE 8; 90 MG/1; MG/1
TABLET, EXTENDED RELEASE ORAL
Qty: 120 TABLET | Refills: 3 | OUTPATIENT
Start: 2020-08-27

## 2020-08-27 NOTE — TELEPHONE ENCOUNTER
Pt needs appt  LCV: 3-11-20(12 weeks with Shaila or Mary)  NCV: none  FYI to clinic RN  Scheduling has been notified to contact the pt for appointment.

## 2020-08-27 NOTE — TELEPHONE ENCOUNTER
Refill denied at this time. Patient needs appointment. Local Magnet message sent to patient to schedule virtual visit with Mary Childers PA-C.

## 2020-08-28 ENCOUNTER — TELEPHONE (OUTPATIENT)
Dept: OTOLARYNGOLOGY | Facility: CLINIC | Age: 60
End: 2020-08-28

## 2020-08-28 DIAGNOSIS — G47.33 OSA (OBSTRUCTIVE SLEEP APNEA): Primary | ICD-10-CM

## 2020-08-28 NOTE — TELEPHONE ENCOUNTER
Left message regarding scheduling surgery with Dr. Marroquin. Call back number provided, [phone].       Shaila Vann   Perioperative Coordinator  Department of Otolaryngology    Office: [phone]

## 2020-09-02 ENCOUNTER — VIRTUAL VISIT (OUTPATIENT)
Dept: SURGERY | Facility: CLINIC | Age: 60
End: 2020-09-02
Payer: COMMERCIAL

## 2020-09-02 VITALS — BODY MASS INDEX: 29.88 KG/M2 | HEIGHT: 72 IN | WEIGHT: 220.6 LBS

## 2020-09-02 DIAGNOSIS — E55.9 VITAMIN D DEFICIENCY: Primary | ICD-10-CM

## 2020-09-02 DIAGNOSIS — E66.811 CLASS 1 OBESITY DUE TO EXCESS CALORIES WITH SERIOUS COMORBIDITY AND BODY MASS INDEX (BMI) OF 34.0 TO 34.9 IN ADULT: ICD-10-CM

## 2020-09-02 DIAGNOSIS — E66.09 CLASS 1 OBESITY DUE TO EXCESS CALORIES WITH SERIOUS COMORBIDITY AND BODY MASS INDEX (BMI) OF 34.0 TO 34.9 IN ADULT: ICD-10-CM

## 2020-09-02 DIAGNOSIS — I10 BENIGN ESSENTIAL HYPERTENSION: ICD-10-CM

## 2020-09-02 DIAGNOSIS — E78.5 HYPERLIPIDEMIA LDL GOAL <130: ICD-10-CM

## 2020-09-02 RX ORDER — NALTREXONE HYDROCHLORIDE AND BUPROPION HYDROCHLORIDE 8; 90 MG/1; MG/1
2 TABLET, EXTENDED RELEASE ORAL 2 TIMES DAILY
Qty: 120 TABLET | Refills: 3 | Status: SHIPPED | OUTPATIENT
Start: 2020-09-02 | End: 2021-01-06

## 2020-09-02 ASSESSMENT — PAIN SCALES - GENERAL: PAINLEVEL: NO PAIN (0)

## 2020-09-02 ASSESSMENT — MIFFLIN-ST. JEOR: SCORE: 1858.64

## 2020-09-02 NOTE — PATIENT INSTRUCTIONS
Refill Contrave 2 tabs twice daily  Start topiramate ramp to 75mg   If having sleep apnea procedure will need to stop Contrave, let us know surgery date and we will give you plan for stopping and restarting before and after surgery.  Follow up Lauren Bloch MTM in 1 month to follow up Contrave and also new topiramate start  Follow up Mary in 3 months return MWM

## 2020-09-02 NOTE — PROGRESS NOTES
"Speedy Carlson is a 59 year old male who is being evaluated via a billable telephone visit.      The patient has been notified of following:     \"This telephone visit will be conducted via a call between you and your physician/provider. We have found that certain health care needs can be provided without the need for a physical exam.  This service lets us provide the care you need with a short phone conversation.  If a prescription is necessary we can send it directly to your pharmacy.  If lab work is needed we can place an order for that and you can then stop by our lab to have the test done at a later time.    Telephone visits are billed at different rates depending on your insurance coverage. During this emergency period, for some insurers they may be billed the same as an in-person visit.  Please reach out to your insurance provider with any questions.    If during the course of the call the physician/provider feels a telephone visit is not appropriate, you will not be charged for this service.\"    Patient has given verbal consent for Telephone visit?  Yes    What phone number would you like to be contacted at? cell    How would you like to obtain your AVS? Emmanuel    Phone call duration: 17 minutes    Mary Childers PA-C        Return Medical Weight Management Note     Speedy Carlson  MRN:  [id number]  :  1960  [place]:  2020    Dear Physician No Ref-Primary,    I had the pleasure of seeing your patient Speedy Carlson. He is a 59 year old male who I am continuing to see for treatment of obesity related to:       2019   I have the following health issues associated with obesity: Heart Disease, High Blood Pressure, High Cholesterol, Sleep Apnea   I have the following symptoms associated with obesity: None of the above       INTERVAL HISTORY:  [facility] follow up.    He has lost 31 lbs in total and 13 lbs since last visit  Stable around 218-220  Still taking Contrave    CURRENT WEIGHT:   220 " lbs 9.6 oz    Initial Weight (lbs): 252.3 lbs  Last Visits Weight: 105.8 kg (233 lb 3.2 oz)  Cumulative weight loss (lbs): 31.7  Weight Loss Percentage: 12.56%    Changes and Difficulties 9/2/2020   I have made the following changes to my diet since my last visit: Eating less pasta, pizza and fried foods   With regards to my diet, I am still struggling with: portion control, cravings at night   I have made the following changes to my activity/exercise since my last visit: N/A   With regards to my activity/exercise, I am still struggling with: Nothing       VITALS:  Ht 1.829 m (6')   Wt 100.1 kg (220 lb 9.6 oz)   BMI 29.92 kg/m      MEDICATIONS:   Current Outpatient Medications   Medication Sig Dispense Refill     albuterol (PROAIR HFA/PROVENTIL HFA/VENTOLIN HFA) 108 (90 Base) MCG/ACT inhaler Inhale 2 puffs into the lungs every 6 hours as needed for shortness of breath / dyspnea or wheezing       aspirin 81 MG tablet Take 81 mg by mouth daily       augmented betamethasone dipropionate (DIPROLENE) 0.05 % external gel Apply topically 2 times daily as needed       fenofibrate 145 MG tablet Take 145 mg by mouth daily        losartan (COZAAR) 50 MG tablet Take 50 mg by mouth daily        naltrexone-bupropion (CONTRAVE) 8-90 MG per 12 hr tablet Week 1: 1 tablet daily in AM; Week 2: 1 tablet in AM & PM, Week 3: 2 tablets in AM & 1 tablet in PM, Week 4 & on: 2 tablets in AM & PM. (Patient taking differently: Take 2 tablets by mouth 2 times daily ) 120 tablet 3     pravastatin (PRAVACHOL) 40 MG tablet Take 40 mg by mouth every morning        traZODone (DESYREL) 50 MG tablet Take 150 mg by mouth At Bedtime          Weight Loss Medication History Reviewed With Patient 9/2/2020   Which weight loss medications are you currently taking on a regular basis?  Contrave (naltrexone/bupropion)   Are you having any side effects from the weight loss medication that we have prescribed you? No       ASSESSMENT/PLAN:    MWM follow up.  Doing well on Contrave but hit a plateau and has hunger/cravings in the evening.  Refill Contrave 2 tabs twice daily  Start topiramate ramp to 75mg   If having sleep apnea procedure will need to stop Contrave, let us know surgery date and we will give you plan for stopping and restarting before and after surgery.  Follow up Lauren Bloch MTM in 1 month to follow up Contrave and also new topiramate start  Follow up Mary in 3 months return MWM          Sincerely,    Mary Childers PA-C

## 2020-09-02 NOTE — NURSING NOTE
Chief Complaint   Patient presents with     RECHECK     Follow up appointment.       Vitals:    09/02/20 1138   Weight: 100.1 kg (220 lb 9.6 oz)   Height: 1.829 m (6')       Body mass index is 29.92 kg/m .                            RANULFO CONCEPCION EMT

## 2020-09-02 NOTE — LETTER
2020       RE: Speedy Carlson  [address] [facility] [place], [place] [address]     Dear Colleague,    Thank you for referring your patient, Speedy Carlson, to the [facility] SURGICAL WEIGHT MANAGEMENT at [facility]. Please see a copy of my visit note below.    Return Medical Weight Management Note     Speedy Carlson  MRN:  [id number]  :  1960  CAMILO:  2020    Dear Physician No Ref-Primary,    I had the pleasure of seeing your patient Speedy Carlson. He is a 59 year old male who I am continuing to see for treatment of obesity related to:       2019   I have the following health issues associated with obesity: Heart Disease, High Blood Pressure, High Cholesterol, Sleep Apnea   I have the following symptoms associated with obesity: None of the above     INTERVAL HISTORY:  [facility] follow up.    He has lost 31 lbs in total and 13 lbs since last visit  Stable around 218-220  Still taking Contrave    CURRENT WEIGHT:   220 lbs 9.6 oz    Initial Weight (lbs): 252.3 lbs  Last Visits Weight: 105.8 kg (233 lb 3.2 oz)  Cumulative weight loss (lbs): 31.7  Weight Loss Percentage: 12.56%    Changes and Difficulties 2020   I have made the following changes to my diet since my last visit: Eating less pasta, pizza and fried foods   With regards to my diet, I am still struggling with: portion control, cravings at night   I have made the following changes to my activity/exercise since my last visit: N/A   With regards to my activity/exercise, I am still struggling with: Nothing     VITALS:  Ht 1.829 m (6')   Wt 100.1 kg (220 lb 9.6 oz)   BMI 29.92 kg/m      MEDICATIONS:   Current Outpatient Medications   Medication Sig Dispense Refill     albuterol (PROAIR HFA/PROVENTIL HFA/VENTOLIN HFA) 108 (90 Base) MCG/ACT inhaler Inhale 2 puffs into the lungs every 6 hours as needed for shortness of breath / dyspnea or wheezing       aspirin 81 MG tablet Take 81 mg by mouth daily        augmented betamethasone dipropionate (DIPROLENE) 0.05 % external gel Apply topically 2 times daily as needed       fenofibrate 145 MG tablet Take 145 mg by mouth daily        losartan (COZAAR) 50 MG tablet Take 50 mg by mouth daily        naltrexone-bupropion (CONTRAVE) 8-90 MG per 12 hr tablet Week 1: 1 tablet daily in AM; Week 2: 1 tablet in AM & PM, Week 3: 2 tablets in AM & 1 tablet in PM, Week 4 & on: 2 tablets in AM & PM. (Patient taking differently: Take 2 tablets by mouth 2 times daily ) 120 tablet 3     pravastatin (PRAVACHOL) 40 MG tablet Take 40 mg by mouth every morning        traZODone (DESYREL) 50 MG tablet Take 150 mg by mouth At Bedtime        Weight Loss Medication History Reviewed With Patient 9/2/2020   Which weight loss medications are you currently taking on a regular basis?  Contrave (naltrexone/bupropion)   Are you having any side effects from the weight loss medication that we have prescribed you? No       ASSESSMENT/PLAN:  MWM follow up. Doing well on Contrave but hit a plateau and has hunger/cravings in the evening.  Refill Contrave 2 tabs twice daily  Start topiramate ramp to 75mg   If having sleep apnea procedure will need to stop Contrave, let us know surgery date and we will give you plan for stopping and restarting before and after surgery.  Follow up Lauren Bloch MTM in 1 month to follow up Contrave and also new topiramate start  Follow up Mary in 3 months return MWM    Again, thank you for allowing me to participate in the care of your patient.  Sincerely,    Mary Childers PA-C

## 2020-09-02 NOTE — LETTER
"2020       RE: Speedy Carlson  [address] [facility] [place] [place] [address]     Dear Colleague,    Thank you for referring your patient, Speedy Carlson, to the [facility] SURGICAL WEIGHT MANAGEMENT at [facility]. Please see a copy of my visit note below.    Speedy Carlson is a 59 year old male who is being evaluated via a billable telephone visit.      The patient has been notified of following:     \"This telephone visit will be conducted via a call between you and your physician/provider. We have found that certain health care needs can be provided without the need for a physical exam.  This service lets us provide the care you need with a short phone conversation.  If a prescription is necessary we can send it directly to your pharmacy.  If lab work is needed we can place an order for that and you can then stop by our lab to have the test done at a later time.    Telephone visits are billed at different rates depending on your insurance coverage. During this emergency period, for some insurers they may be billed the same as an in-person visit.  Please reach out to your insurance provider with any questions.    If during the course of the call the physician/provider feels a telephone visit is not appropriate, you will not be charged for this service.\"    Patient has given verbal consent for Telephone visit?  Yes    What phone number would you like to be contacted at? cell    [facility]    Phone call duration: 17 minutes    Mary Childers PA-C        Return Medical Weight Management Note     Speedy Carlson  MRN:  [id number]  :  1960  CAMILO:  2020    Dear Physician No Ref-Primary,    I had the pleasure of seeing your patient Speedy Carlson. He is a 59 year old male who I am continuing to see for treatment of obesity related to:       2019   I have the following health issues associated with obesity: Heart " Disease, High Blood Pressure, High Cholesterol, Sleep Apnea   I have the following symptoms associated with obesity: None of the above       INTERVAL HISTORY:  [facility] follow up.    He has lost 31 lbs in total and 13 lbs since last visit  Stable around 218-220  Still taking Contrave    CURRENT WEIGHT:   220 lbs 9.6 oz    Initial Weight (lbs): 252.3 lbs  Last Visits Weight: 105.8 kg (233 lb 3.2 oz)  Cumulative weight loss (lbs): 31.7  Weight Loss Percentage: 12.56%    Changes and Difficulties 9/2/2020   I have made the following changes to my diet since my last visit: Eating less pasta, pizza and fried foods   With regards to my diet, I am still struggling with: portion control, cravings at night   I have made the following changes to my activity/exercise since my last visit: N/A   With regards to my activity/exercise, I am still struggling with: Nothing       VITALS:  Ht 1.829 m (6')   Wt 100.1 kg (220 lb 9.6 oz)   BMI 29.92 kg/m      MEDICATIONS:   Current Outpatient Medications   Medication Sig Dispense Refill     albuterol (PROAIR HFA/PROVENTIL HFA/VENTOLIN HFA) 108 (90 Base) MCG/ACT inhaler Inhale 2 puffs into the lungs every 6 hours as needed for shortness of breath / dyspnea or wheezing       aspirin 81 MG tablet Take 81 mg by mouth daily       augmented betamethasone dipropionate (DIPROLENE) 0.05 % external gel Apply topically 2 times daily as needed       fenofibrate 145 MG tablet Take 145 mg by mouth daily        losartan (COZAAR) 50 MG tablet Take 50 mg by mouth daily        naltrexone-bupropion (CONTRAVE) 8-90 MG per 12 hr tablet Week 1: 1 tablet daily in AM; Week 2: 1 tablet in AM & PM, Week 3: 2 tablets in AM & 1 tablet in PM, Week 4 & on: 2 tablets in AM & PM. (Patient taking differently: Take 2 tablets by mouth 2 times daily ) 120 tablet 3     pravastatin (PRAVACHOL) 40 MG tablet Take 40 mg by mouth every morning        traZODone (DESYREL) 50 MG tablet Take 150 mg by mouth At Bedtime          Weight  Loss Medication History Reviewed With Patient 9/2/2020   Which weight loss medications are you currently taking on a regular basis?  Contrave (naltrexone/bupropion)   Are you having any side effects from the weight loss medication that we have prescribed you? No       ASSESSMENT/PLAN:    MWM follow up. Doing well on Contrave but hit a plateau and has hunger/cravings in the evening.  Refill Contrave 2 tabs twice daily  Start topiramate ramp to 75mg   If having sleep apnea procedure will need to stop Contrave, let us know surgery date and we will give you plan for stopping and restarting before and after surgery.  Follow up Lauren Bloch MTM in 1 month to follow up Contrave and also new topiramate start  Follow up Mary in 3 months return MWM          Sincerely,    Mray Childers PA-C        Again, thank you for allowing me to participate in the care of your patient.      Sincerely,    Mary Childers PA-C

## 2020-09-08 DIAGNOSIS — E66.09 CLASS 1 OBESITY DUE TO EXCESS CALORIES WITHOUT SERIOUS COMORBIDITY WITH BODY MASS INDEX (BMI) OF 31.0 TO 31.9 IN ADULT: Primary | Chronic | ICD-10-CM

## 2020-09-08 DIAGNOSIS — E66.811 CLASS 1 OBESITY DUE TO EXCESS CALORIES WITHOUT SERIOUS COMORBIDITY WITH BODY MASS INDEX (BMI) OF 31.0 TO 31.9 IN ADULT: Primary | Chronic | ICD-10-CM

## 2020-09-08 DIAGNOSIS — E78.5 HYPERLIPIDEMIA LDL GOAL <130: Chronic | ICD-10-CM

## 2020-09-08 DIAGNOSIS — I10 BENIGN ESSENTIAL HYPERTENSION: ICD-10-CM

## 2020-09-08 RX ORDER — TOPIRAMATE 25 MG/1
TABLET, FILM COATED ORAL
Qty: 90 TABLET | Refills: 3 | Status: SHIPPED | OUTPATIENT
Start: 2020-09-08 | End: 2021-01-06 | Stop reason: SINTOL

## 2020-09-08 NOTE — TELEPHONE ENCOUNTER
Per Mary Childers's note from office visit on 9/2/2020, will start Topiramate ramp to 75mg. Sending rx to pharmacy.

## 2020-09-14 NOTE — TELEPHONE ENCOUNTER
Patient called back regarding scheduling surgery [facility] Dr. Marroquin. Offered patient upcoming dates for surgery on Wednesday. Explained Dr. Marroquin's schedule and the types of surgeries she does (I.e. sleep surgeries) patient was understanding. Patient will look at upcoming Wednesday's with patients wife and call back with ideal dates.     Shaila Vann  09/14/20 11:40 AM

## 2020-10-07 ENCOUNTER — HOSPITAL ENCOUNTER (OUTPATIENT)
Facility: AMBULATORY SURGERY CENTER | Age: 60
End: 2020-10-07
Attending: OTOLARYNGOLOGY
Payer: COMMERCIAL

## 2020-10-07 DIAGNOSIS — Z11.59 ENCOUNTER FOR SCREENING FOR OTHER VIRAL DISEASES: Primary | ICD-10-CM

## 2020-10-26 DIAGNOSIS — Z11.59 ENCOUNTER FOR SCREENING FOR OTHER VIRAL DISEASES: ICD-10-CM

## 2020-10-26 PROCEDURE — 99000 SPECIMEN HANDLING OFFICE-LAB: CPT | Performed by: PATHOLOGY

## 2020-10-26 PROCEDURE — U0003 INFECTIOUS AGENT DETECTION BY NUCLEIC ACID (DNA OR RNA); SEVERE ACUTE RESPIRATORY SYNDROME CORONAVIRUS 2 (SARS-COV-2) (CORONAVIRUS DISEASE [COVID-19]), AMPLIFIED PROBE TECHNIQUE, MAKING USE OF HIGH THROUGHPUT TECHNOLOGIES AS DESCRIBED BY CMS-2020-01-R: HCPCS | Mod: 90 | Performed by: PATHOLOGY

## 2020-10-27 ENCOUNTER — ANESTHESIA EVENT (OUTPATIENT)
Dept: SURGERY | Facility: AMBULATORY SURGERY CENTER | Age: 60
End: 2020-10-27

## 2020-10-27 LAB
LABORATORY COMMENT REPORT: NORMAL
SARS-COV-2 RNA SPEC QL NAA+PROBE: NEGATIVE
SARS-COV-2 RNA SPEC QL NAA+PROBE: NORMAL
SPECIMEN SOURCE: NORMAL
SPECIMEN SOURCE: NORMAL

## 2020-10-27 NOTE — ANESTHESIA PREPROCEDURE EVALUATION
Anesthesia Pre-Procedure Evaluation    Patient: Speedy Carlson   MRN:     [id number] Gender:   male   Age:    59 year old :      1960        Preoperative Diagnosis: HANNAH (obstructive sleep apnea) [G47.33]   Procedure(s):  INSERTION, PULSE GENERATOR, NEUROSTIMULATOR     LABS:  CBC:   Lab Results   Component Value Date    WBC 7.3 2014    WBC 9.1 2010    HGB 14.6 2014    HGB 14.1 2010    HCT 43.4 2014    HCT 42.9 2010     2014     2010     BMP:   Lab Results   Component Value Date     2014    POTASSIUM 4.2 2014    CHLORIDE 104 2014    CO2 26 2014    BUN 14 2014    CR 1.17 2014    GLC 97 2014    GLC 80 2007     COAGS: No results found for: PTT, INR, FIBR  POC: No results found for: BGM, HCG, HCGS  OTHER:   Lab Results   Component Value Date    RAVI 9.1 2014    ALBUMIN 4.1 2014    PROTTOTAL 7.5 2014    ALT 48 2014    AST 27 2014    ALKPHOS 72 2014    BILITOTAL 0.4 2014    TSH 0.74 2014    T4 1.09 2010        Preop Vitals    BP Readings from Last 3 Encounters:   20 105/69   20 129/79   20 114/85    Pulse Readings from Last 3 Encounters:   20 85   20 84   20 88      Resp Readings from Last 3 Encounters:   20 14   20 14   13 18    SpO2 Readings from Last 3 Encounters:   20 97%   20 97%   20 98%      Temp Readings from Last 1 Encounters:   20 36.3  C (97.4  F) (Temporal)    Ht Readings from Last 1 Encounters:   20 1.829 m (6')      Wt Readings from Last 1 Encounters:   20 100.1 kg (220 lb 9.6 oz)    Estimated body mass index is 29.92 kg/m  as calculated from the following:    Height as of 20: 1.829 m (6').    Weight as of 20: 100.1 kg (220 lb 9.6 oz).     LDA:        Past Medical History:   Diagnosis Date     Acute prostatitis 2004     Calculus of  kidney 1994    no recurrence     Cervical radiculopathy 10/4/2016    R side     Dry eye syndrome      Hypertension      Idiopathic urticaria      Impotence of organic origin      Insomnia      Mild intermittent asthma 1975    hx in childhood     Mixed hyperlipidemia      Obesity      HANNAH (obstructive sleep apnea)      Other acne      Prostatitis      Seborrhea capitis      Tinnitus      Varicella without mention of complication       Past Surgical History:   Procedure Laterality Date     COLONOSCOPY  2012     DENTAL SURGERY  1980    wisdom teeth     ENDOSCOPY DRUG INDUCED SLEEP N/A 6/3/2020    Procedure: DRUG-INDUCED SLEEP ENDOSCOPY;  Surgeon: Gladys Marroquin MD;  Location: [facility] OR     PHOTOREFRACTIVE KERATECTOMY  2000    s/p Lasik surgery     TONSILLECTOMY  1971    s/p Tonsillectomy      Allergies   Allergen Reactions     Liraglutide GI Disturbance     Saxenda      Vitamin D3 [Cholecalciferol] Rash     Azithromycin Rash     Skin peeling                 PHYSICAL EXAM:   Mental Status/Neuro: A/A/O; Age Appropriate   Airway: Facies: Feasible  Mallampati: Not Assessed  Mouth/Opening: Not Assessed  TM distance: Not Assessed  Neck ROM: Not Assessed   Respiratory:    CV:    Comments: Virtual visit     Dental: Normal Dentition                Assessment:   ASA SCORE: 2    H&P: History and physical reviewed and following examination; no interval change.    NPO Status: NPO Appropriate     Plan:   Anes. Type:  General   Pre-Medication: None   Induction:  IV (Standard)   Airway: ETT; Oral   Access/Monitoring: PIV   Maintenance: Balanced     Postop Plan:   Postop Pain: Opioids  Postop Sedation/Airway: Not planned  Disposition: Outpatient     PONV Management:   Adult Risk Factors:, Postop Opioids   Prevention: Ondansetron, Dexamethasone     CONSENT: Direct conversation   Plan and risks discussed with: Patient          Comments for Plan/Consent:  Chai Heard MD     ANESTHESIA PREOP  EVALUATION    PROCEDURE: Procedure(s):  INSERTION, PULSE GENERATOR, NEUROSTIMULATOR    HPI: Speedy Carlson is a 59 year old male who presents for above procedure.    PAST MEDICAL HISTORY:    Past Medical History:   Diagnosis Date     Acute prostatitis 12/2004     Calculus of kidney 1994    no recurrence     Cervical radiculopathy 10/4/2016    R side     Dry eye syndrome      Hypertension      Idiopathic urticaria      Impotence of organic origin      Insomnia      Mild intermittent asthma 1975    hx in childhood     Mixed hyperlipidemia      Obesity      HANNAH (obstructive sleep apnea)      Other acne      Prostatitis      Seborrhea capitis      Tinnitus      Varicella without mention of complication        PAST SURGICAL HISTORY:    Past Surgical History:   Procedure Laterality Date     COLONOSCOPY  2012     DENTAL SURGERY  1980    wisdom teeth     ENDOSCOPY DRUG INDUCED SLEEP N/A 6/3/2020    Procedure: DRUG-INDUCED SLEEP ENDOSCOPY;  Surgeon: Gladys Marroquin MD;  Location: [facility] OR     PHOTOREFRACTIVE KERATECTOMY  2000    s/p Lasik surgery     TONSILLECTOMY  1971    s/p Tonsillectomy       SOCIAL HISTORY:       Social History     Tobacco Use     Smoking status: Never Smoker     Smokeless tobacco: Never Used     Tobacco comment: cigar occ   Substance Use Topics     Alcohol use: Yes     Frequency: 2-4 times a month     Comment: 2-4 beers per week       ALLERGIES:     Allergies   Allergen Reactions     Liraglutide GI Disturbance     Saxenda      Vitamin D3 [Cholecalciferol] Rash     Azithromycin Rash     Skin peeling       MEDICATIONS:     (Not in a hospital admission)      Current Outpatient Medications   Medication Sig Dispense Refill     fenofibrate 145 MG tablet Take 145 mg by mouth daily        losartan (COZAAR) 50 MG tablet Take 50 mg by mouth daily        naltrexone-bupropion (CONTRAVE) 8-90 MG per 12 hr tablet Take 2 tablets by mouth 2 times daily 120 tablet 3     pravastatin (PRAVACHOL) 40 MG tablet Take  40 mg by mouth every morning        topiramate (TOPAMAX) 25 MG tablet 25 mg at bedtime for 1 week, 50 mg at bedtime for 1 week and 75 mg daily at bedtime thereafter 90 tablet 3     traZODone (DESYREL) 50 MG tablet Take 150 mg by mouth At Bedtime        Zolpidem Tartrate (AMBIEN PO) Take by mouth nightly as needed for sleep       albuterol (PROAIR HFA/PROVENTIL HFA/VENTOLIN HFA) 108 (90 Base) MCG/ACT inhaler Inhale 2 puffs into the lungs every 6 hours as needed for shortness of breath / dyspnea or wheezing       aspirin 81 MG tablet Take 81 mg by mouth daily       augmented betamethasone dipropionate (DIPROLENE) 0.05 % external gel Apply topically 2 times daily as needed         Current Outpatient Medications Ordered in Epic   Medication Sig Dispense Refill     fenofibrate 145 MG tablet Take 145 mg by mouth daily        losartan (COZAAR) 50 MG tablet Take 50 mg by mouth daily        naltrexone-bupropion (CONTRAVE) 8-90 MG per 12 hr tablet Take 2 tablets by mouth 2 times daily 120 tablet 3     pravastatin (PRAVACHOL) 40 MG tablet Take 40 mg by mouth every morning        topiramate (TOPAMAX) 25 MG tablet 25 mg at bedtime for 1 week, 50 mg at bedtime for 1 week and 75 mg daily at bedtime thereafter 90 tablet 3     traZODone (DESYREL) 50 MG tablet Take 150 mg by mouth At Bedtime        Zolpidem Tartrate (AMBIEN PO) Take by mouth nightly as needed for sleep       albuterol (PROAIR HFA/PROVENTIL HFA/VENTOLIN HFA) 108 (90 Base) MCG/ACT inhaler Inhale 2 puffs into the lungs every 6 hours as needed for shortness of breath / dyspnea or wheezing       aspirin 81 MG tablet Take 81 mg by mouth daily       augmented betamethasone dipropionate (DIPROLENE) 0.05 % external gel Apply topically 2 times daily as needed       No current Epic-ordered facility-administered medications on file.        PHYSICAL EXAM:    Vitals: T Data Unavailable, P Data Unavailable, BP Data Unavailable, R Data Unavailable, SpO2  , Weight   Wt Readings from  Last 2 Encounters:   09/02/20 100.1 kg (220 lb 9.6 oz)   07/06/20 99.3 kg (219 lb)       See doc flowsheet    NPO STATUS: see doc flowsheet    LABS:    BMP:  Recent Labs   Lab Test 08/12/14  1445      POTASSIUM 4.2   CHLORIDE 104   CO2 26   BUN 14   CR 1.17   GLC 97   RAVI 9.1       LFTs:   Recent Labs   Lab Test 08/12/14  1445   PROTTOTAL 7.5   ALBUMIN 4.1   BILITOTAL 0.4   ALKPHOS 72   AST 27   ALT 48       CBC:   Recent Labs   Lab Test 08/12/14  1445   WBC 7.3   RBC 5.14   HGB 14.6   HCT 43.4   MCV 84   MCH 28.4   MCHC 33.6   RDW 11.8          Coags:  No results for input(s): INR, PTT, FIBR in the last 60163 hours.    Imaging:  No orders to display       Rylan Heard MD  Anesthesiology Staff  Pager [phone]    10/27/2020  1:22 PM

## 2020-10-28 ENCOUNTER — ANESTHESIA (OUTPATIENT)
Dept: SURGERY | Facility: AMBULATORY SURGERY CENTER | Age: 60
End: 2020-10-28

## 2020-10-28 ENCOUNTER — HOSPITAL ENCOUNTER (OUTPATIENT)
Facility: AMBULATORY SURGERY CENTER | Age: 60
Discharge: HOME | End: 2020-10-28
Attending: OTOLARYNGOLOGY | Admitting: OTOLARYNGOLOGY
Payer: COMMERCIAL

## 2020-10-28 ENCOUNTER — TELEPHONE (OUTPATIENT)
Dept: OTOLARYNGOLOGY | Facility: CLINIC | Age: 60
End: 2020-10-28

## 2020-10-28 VITALS
TEMPERATURE: 97.8 F | RESPIRATION RATE: 18 BRPM | DIASTOLIC BLOOD PRESSURE: 85 MMHG | BODY MASS INDEX: 30.2 KG/M2 | HEART RATE: 75 BPM | SYSTOLIC BLOOD PRESSURE: 126 MMHG | WEIGHT: 223 LBS | OXYGEN SATURATION: 99 % | HEIGHT: 72 IN

## 2020-10-28 DIAGNOSIS — G47.33 OSA (OBSTRUCTIVE SLEEP APNEA): ICD-10-CM

## 2020-10-28 DIAGNOSIS — G47.33 OSA (OBSTRUCTIVE SLEEP APNEA): Primary | ICD-10-CM

## 2020-10-28 DIAGNOSIS — G47.33 OSA (OBSTRUCTIVE SLEEP APNEA): Primary | Chronic | ICD-10-CM

## 2020-10-28 PROCEDURE — 0466T PR INSRT CH WALL RESPIR ELTRD/RA & CONJ PULSE GEN: CPT | Mod: GC | Performed by: OTOLARYNGOLOGY

## 2020-10-28 PROCEDURE — 0466T: CPT

## 2020-10-28 PROCEDURE — C1767 GENERATOR, NEURO NON-RECHARG: HCPCS

## 2020-10-28 PROCEDURE — 64568 OPN IMPLTJ CRNL NRV NEA&PG: CPT | Mod: RT | Performed by: OTOLARYNGOLOGY

## 2020-10-28 PROCEDURE — 64568 OPN IMPLTJ CRNL NRV NEA&PG: CPT | Mod: RT

## 2020-10-28 DEVICE — LEAD SENSING INSPIRE RESPIRATORY 4340: Type: IMPLANTABLE DEVICE | Site: ABDOMEN | Status: FUNCTIONAL

## 2020-10-28 DEVICE — GENERATOR PULSE INSPIRE CPAP 3028: Type: IMPLANTABLE DEVICE | Site: CHEST  WALL | Status: FUNCTIONAL

## 2020-10-28 DEVICE — LEAD STIMULATION INSPIRE 4063-45: Type: IMPLANTABLE DEVICE | Site: NECK | Status: FUNCTIONAL

## 2020-10-28 RX ORDER — GLYCOPYRROLATE 0.2 MG/ML
INJECTION, SOLUTION INTRAMUSCULAR; INTRAVENOUS PRN
Status: DISCONTINUED | OUTPATIENT
Start: 2020-10-28 | End: 2020-10-28

## 2020-10-28 RX ORDER — CEFAZOLIN SODIUM 1 G/50ML
1 SOLUTION INTRAVENOUS SEE ADMIN INSTRUCTIONS
Status: DISCONTINUED | OUTPATIENT
Start: 2020-10-28 | End: 2020-10-28 | Stop reason: HOSPADM

## 2020-10-28 RX ORDER — CEFAZOLIN SODIUM 2 G/50ML
2 SOLUTION INTRAVENOUS
Status: COMPLETED | OUTPATIENT
Start: 2020-10-28 | End: 2020-10-28

## 2020-10-28 RX ORDER — SODIUM CHLORIDE, SODIUM LACTATE, POTASSIUM CHLORIDE, CALCIUM CHLORIDE 600; 310; 30; 20 MG/100ML; MG/100ML; MG/100ML; MG/100ML
INJECTION, SOLUTION INTRAVENOUS CONTINUOUS
Status: DISCONTINUED | OUTPATIENT
Start: 2020-10-28 | End: 2020-10-28 | Stop reason: HOSPADM

## 2020-10-28 RX ORDER — NALOXONE HYDROCHLORIDE 0.4 MG/ML
.1-.4 INJECTION, SOLUTION INTRAMUSCULAR; INTRAVENOUS; SUBCUTANEOUS
Status: DISCONTINUED | OUTPATIENT
Start: 2020-10-28 | End: 2020-10-29 | Stop reason: HOSPADM

## 2020-10-28 RX ORDER — KETOROLAC TROMETHAMINE 30 MG/ML
30 INJECTION, SOLUTION INTRAMUSCULAR; INTRAVENOUS EVERY 6 HOURS PRN
Status: DISCONTINUED | OUTPATIENT
Start: 2020-10-28 | End: 2020-10-29 | Stop reason: HOSPADM

## 2020-10-28 RX ORDER — OXYCODONE HYDROCHLORIDE 5 MG/1
5 TABLET ORAL EVERY 4 HOURS PRN
Status: DISCONTINUED | OUTPATIENT
Start: 2020-10-28 | End: 2020-10-29 | Stop reason: HOSPADM

## 2020-10-28 RX ORDER — PROPOFOL 10 MG/ML
INJECTION, EMULSION INTRAVENOUS PRN
Status: DISCONTINUED | OUTPATIENT
Start: 2020-10-28 | End: 2020-10-28

## 2020-10-28 RX ORDER — EPINEPHRINE 0.1 MG/ML
SYRINGE (ML) INJECTION PRN
Status: DISCONTINUED | OUTPATIENT
Start: 2020-10-28 | End: 2020-10-28 | Stop reason: HOSPADM

## 2020-10-28 RX ORDER — FENTANYL CITRATE 50 UG/ML
INJECTION, SOLUTION INTRAMUSCULAR; INTRAVENOUS PRN
Status: DISCONTINUED | OUTPATIENT
Start: 2020-10-28 | End: 2020-10-28

## 2020-10-28 RX ORDER — FENTANYL CITRATE 50 UG/ML
25-50 INJECTION, SOLUTION INTRAMUSCULAR; INTRAVENOUS
Status: DISCONTINUED | OUTPATIENT
Start: 2020-10-28 | End: 2020-10-29 | Stop reason: HOSPADM

## 2020-10-28 RX ORDER — HYDROMORPHONE HYDROCHLORIDE 1 MG/ML
.3-.5 INJECTION, SOLUTION INTRAMUSCULAR; INTRAVENOUS; SUBCUTANEOUS EVERY 10 MIN PRN
Status: DISCONTINUED | OUTPATIENT
Start: 2020-10-28 | End: 2020-10-29 | Stop reason: HOSPADM

## 2020-10-28 RX ORDER — PROPOFOL 10 MG/ML
INJECTION, EMULSION INTRAVENOUS CONTINUOUS PRN
Status: DISCONTINUED | OUTPATIENT
Start: 2020-10-28 | End: 2020-10-28

## 2020-10-28 RX ORDER — ONDANSETRON 2 MG/ML
INJECTION INTRAMUSCULAR; INTRAVENOUS PRN
Status: DISCONTINUED | OUTPATIENT
Start: 2020-10-28 | End: 2020-10-28

## 2020-10-28 RX ORDER — ACETAMINOPHEN 325 MG/1
975 TABLET ORAL ONCE
Status: COMPLETED | OUTPATIENT
Start: 2020-10-28 | End: 2020-10-28

## 2020-10-28 RX ORDER — AMOXICILLIN 250 MG
2 CAPSULE ORAL DAILY PRN
Qty: 10 TABLET | Refills: 0 | Status: SHIPPED | OUTPATIENT
Start: 2020-10-28 | End: 2021-01-20

## 2020-10-28 RX ORDER — SODIUM CHLORIDE, SODIUM LACTATE, POTASSIUM CHLORIDE, CALCIUM CHLORIDE 600; 310; 30; 20 MG/100ML; MG/100ML; MG/100ML; MG/100ML
INJECTION, SOLUTION INTRAVENOUS CONTINUOUS
Status: DISCONTINUED | OUTPATIENT
Start: 2020-10-28 | End: 2020-10-29 | Stop reason: HOSPADM

## 2020-10-28 RX ORDER — LIDOCAINE HYDROCHLORIDE AND EPINEPHRINE 10; 10 MG/ML; UG/ML
INJECTION, SOLUTION INFILTRATION; PERINEURAL PRN
Status: DISCONTINUED | OUTPATIENT
Start: 2020-10-28 | End: 2020-10-28 | Stop reason: HOSPADM

## 2020-10-28 RX ORDER — CEFAZOLIN SODIUM 1 G/3ML
INJECTION, POWDER, FOR SOLUTION INTRAMUSCULAR; INTRAVENOUS PRN
Status: DISCONTINUED | OUTPATIENT
Start: 2020-10-28 | End: 2020-10-28

## 2020-10-28 RX ORDER — MEPERIDINE HYDROCHLORIDE 25 MG/ML
12.5 INJECTION INTRAMUSCULAR; INTRAVENOUS; SUBCUTANEOUS
Status: DISCONTINUED | OUTPATIENT
Start: 2020-10-28 | End: 2020-10-29 | Stop reason: HOSPADM

## 2020-10-28 RX ORDER — ONDANSETRON 4 MG/1
4 TABLET, ORALLY DISINTEGRATING ORAL EVERY 30 MIN PRN
Status: DISCONTINUED | OUTPATIENT
Start: 2020-10-28 | End: 2020-10-29 | Stop reason: HOSPADM

## 2020-10-28 RX ORDER — CEPHALEXIN 500 MG/1
500 CAPSULE ORAL 3 TIMES DAILY
Qty: 21 CAPSULE | Refills: 0 | Status: SHIPPED | OUTPATIENT
Start: 2020-10-28 | End: 2020-11-04

## 2020-10-28 RX ORDER — KETAMINE HYDROCHLORIDE 10 MG/ML
INJECTION INTRAMUSCULAR; INTRAVENOUS PRN
Status: DISCONTINUED | OUTPATIENT
Start: 2020-10-28 | End: 2020-10-28

## 2020-10-28 RX ORDER — LIDOCAINE 40 MG/G
CREAM TOPICAL
Status: DISCONTINUED | OUTPATIENT
Start: 2020-10-28 | End: 2020-10-28 | Stop reason: HOSPADM

## 2020-10-28 RX ORDER — HYDROCODONE BITARTRATE AND ACETAMINOPHEN 5; 325 MG/1; MG/1
1-2 TABLET ORAL EVERY 4 HOURS PRN
Qty: 25 TABLET | Refills: 0 | Status: SHIPPED | OUTPATIENT
Start: 2020-10-28 | End: 2021-01-20

## 2020-10-28 RX ORDER — LIDOCAINE HYDROCHLORIDE 20 MG/ML
INJECTION, SOLUTION INFILTRATION; PERINEURAL PRN
Status: DISCONTINUED | OUTPATIENT
Start: 2020-10-28 | End: 2020-10-28

## 2020-10-28 RX ORDER — DEXAMETHASONE SODIUM PHOSPHATE 4 MG/ML
INJECTION, SOLUTION INTRA-ARTICULAR; INTRALESIONAL; INTRAMUSCULAR; INTRAVENOUS; SOFT TISSUE PRN
Status: DISCONTINUED | OUTPATIENT
Start: 2020-10-28 | End: 2020-10-28

## 2020-10-28 RX ORDER — ONDANSETRON 2 MG/ML
4 INJECTION INTRAMUSCULAR; INTRAVENOUS EVERY 30 MIN PRN
Status: DISCONTINUED | OUTPATIENT
Start: 2020-10-28 | End: 2020-10-29 | Stop reason: HOSPADM

## 2020-10-28 RX ADMIN — Medication 100 MCG: at 12:14

## 2020-10-28 RX ADMIN — Medication 100 MCG: at 12:15

## 2020-10-28 RX ADMIN — PROPOFOL 200 MG: 10 INJECTION, EMULSION INTRAVENOUS at 11:10

## 2020-10-28 RX ADMIN — KETAMINE HYDROCHLORIDE 10 MG: 10 INJECTION INTRAMUSCULAR; INTRAVENOUS at 12:06

## 2020-10-28 RX ADMIN — LIDOCAINE HYDROCHLORIDE 100 MG: 20 INJECTION, SOLUTION INFILTRATION; PERINEURAL at 11:04

## 2020-10-28 RX ADMIN — Medication 0.5 MG: at 13:12

## 2020-10-28 RX ADMIN — ONDANSETRON 4 MG: 2 INJECTION INTRAMUSCULAR; INTRAVENOUS at 11:03

## 2020-10-28 RX ADMIN — PROPOFOL: 10 INJECTION, EMULSION INTRAVENOUS at 12:47

## 2020-10-28 RX ADMIN — KETAMINE HYDROCHLORIDE 40 MG: 10 INJECTION INTRAMUSCULAR; INTRAVENOUS at 11:23

## 2020-10-28 RX ADMIN — DEXAMETHASONE SODIUM PHOSPHATE 4 MG: 4 INJECTION, SOLUTION INTRA-ARTICULAR; INTRALESIONAL; INTRAMUSCULAR; INTRAVENOUS; SOFT TISSUE at 11:03

## 2020-10-28 RX ADMIN — Medication 100 MCG: at 12:02

## 2020-10-28 RX ADMIN — PROPOFOL 100 MG: 10 INJECTION, EMULSION INTRAVENOUS at 11:22

## 2020-10-28 RX ADMIN — GLYCOPYRROLATE 0.2 MG: 0.2 INJECTION, SOLUTION INTRAMUSCULAR; INTRAVENOUS at 11:03

## 2020-10-28 RX ADMIN — FENTANYL CITRATE 100 MCG: 50 INJECTION, SOLUTION INTRAMUSCULAR; INTRAVENOUS at 11:04

## 2020-10-28 RX ADMIN — SODIUM CHLORIDE, SODIUM LACTATE, POTASSIUM CHLORIDE, CALCIUM CHLORIDE: 600; 310; 30; 20 INJECTION, SOLUTION INTRAVENOUS at 13:45

## 2020-10-28 RX ADMIN — Medication 100 MCG: at 12:16

## 2020-10-28 RX ADMIN — CEFAZOLIN SODIUM 1 G: 1 INJECTION, POWDER, FOR SOLUTION INTRAMUSCULAR; INTRAVENOUS at 15:35

## 2020-10-28 RX ADMIN — CEFAZOLIN SODIUM 2 G: 2 SOLUTION INTRAVENOUS at 11:35

## 2020-10-28 RX ADMIN — Medication 100 MCG: at 11:59

## 2020-10-28 RX ADMIN — CEFAZOLIN SODIUM 1 G: 1 SOLUTION INTRAVENOUS at 13:35

## 2020-10-28 RX ADMIN — PROPOFOL 200 MCG/KG/MIN: 10 INJECTION, EMULSION INTRAVENOUS at 11:05

## 2020-10-28 RX ADMIN — SODIUM CHLORIDE, SODIUM LACTATE, POTASSIUM CHLORIDE, CALCIUM CHLORIDE: 600; 310; 30; 20 INJECTION, SOLUTION INTRAVENOUS at 10:59

## 2020-10-28 RX ADMIN — OXYCODONE HYDROCHLORIDE 5 MG: 5 TABLET ORAL at 16:48

## 2020-10-28 RX ADMIN — Medication 0.5 MG: at 12:58

## 2020-10-28 RX ADMIN — ACETAMINOPHEN 975 MG: 325 TABLET ORAL at 09:25

## 2020-10-28 RX ADMIN — PROPOFOL 300 MG: 10 INJECTION, EMULSION INTRAVENOUS at 11:05

## 2020-10-28 RX ADMIN — Medication 100 MCG: at 11:57

## 2020-10-28 ASSESSMENT — MIFFLIN-ST. JEOR: SCORE: 1869.52

## 2020-10-28 NOTE — DISCHARGE INSTRUCTIONS
[facility]  Home Care Following Anesthesia  For 24 hours after surgery:  1. Get plenty of rest.  A responsible adult must stay with you for at least 24 hours after you leave the surgery center.  2. Do not drive or use heavy equipment.  If you have weakness or tingling, don't drive or use heavy equipment until this feeling goes away.   3. Do not drink alcohol.   4. Avoid strenuous or risky activities.  Ask for help when climbing stairs.  5. You may feel lightheaded.  IF so, sit for a few minutes before standing.  Have someone help you get up.   6. If you have nausea (feel sick to your stomach): Drink only clear liquids such as apple juice, ginger ale, broth or 7-Up.  Rest may also help.  Be sure to drink enough fluids.  Move to a regular diet as you feel able.   7. You may have a slight fever.  Call the doctor if your fever is over 100 F (37.7 C) (taken under the tongue) or lasts longer than 24 hours.  8. You may have a dry mouth, a sore throat, muscle aches or trouble sleeping. These should go away after 24 hours.  9. Do not make important or legal decisions.               Tips for taking pain medications  To get the best pain relief possible, remember these points:    Take pain medications as directed, before pain becomes severe.    Pain medication can upset your stomach: taking it with food may help.    Constipation is a common side effect of pain medication. Drink plenty of  fluids.    Eat foods high in fiber. Take a stool softener if recommended by your doctor or pharmacist.    Do not drink alcohol, drive or operate machinery while taking pain medications.    Ask about other ways to control pain, such as with heat, ice or relaxation.    Tylenol/Acetaminophen Consumption  To help encourage the safe use of acetaminophen, the makers of TYLENOL  have lowered the maximum daily dose for single-ingredient Extra Strength TYLENOL  (acetaminophen) products sold in the [place] from 8  pills per day (4,000 mg) to 6 pills per day (3,000 mg). The dosing interval has also changed from 2 pills every 4-6 hours to 2 pills every 6 hours.    If you feel your pain relief is insufficient, you may take Tylenol/Acetaminophen in addition to your narcotic pain medication.     Be careful not to exceed 3,000 mg of Tylenol/Acetaminophen in a 24 hour period from all sources.    If you are taking extra strength Tylenol/acetaminophen (500 mg), the maximum dose is 6 tablets in 24 hours.    If you are taking regular strength acetaminophen (325 mg), the maximum dose is 9 tablets in 24 hours.    Call a doctor for any of the followin. Signs of infection (fever, growing tenderness at the surgery site, a large amount of drainage or bleeding, severe pain, foul-smelling drainage, redness, swelling).  2. It has been over 8 to 10 hours since surgery and you are still not able to urinate (pass water).  3. Headache for over 24 hours.  4. Numbness, tingling or weakness the day after surgery (if you had spinal anesthesia).  5. Signs of Covid-19 infection (temperature over 100 degrees, shortness of breath, cough, loss of taste/smell, generalized body aches, persistent headache, chills, sore throat, nausea/vomiting/diarrhea)  Your doctor is:  Dr. Gladys Marroquin, ENT Otolaryngology: [phone]                    Or dial [phone] and ask for the resident on call for:  ENT Otolaryngology  For emergency care, call the:  [facility]:  [phone] (TTY for hearing impaired: [phone])

## 2020-10-28 NOTE — ANESTHESIA POSTPROCEDURE EVALUATION
Anesthesia POST Procedure Evaluation    Patient: Speedy Carlson   MRN:     [id number] Gender:   male   Age:    59 year old :      1960        Preoperative Diagnosis: HANNAH (obstructive sleep apnea) [G47.33]   Procedure(s):  INSERTION, PULSE GENERATOR, NEUROSTIMULATOR   Postop Comments: No value filed.     Anesthesia Type: General       Disposition: Outpatient   Postop Pain Control: Uneventful            Sign Out: Well controlled pain   PONV: No   Neuro/Psych: Uneventful            Sign Out: Acceptable/Baseline neuro status   Airway/Respiratory: Uneventful            Sign Out: Acceptable/Baseline resp. status   CV/Hemodynamics: Uneventful            Sign Out: Acceptable CV status   Other NRE: NONE   DID A NON-ROUTINE EVENT OCCUR? No         Last Anesthesia Record Vitals:  CRNA VITALS  10/28/2020 1538 - 10/28/2020 1638      10/28/2020             Pulse:  81    SpO2:  100 %    Resp Rate (set):  10          Last PACU Vitals:  Vitals Value Taken Time   /79 10/28/20 1630   Temp 36.2  C (97.1  F) 10/28/20 1630   Pulse 81 10/28/20 1637   Resp 6 10/28/20 1637   SpO2 98 % 10/28/20 1637   Temp src     NIBP     Pulse     SpO2     Resp     Temp     Ht Rate     Temp 2     Vitals shown include unvalidated device data.      Electronically Signed By: Carlos Gilmore MD, 2020, 6:02 PM

## 2020-10-28 NOTE — BRIEF OP NOTE
[facility]    Brief Operative Note    Pre-operative diagnosis: HANNAH (obstructive sleep apnea) [G47.33]  Post-operative diagnosis Same as pre-operative diagnosis    Procedure: Procedure(s):  INSERTION, PULSE GENERATOR, NEUROSTIMULATOR  Surgeon: Surgeon(s) and Role:     * Gladys Marroquin MD - Primary     * German Villalobos MD - Resident - Assisting  Anesthesia: General   Estimated blood loss: Less than 10 ml  Drains: None  Specimens: * No specimens in log *  Findings:   see op note.  Complications: None.  Implants:   Implant Name Type Inv. Item Serial No.  Lot No. LRB No. Used Action   LEAD STIMULATION INSPIRE 4063-45 Leads LEAD STIMULATION INSPIRE 4063-45 [id number] [employer]  Right 1 Implanted   LEAD SENSING INSPIRE RESPIRATORY 4340 Leads LEAD SENSING INSPIRE RESPIRATORY 4340 [id number] [facility]  Right 1 Implanted   GENERATOR PULSE INSPIRE CPAP 3028 Neurology device GENERATOR PULSE INSPIRE CPAP 3028 [id number] [employer]  Right 1 Implanted

## 2020-10-29 ENCOUNTER — NURSE TRIAGE (OUTPATIENT)
Dept: NURSING | Facility: CLINIC | Age: 60
End: 2020-10-29

## 2020-10-29 NOTE — OP NOTE
Procedure Date 10/29/2020    PREOPERATIVE DIAGNOSIS:  Severe obstructive sleep apnea, intolerant to positive airway pressure therapy.        POSTOPERATIVE DIAGNOSIS:  Severe obstructive sleep apnea, intolerant to positive airway pressure therapy.        PROCEDURES:    1.  Twelfth cranial nerve/hypoglossal stimulation implant along with placement of a right pleural respiration sensor.    2.  Electronic analysis of implanted neurostimulator and pulse generator system.        SURGEON:  Gladys Marroquin MD        ASSISTANTS:  MD Shiva Schroeder MD      ANESTHESIA:  General endotracheal.        SPECIMENS REMOVED:  None.        COMPLICATIONS:  None.        ESTIMATED BLOOD LOSS:  50cc        INDICATIONS:  Patient is a 59 year old male with a history of severe obstructive sleep apnea.  Patient has been intolerant to positive airway pressure therapy and has not been able to achieve consistent benefit from medical management.  They met clinical polysomnographic and endoscopic screening criteria for hypoglossal nerve stimulation implant.        FINDINGS:  The patient had a fairly normal 12th cranial nerve though the branches to the protrusors were very thin and had many brach points.  The C1 branch was incorporated.  The implanted pulse generator was placed in the right chest wall.  The sensing lead was placed at approximately the fifth intercostal space.        DESCRIPTION OF PROCEDURE:  The patient was brought into the operating room, placed on the operating table in supine position.  A member of the Department of Anesthesia was present and intubated the patient via nasal intubation.  The tube was secured and the table was turned 180 degrees.  A shoulder roll  Was placed for positioning.  The arms were protected with padding.  The incisions for the neck, chest wall and IPG incisions were marked preprocedurely.  The neck and chest wall incisions were injected with approximately 5 mL of 1% lidocaine with  epinephrine.  Monitoring electrodes were placed in the genioglossus and hyoglossal muscle of the tongue.  The electrodes were then connected to the NIM box for intraoperative nerve monitoring.  The patient was then prepped and draped in our normal sterile fashion with the head turned to the left.  A timeout was taken to correctly identify the patient and the procedure.      A modified submandibular gland incision was made in the right upper neck approximately 1 fingerbreadth below the mandible and in a natural skin crease.  Dissection was then carried down through the subcutaneous tissues and the platysma muscles were divided.  We eventually encountered the anterior border of the submandibular gland.  The inferior and anterior border of the gland were then dissected free from the underlying tissues.  This allowed the submandibular gland to be retracted posterior superiorly.  We were then able to find the tendon of the digastric muscle, which was directly underneath the submandibular gland.  The tendon and the muscle were dissected free and retracted inferiorly.  The posterior border of the anterior belly of the digastric was skeletonized.  We then were able to visualize the mylohyoid muscle.  Dissection was carried down into the digastric triangle where the hypoglossal muscle was identified.  A large vein was identified  next to the nerve and this was ligated.  The mylohyoid muscle was then retracted anteriorly.  The intraoperative microscope was then brought into the field.  The hypoglossal nerve was then dissected towards the floor of the mouth.  The individual branches of the 12th cranial nerve were then dissected under the microscope.  The anterior border of the hypoglossal muscle was identified.  The lateral branches of the retrusor muscles were identified and tested intraoperatively using the NIM monitor.  The lateral branches were excluded.  The electrode for the hypoglossal nerve stimulator was placed  distally, incorporating C1 and the protrusor branches only.  We then secured using the provided suture anchors.        A 5 cm incision was then made in the right upper chest.  Dissection was carried down to the pectoralis muscle.  The inferior pocket was created deep to the subcutaneous layer and superficial to the pectoralis muscle.  A third incision was made in the axillary line just adjacent to the nipple.  Dissection was carried down through the skin and subcutaneous tissue.  The serratus muscle was identified and incised.  Self-retaining retractors were used to expose the pocket.  The external intercostal muscles were then identified.  A tunnel was created between the external and internal intercostal muscles.  The pleural respiration sensing lead was placed into the pocket.  The sensor was then sutured to the fascia using the provided suture anchors.  The pleural respiration sensing lead was then tunneled into the subclavicular pocket.  The stimulation lead was also tunneled in the subplatysmal plane into the pocket.  We then connected the sensing as well as stimulation lead to the implantable pulse generator.  Diagnostic evaluation was run which confirmed a good respiration signal.  We then observed motion of the tongue.  The tongue was noted to have a retraction component and therefor the implant in the neck was readjusted.  We flipped the orientation and stimulation was again tested. This resulted in poor tongue protrusion.  The implant cuff was reoriented with electrodes superior, and the cuff was placed more distally.  The patient had good bilateral tongue protrusion at this time.  The IPG was secured loosely to the pectoralis fascia using silk sutures.  All the wounds were thoroughly irrigated with irrigation.  The wound was then closed in layers using 3-0 Vicryl for the deep layer and 4-0 nylon for the skin layer.  Pressure dressings were placed.  The patient was awakened, extubated and transferred to  the recovery room in stable condition.        Dr. Marroquin was present for the entire procedure    I, Gladys Marroquin MD, was present during the key portions of the procedure, and I was immediately available for the entire procedure between opening and closing.    German Villalobos MD PGY4

## 2020-10-29 NOTE — TELEPHONE ENCOUNTER
Message sent to Dr. Marroquin for additional recommendations. Will reach out to patient for follow up with any additional recommendations.     Zuri Burgos RN

## 2020-10-29 NOTE — TELEPHONE ENCOUNTER
Call from Issa and wife Betina, regarding ongoing fever of 100.0-100.4 with frontal headache since INSERTION, PULSE GENERATOR, NEUROSTIMULATOR (Right Neck) surgery 10/28/20.    Did speak with resident on call last night.    Hydrocodone he has taken for surgical pain, which helps that pain has not helped with headache. Had been told not to take additional tylenol as is present in hydrocodone.  Advised may try ibuprofen products for headache and fever.  Reports sore throat, advised is common after general surgery, discussed use of warm tea with honey and duration sore throat can last.  Does have right ear pain after surgery, he is not concerned with that as was told that could happen.  Denies vision or hearing changes, has tinnitus, which is not new,.  Denies nausea/vomiting.  Advised to ensure is drinking plenty of water to stay hydrated.  Best number to contact Issa with additional advice/follow up is [phone]  Additional Information    Negative: Sounds like a life-threatening emergency to the triager    Negative: Chest pain    Negative: Difficulty breathing    Negative: Surgical incision symptoms and questions    Negative: Discomfort (pain, burning or stinging) when passing urine and male    Negative: Discomfort (pain, burning or stinging) when passing urine and female    Negative: Constipation    Negative: New or worsening leg (calf, thigh) pain    Negative: New or worsening leg swelling    Negative: Dizziness is severe, or persists > 24 hours after surgery    Negative: Symptoms arising from use of a urinary catheter (Duran or Coude)    Negative: Cast problems or questions    Negative: Bright red, wide-spread, sunburn-like rash    Negative: SEVERE headache and after spinal (epidural) anesthesia    Negative: Vomiting and persists > 4 hours    Negative: Vomiting and abdomen looks much more swollen than usual    Negative: Drinking very little and dehydration suspected (e.g., no urine > 12 hours, very dry mouth,  "very lightheaded)    Negative: Patient sounds very sick or weak to the triager    Negative: Sounds like a serious complication to the triager    Negative: Fever > 100.4 F (38.0 C)    Negative: Caller has URGENT question and triager unable to answer question    Negative: SEVERE post-op pain (e.g., excruciating, pain scale 8-10) that is not controlled with pain medications    Negative: Headache and after spinal (epidural) anesthesia and not severe    Negative: Fever present > 3 days (72 hours)    Negative: Patient wants to be seen    Caller has NON-URGENT question and triager unable to answer question    Answer Assessment - Initial Assessment Questions  1. SYMPTOM: \"What's the main symptom you're concerned about?\" (e.g., pain, fever, vomiting)      Fever, 100-100.4, frontal headache  2. ONSET: \"When did fever, headache  start?\"      After surgery  3. SURGERY: \"What surgery was performed?\"      INSERTION, PULSE GENERATOR, NEUROSTIMULATOR (Right Neck)  4. DATE of SURGERY: \"When was surgery performed?\"       10/28/20  5. ANESTHESIA: \" What type of anesthesia did you have?\" (e.g., general, spinal, epidural, local)      general  6. PAIN: \"Is there any pain?\" If so, ask: \"How bad is it?\"  (Scale 1-10; or mild, moderate, severe)      Incisional pain, moderate  7. FEVER: \"Do you have a fever?\" If so, ask: \"What is your temperature, how was it measured, and when did it start?\"      Yes 100-100.4 orally  8. VOMITING: \"Is there any vomiting?\" If yes, ask: \"How many times?\"      Denies  9. BLEEDING: \"Is there any bleeding?\" If so, ask: \"How much?\" and \"Where?\"      Small amount of blood on dressing  10. OTHER SYMPTOMS: \"Do you have any other symptoms?\" (e.g., drainage from wound, painful urination, constipation)        Right ear pain, denies hearing changes, vision changes    Protocols used: POST-OP SYMPTOMS AND [id number]      "

## 2020-10-29 NOTE — TELEPHONE ENCOUNTER
Patient and wife called in because the patient had a temperature of 100.4 F this evening. He has had no increasing pain or swelling at the surgical sites. He has a mild headache. No difficulty breathing, leg pain, or leg swelling. It hurt to urinate earlier today but has since been ok. I advised them that a true fever is 100.8F and up, and to call if he reaches that point or develops symptoms. Patient and wife demonstrated understanding.    Sadiq Lebron MD  Otolaryngology-Head & Neck Surgery PGY4  Please contact ENT with questions by dialing [phone] and entering job code [phone] when prompted.

## 2020-10-29 NOTE — TELEPHONE ENCOUNTER
Calling patient back to discuss post-operative concerns. Patient denies any pain at incision sites. Patient reports that his only concerns is a frontal headache and low grade fever at 100.0-100.4. Patient is informed that his headache could be related to head position during surgery. Patient is advised to take ibuprofen as well as an extra 500 mg of tylenol to help with the headache and the fever. Patient is advised to try getting up and walking around his home for 5 minutes 3-4 times per day and to focus on slow deep inhalations through pursed lips in the event that the fever is from lung atelectasis related to being supine for an extended period of time during surgery. Patient is informed that this can cause a mild fever as well. Patient is instructed to evaluate incision sites for redness/swelling or drainage if he has not noted improvement by this afternoon. He is in agreement with this plan. Writer will contact patient this afternoon to evaluate status. Patient is appreciative of the call.     Zuri Burgos RN

## 2020-11-02 NOTE — TELEPHONE ENCOUNTER
Calling to follow up on patient's symptoms. Patient reports that he is doing better. Denies fever. Denies headache. Patient reports right ear pain that sharp but is improving. He denies any ear drainage. He reports bilateral tinnitus but states he has had this in the past. Overall patient is doing better. Writer will discuss recommendations for ear pain with Dr. Marroquin and reach out to patient should there be any.    Zuri Burgos RN

## 2020-11-05 ENCOUNTER — ALLIED HEALTH/NURSE VISIT (OUTPATIENT)
Dept: OTOLARYNGOLOGY | Facility: CLINIC | Age: 60
End: 2020-11-05
Payer: COMMERCIAL

## 2020-11-05 DIAGNOSIS — Z48.02 VISIT FOR SUTURE REMOVAL: Primary | ICD-10-CM

## 2020-11-05 PROCEDURE — 99207 PR NO CHARGE NURSE ONLY: CPT

## 2020-11-05 NOTE — PROGRESS NOTES
Speedy YUAN Smtihemma presents to the clinic for removal of sutures and sutures,staples, steri strips. The patient has had sutures in place for 8 days. There has been no patient reported signs or symptoms of infection or drainage. 3 running sutures are seen and located on the chest and chin. Tetanus status is up to date. All sutures were easily removed today. Routine wound care discussed by the RN or provider. The patient will follow up as needed with Dr. Marroquin.  Patient inquired about ways to minimize scarring. Informed patient he can try using Mederma to the incision sites to help minimize the appearance of scars.

## 2020-11-09 DIAGNOSIS — G47.33 OSA (OBSTRUCTIVE SLEEP APNEA): Primary | ICD-10-CM

## 2020-11-12 ENCOUNTER — VIRTUAL VISIT (OUTPATIENT)
Dept: OTOLARYNGOLOGY | Facility: CLINIC | Age: 60
End: 2020-11-12
Payer: COMMERCIAL

## 2020-11-12 ENCOUNTER — TELEPHONE (OUTPATIENT)
Dept: OTOLARYNGOLOGY | Facility: CLINIC | Age: 60
End: 2020-11-12

## 2020-11-12 VITALS — HEIGHT: 72 IN | BODY MASS INDEX: 30.2 KG/M2 | WEIGHT: 223 LBS

## 2020-11-12 DIAGNOSIS — G47.33 OSA (OBSTRUCTIVE SLEEP APNEA): Primary | ICD-10-CM

## 2020-11-12 PROCEDURE — 99213 OFFICE O/P EST LOW 20 MIN: CPT | Mod: TEL | Performed by: OTOLARYNGOLOGY

## 2020-11-12 ASSESSMENT — PAIN SCALES - GENERAL: PAINLEVEL: MILD PAIN (3)

## 2020-11-12 ASSESSMENT — MIFFLIN-ST. JEOR: SCORE: 1869.52

## 2020-11-12 NOTE — PROGRESS NOTES
"Speedy Carlson is a 59 year old male who is being evaluated via a billable telephone visit.      The patient has been notified of following:     \"This telephone visit will be conducted via a call between you and your physician/provider. We have found that certain health care needs can be provided without the need for a physical exam.  This service lets us provide the care you need with a short phone conversation.  If a prescription is necessary we can send it directly to your pharmacy.  If lab work is needed we can place an order for that and you can then stop by our lab to have the test done at a later time.    Telephone visits are billed at different rates depending on your insurance coverage. During this emergency period, for some insurers they may be billed the same as an in-person visit.  Please reach out to your insurance provider with any questions.    If during the course of the call the physician/provider feels a telephone visit is not appropriate, you will not be charged for this service.\"    Patient has given verbal consent for Telephone visit?  Yes    What phone number would you like to be contacted at? [phone]    How would you like to obtain your AVS? [employer]    Phone call duration: 20 minutes    Gladys Marroquin MD    CC: s/p Inspire implant on 10/28/2020    HPI: Patient reports that he has been having a lot of sensitivity with his incision sites.  He reports that he underestimated how significant the surgery would be in terms of recovery and pain.  He does report that the discomfort at his incision sites are getting better with it is slow but he is feeling some improvement.  Specifically where he is the most sore are his chest incision sites both the battery as well as respiration sensing lead.  He feels a lot of sensitivity when his shirt is rubbing against his incisions.  Also when he sleeps he tends to sleep on his left side but even that still causes a lot of soreness on his right side.  " He has been taking it easy in terms of not doing any exercises or overusing his right arm.    A/P:  Patient is healing from his surgery though it may be taking him a little bit longer than the average patient but this is not unusual.  We discussed placing a bandage over his incisions when he is wearing a shirt to try to decrease some of the rubbing sensitivity.  We discussed some scar care and starting some massages and range of motion exercises about 2 to 3 weeks after his surgery.  I tried to reassure him that I believe he is making progress even though it may be slow.  I will want to we touch base with him after he has been activated.

## 2020-11-12 NOTE — LETTER
"11/12/2020       RE: Speedy Carlson  [address] [facility] [address]     Dear Colleague,    Thank you for referring your patient, Speedy Carlson, to the [facility] [place] [facility]. Please see a copy of my visit note below.    Speedy Carlson is a 59 year old male who is being evaluated via a billable telephone visit.      The patient has been notified of following:     \"This telephone visit will be conducted via a call between you and your physician/provider. We have found that certain health care needs can be provided without the need for a physical exam.  This service lets us provide the care you need with a short phone conversation.  If a prescription is necessary we can send it directly to your pharmacy.  If lab work is needed we can place an order for that and you can then stop by our lab to have the test done at a later time.    Telephone visits are billed at different rates depending on your insurance coverage. During this emergency period, for some insurers they may be billed the same as an in-person visit.  Please reach out to your insurance provider with any questions.    If during the course of the call the physician/provider feels a telephone visit is not appropriate, you will not be charged for this service.\"    Patient has given verbal consent for Telephone visit?  Yes    What phone number would you like to be contacted at? [phone]    [facility] would you [facility]    Phone call duration: 20 minutes    Gladys Marroquin MD    CC: s/p Inspire implant on 10/28/2020    HPI: Patient reports that he has been having a lot of sensitivity with his incision sites.  He reports that he underestimated how significant the surgery would be in terms of recovery and pain.  He does report that the discomfort at his incision sites are getting better with it is slow but he is feeling some " improvement.  Specifically where he is the most sore are his chest incision sites both the battery as well as respiration sensing lead.  He feels a lot of sensitivity when his shirt is rubbing against his incisions.  Also when he sleeps he tends to sleep on his left side but even that still causes a lot of soreness on his right side.  He has been taking it easy in terms of not doing any exercises or overusing his right arm.    A/P:  Patient is healing from his surgery though it may be taking him a little bit longer than the average patient but this is not unusual.  We discussed placing a bandage over his incisions when he is wearing a shirt to try to decrease some of the rubbing sensitivity.  We discussed some scar care and starting some massages and range of motion exercises about 2 to 3 weeks after his surgery.  I tried to reassure him that I believe he is making progress even though it may be slow.  I will want to we touch base with him after he has been activated.      Again, thank you for allowing me to participate in the care of your patient.      Sincerely,    Gladys Marroquin MD

## 2020-11-12 NOTE — TELEPHONE ENCOUNTER
"LVM stating I am attempting to schedule patient's follow up w/Dr. Marroquin. Stated the follow up would be in 8 week (Around 01/07/20). Left ENT scheduling number for patient to call and make appointment.     SCHEDULING INSTRUCTIONS: Please schedule patient for in clinic follow up with Dr. Marroquin in [facility] around 01/07/2020. Please include \"8 week f/u\" in appt notes.    Thanks you.  "

## 2020-11-13 ENCOUNTER — ANCILLARY PROCEDURE (OUTPATIENT)
Dept: GENERAL RADIOLOGY | Facility: CLINIC | Age: 60
End: 2020-11-13
Attending: OTOLARYNGOLOGY
Payer: COMMERCIAL

## 2020-11-13 DIAGNOSIS — G47.33 OSA (OBSTRUCTIVE SLEEP APNEA): ICD-10-CM

## 2020-11-13 PROCEDURE — 71046 X-RAY EXAM CHEST 2 VIEWS: CPT | Mod: GC | Performed by: RADIOLOGY

## 2020-11-13 PROCEDURE — 70360 X-RAY EXAM OF NECK: CPT | Performed by: RADIOLOGY

## 2020-11-22 ENCOUNTER — MYC MEDICAL ADVICE (OUTPATIENT)
Dept: OTOLARYNGOLOGY | Facility: CLINIC | Age: 60
End: 2020-11-22

## 2020-11-22 DIAGNOSIS — G47.33 OSA (OBSTRUCTIVE SLEEP APNEA): Primary | ICD-10-CM

## 2020-11-24 ENCOUNTER — TELEPHONE (OUTPATIENT)
Dept: OTOLARYNGOLOGY | Facility: CLINIC | Age: 60
End: 2020-11-24

## 2020-11-24 ENCOUNTER — OFFICE VISIT (OUTPATIENT)
Dept: OTOLARYNGOLOGY | Facility: CLINIC | Age: 60
End: 2020-11-24
Payer: COMMERCIAL

## 2020-11-24 ENCOUNTER — ANCILLARY PROCEDURE (OUTPATIENT)
Dept: GENERAL RADIOLOGY | Facility: CLINIC | Age: 60
End: 2020-11-24
Attending: OTOLARYNGOLOGY
Payer: COMMERCIAL

## 2020-11-24 VITALS — TEMPERATURE: 98 F | OXYGEN SATURATION: 98 % | WEIGHT: 222 LBS | HEART RATE: 87 BPM | BODY MASS INDEX: 30.11 KG/M2

## 2020-11-24 DIAGNOSIS — F41.9 ANXIETY: Primary | ICD-10-CM

## 2020-11-24 DIAGNOSIS — G47.33 OSA (OBSTRUCTIVE SLEEP APNEA): ICD-10-CM

## 2020-11-24 DIAGNOSIS — H93.13 TINNITUS, BILATERAL: ICD-10-CM

## 2020-11-24 PROCEDURE — 99214 OFFICE O/P EST MOD 30 MIN: CPT | Mod: 24 | Performed by: OTOLARYNGOLOGY

## 2020-11-24 PROCEDURE — 70360 X-RAY EXAM OF NECK: CPT | Performed by: RADIOLOGY

## 2020-11-24 RX ORDER — LORAZEPAM 0.5 MG/1
0.5 TABLET ORAL 2 TIMES DAILY PRN
Qty: 14 TABLET | Refills: 0 | Status: SHIPPED | OUTPATIENT
Start: 2020-11-24 | End: 2020-12-01

## 2020-11-24 ASSESSMENT — PAIN SCALES - GENERAL: PAINLEVEL: MILD PAIN (3)

## 2020-11-24 NOTE — PROGRESS NOTES
CC: tinnitus    HPI: Patient reports that he is having bilateral tinnitus that is a 9 out of 10.  It is very bothersome and very distracting for the patient.  Patient reports that it is causing him to be very stressed out and anxious.  In addition to the tinnitus he has had some right ear discomfort.  Initially after surgery it was very sharp and painful.  The pain has gotten better.  Finally he is concerned about the wire in his neck.  It feels very tight to him.  He also feels like when he looks up he feels a lot of tension and stretching through his neck.  He is wondering if the wire itself is causing the tinnitus.  In terms of the tinnitus he reports that he did not have significant tinnitus prior to the surgery.  He reports minimal stress prior to surgery.  He reports no change to his hearing.  The tinnitus does not prevent him from falling asleep.  He reports he is actually able to fall asleep without any difficulty.  He does hear it first thing in the morning when he wakes up.    PE;  GEN: nad  EARS: Right ear canal appears normal.  No significant wax.  Right tympanic membrane is intact.  No middle ear fluid.  NECK: When the patient looks up you can see the outline of the wire.  He does have good range of motion of his neck and up and down.    A/P:  Patient's main complaint today is tinnitus.  I discussed with him that I do not know if the surgery itself caused the tinnitus but it may have played a factor.  I think the patient's tinnitus is mostly being exacerbated by anxiety and stress.  This then makes the tinnitus louder which causes him to have more anxiety and stress and he is in a bad biofeedback loop.  At one point patient was wondering if he could have the device taken out to if this would make the tinnitus better.  I discussed with him that I do not know for sure if removing the implant would make his tinnitus better and therefore I would want him to work through some more conservative management  first.  Additionally we took the device out we likely would not be able to reimplant the same side.  I discussed with the patient using an anxiolytic such as Ativan for very short-term basis to see if this would help decrease some of his anxiety.  I think doing some massage therapy would help with both his stress and attention and he may also help a little bit with his neck tension which I think is why he feels a lot of stretching through his neck.  He can also use warm heat around his jaw and his ear to see if that helps.  We talked about masking strategies with a fan or other noise particularly during the day and when he feels the tinnitus is very loud.  Use a fan at night to see if that helps him first thing in the morning.  Patient is agreeing to cognitive behavioral therapy to try to help with the tinnitus.  I will also get a audiogram with the patient comes back and see me see in 2 weeks.  Patient I also discussed delaying his activation.  He is concerned that it might exacerbate his tinnitus and at this time I think I would agree.  I will send a message to sleep medicine department to cancel his activation appointment and I will update them once we feel like we have a better control of the tinnitus before we activate him.    I spent a total of 25 minutes face-to-face with Speedy Carlson during today's office visit.  Over 50% of this time was spent counseling the patient on and/or coordinating care as documented in my assessment and plan.

## 2020-11-24 NOTE — NURSING NOTE
Chief Complaint   Patient presents with     RECHECK     Follow up after Inspire         Pulse 87, temperature 98  F (36.7  C), weight 100.7 kg (222 lb), SpO2 98 %.    Eileen Vann, EMT

## 2020-11-24 NOTE — PATIENT INSTRUCTIONS
You were seen in the ENT clinic today with Dr. Marroquin    Recommendations for you:    -Cognitive therapy for tinnitus      We would like you to follow up in 2 weeks      Please call our clinic for any questions, concerns, and/or worsening symptoms.      Clinic #[phone]       Option 1 for scheduling.    Thank you for allowing us to be a part of your care!    Zuri MCLAUGHLIN RNCC    If you need to reach me my direct line is: [phone]

## 2020-11-24 NOTE — TELEPHONE ENCOUNTER
LVM after being unable to reach patient. Left ENT scheduling and Pod 4J number for patient to call and make appointment.    SCHEDULING INSTRUCTIONS: Please schedule patient for a 2 week f/u w/Dr. Marroquin and a WIN prior to that appointment. Around 12/08/2020.    Thanks.

## 2020-12-03 ENCOUNTER — VIRTUAL VISIT (OUTPATIENT)
Dept: PSYCHOLOGY | Facility: CLINIC | Age: 60
End: 2020-12-03
Payer: COMMERCIAL

## 2020-12-03 DIAGNOSIS — F43.22 ADJUSTMENT DISORDER WITH ANXIOUS MOOD: Primary | ICD-10-CM

## 2020-12-03 PROCEDURE — 90791 PSYCH DIAGNOSTIC EVALUATION: CPT | Mod: TEL | Performed by: PSYCHOLOGIST

## 2020-12-03 ASSESSMENT — COLUMBIA-SUICIDE SEVERITY RATING SCALE - C-SSRS
1. IN THE PAST MONTH, HAVE YOU WISHED YOU WERE DEAD OR WISHED YOU COULD GO TO SLEEP AND NOT WAKE UP?: NO
2. HAVE YOU ACTUALLY HAD ANY THOUGHTS OF KILLING YOURSELF?: NO
2. HAVE YOU ACTUALLY HAD ANY THOUGHTS OF KILLING YOURSELF LIFETIME?: NO
1. IN THE PAST MONTH, HAVE YOU WISHED YOU WERE DEAD OR WISHED YOU COULD GO TO SLEEP AND NOT WAKE UP?: NO

## 2020-12-03 ASSESSMENT — PATIENT HEALTH QUESTIONNAIRE - PHQ9
5. POOR APPETITE OR OVEREATING: MORE THAN HALF THE DAYS
SUM OF ALL RESPONSES TO PHQ QUESTIONS 1-9: 5

## 2020-12-03 ASSESSMENT — ANXIETY QUESTIONNAIRES
2. NOT BEING ABLE TO STOP OR CONTROL WORRYING: SEVERAL DAYS
6. BECOMING EASILY ANNOYED OR IRRITABLE: SEVERAL DAYS
1. FEELING NERVOUS, ANXIOUS, OR ON EDGE: NEARLY EVERY DAY
GAD7 TOTAL SCORE: 10
7. FEELING AFRAID AS IF SOMETHING AWFUL MIGHT HAPPEN: NOT AT ALL
IF YOU CHECKED OFF ANY PROBLEMS ON THIS QUESTIONNAIRE, HOW DIFFICULT HAVE THESE PROBLEMS MADE IT FOR YOU TO DO YOUR WORK, TAKE CARE OF THINGS AT HOME, OR GET ALONG WITH OTHER PEOPLE: SOMEWHAT DIFFICULT
5. BEING SO RESTLESS THAT IT IS HARD TO SIT STILL: NEARLY EVERY DAY
3. WORRYING TOO MUCH ABOUT DIFFERENT THINGS: NOT AT ALL

## 2020-12-03 NOTE — PROGRESS NOTES
"[facility]  Evaluator Name:  Lee    Credentials:  LP    PATIENT'S NAME: Speedy Carlson  PREFERRED NAME: Issa  PRONOUNS: he/his/him  MRN: [id number]  : 1960   ACCT. NUMBER:  [id number]  DATE OF SERVICE: 20  START TIME: 11:00  END TIME: 11:45  PREFERRED PHONE: cell  May we leave a program related message: Yes  SERVICE MODALITY:  Phone Visit:      Provider verified identity through the following two step process.  Patient provided:  Patient  and Patient address    The patient has been notified of the following:      \"We have found that certain health care needs can be provided without the need for a face to face visit.  This service lets us provide the care you need with a phone conversation.       I will have full access to your [place] medical record during this entire phone call.   I will be taking notes for your medical record.      Since this is like an office visit, we will bill your insurance company for this service.       There are potential benefits and risks of telephone visits (e.g. limits to patient confidentiality) that differ from in-person visits.?  Confidentiality still applies for telephone services, and nobody will record the visit.  It is important to be in a quiet, private space that is free of distractions (including cell phone or other devices) during the visit.??      If during the course of the call I believe a telephone visit is not appropriate, you will not be charged for this service\"     Consent has been obtained for this service by care team member: Yes         [place] ADULT Mental Health DIAGNOSTIC ASSESSMENT     Identifying Information:  Patient is a 60  year old,  person  The pronoun use throughout this assessment reflects the patient's chosen pronoun.  Patient was referred for an assessment by self.  Patient attended the session alone.      Chief Complaint:   The reason for seeking services at this time: mood disruption, fatigue.   The " "problem(s) began this year .  . Patient has attempted to resolve these concerns in the past through talking with their provider.        Social/Family History:  Patient reported they grew up in the [place], [place], [place] .  They were raised by biological parents .   Patient reported that that their childhood \"wonderfull\".  Patient described their current relationships with family of origin as good.  Has 2 brothers. The patient describes their cultural background as: family oriented, [facility].  Cultural influences and impact on patient's life structure, values, norms, and healthcare: no concerns, except for Covid recent f interpersona/  health work  family disruption.  Contextual influences on patient's health include: Health- advocates for themself.    These factors will be addressed in the Preliminary Treatment plan as needed.  Patient identified their preferred language to be English. Patient reported they does not need the assistance of an  or other support involved in therapy.      Patient reported had no significant delays in developmental tasks.   Patient's highest education level was graduate school  [employer]. Patient identified the following learning problems: none reported.  Modifications will not be used to assist communication in therapy.   Patient reports they are  able to understand written materials.     Patient reported the following relationship history -   Patient's current relationship status is partnered / significant other for 16 years. Patient identified their sexual orientation as heterosexual.  Patient reported having  2 child(kim)- blended family. Patient identified famiy and friends as part of their support system.  Patient identified the quality of these relationships as good.  Has had some recent disruption with their life. Patient's current living/housing situation involves staying in own home/apartment.  They live with their partner / family and they report that housing is " stable.      Is employed full time and reports they are able to function appropriately at work and other roles.  Patient reports their family finances are obtained through employment. Feels good their job- semi retired.. Has support. Patient does not identify finances as a current stressor.       Patient reported that they have not been involved with the legal system. Patient denies being on probation / parole / under the jurisdiction of the court.    Patient's Strengths and Limitations:  Patient identified the following strengths or resources that will help them succeed in treatment: commitment to health and well being, insight, intelligence and motivation. Things that may interfere with the patient's success in treatment include: none identified.      Personal and Family Medical History:   Patient did not report a family history of mental health concerns.       Patient reported the following previous diagnoses which include(s): none.    Patient reported symptoms began. This year. Patient has not received mental health services in the past.   Psychiatric Hospitalizations: None.  Patient denies a history of civil commitment.  Currently, patient is not currently receiving other mental health services.    Patient has had a physical exam to rule out medical causes for current symptoms.  Date of last physical exam was within the past year. Client was encouraged to follow up with PCP if symptoms were to develop. The patient has a [place] Primary Care Provider-  See emr.    Patient reports current medical concerns- see emr.  There are not significant appetite / nutritional concerns / weight changes.   Patient does not report a history of head injury / trauma / cognitive impairment.       Patient reports taking any current medications as prescribed.     Patient Allergies:  See emr     Current Mental Status Not observed  Appearance:               Not observed    Eye Contact:             Not observed  Psychomotor:             No concerns    Gait / station:         No concerns  Attitude / Demeanor:            Cooperative   Speech      Rate / Production: Normal/ Responsive      Volume:                  Normal  volume      Language:              intact  Mood:                         Normal  Affect:                         Appropriate    Thought Content:      Clear   Thought Process:     Logical    Associations:           No concerns  Insight:                                   Good   Judgment:                  Intact   Orientation:                All  Attention/concentration:      Good     Rating Scales:       See emr for:  PHQ-9 SCORE             ILIANA-7 SCORE     CGI:     First:Considering your total clinical experience with this particular patient population, how severe are the patient's symptoms at this time: 4                  Most recentCompared to the patient's condition at the START of treatment, this patient's condition is: 4         Substance Use:  Patient did not report a family history of substance use concerns; see medical history section for details.  Patient has not received chemical dependency treatment in the past.  Patient has not ever been to detox.       Patient is not currently receiving any chemical dependency treatment. Patient reported the following problems as a result of their substance use: none.     Patient denies using alcohol.   Patient denies using tobacco.  Patient denies using marijuana.  Patient  reports some caffeine use - about 2-3  ounzes a day.  Patient reports using/abusing the following substance(s). Patient reported no other substance use.      CAGE- AID:  None.  Substance Use: No symptoms     Based on the negative CAGE score and clinical interview there are not indications of drug or alcohol abuse.        Significant Losses / Trauma / Abuse / Neglect Issues:   .  There are not indications or report of significant loss, trauma, abuse or neglect issues related to: .  unwanted changes in family,     relationships,       Loss of health    Mother- recent health decline   Loss of father     And father-in-law  Loss of grand parents    Concerns for possible neglect are not present.      Safety Assessment:   Current Safety Concerns:  [place] Suicide Severity Rating Scale (Lifetime/Recent)  [place] Suicide Severity Rating (Lifetime/Recent)         1. Wish to be Dead (Lifetime)          No  1. Wish to be Dead (Recent)            No  2. Non-Specific Active Suicidal Thoughts (Lifetime)        No  2. Non-Specific Active Suicidal Thoughts (Recent)         No     Patient denies current homicidal ideation and behaviors.  Patient denies current self-injurious ideation and behaviors.    Patient denied risk behaviors associated with substance use.  Patient denies any high risk behaviors associated with mental health symptoms.  Patient reports the following current concerns for their personal safety: None.  Patient reports there are not firearms in the house.      History of Safety Concerns:  Patient denied a history of homicidal ideation.     Patient denied a history of personal safety concerns.    Patient denied a history of assaultive behaviors.    Patient denied a history of sexual assault behaviors.     Patient denied a history of risk behaviors associated with substance use.  Patient denies any history of high risk behaviors associated with mental health symptoms.  Patient reports the following protective factors: positive relationships  positive family connections, dedication to family/friends, safe and stable environment, regular physical activity, living with other people, daily obligations, structured day, committment to well-being, financial stability     Risk Plan:  None will create a Preliminary Treatment Plan for Safety and Risk Management Plan if symptoms emerge.     Review of Symptoms per patient report:  Depression:   osymptoms  Zoraida:             No Symptoms  Psychosis:     No Symptoms  Anxiety:            Anxiety, irritability / frustration - adjusting to recent onset of tinnitus.  Panic:             No symptoms  Post Traumatic Stress Disorder:  No Symptoms   Eating Disorder:        No Symptoms  ADD / ADHD:              No symptoms  Conduct Disorder:    No symptoms  Autism Spectrum Disorder: No symptoms  Obsessive Compulsive Disorder:   No Symptoms     Patient reports the following compulsive behaviors and treatment history: none.    Past dx of none.  Adjustment Disorder - adjusting to recent onset of tinnitus.  A. The development of emotional or behavioral symptoms in response to an identifiable stressor(s) occurring within 3 months of the onset of the stressor(s)  B. These symptoms or behaviors are clinically significant, as evidenced by one or both of the following:  C. The stress-related disturbance does not meet criteria for another disorder & is not not an exacerbation of another mental disorder  D. The symptoms do not represent normal bereavement  E. Once the stressor or its consequences have terminated, the symptoms do not persist for more than an additional 6 months       Adjustment Disorder with Mixed Anxiety and Depressed Mood: The predominant manifestation is a combination of depression and anxiety  Diagnostic Criteria:   - unwanted health and interpersonal changes   - Restlessness or feeling keyed up or on edge.    - Muscle tension.    Functional Status:  Patient reports the following functional impairments: mood disruption, stress related to tinnitus- recent onset    Functional Status:  Patient reports the following functional impairments: relationship(s).     WHODAS: see emr taken  at intake in  2020  Total Score        Some encounter information is confidential and restricted. Go to Review Flowsheets activity to see all data.        Clinical Summary:  1. Reason for assessment: change in functioning away from baseline.     past dx - none . Today adjustment d/o with anxiety.  2. Psychosocial, Cultural  and Contextual Factors: stress, mood changes  3. As evidenced by self report and criteria, client meets the following DSM5 Diagnoses:   (Sustained by DSM5 Criteria Listed Above) see symptoms list.             adjustment d/o with anxiety.  4.. Client strengths include:  Advocates for their health , commitment to health and well being, insight, intelligence and motivation.     Recommendations:      1. Plan for Safety and Risk Management:  Recommended that patient call 911 or go to the local ED should there be a change in any of these risk factors.    Report to child / adult protection services was NA.      2. Patient's identified no family history for mood disorder.     3. Initial Treatment will focus on: adjustment d/o with anxiety       4. Resources/Service Plan:                             services are not indicated.                 Modifications to assist communication are not indicated.                 Additional disability accommodations are not indicated.                 5. Collaboration:  Collaboration / coordination of treatment will be initiated with the following support professionals: pcp as needed.     6.  Referrals:  The following referral(s) will be initiated: none. Next Scheduled Appointment: 2-4 weeks.     A Release of Information has been obtained for the following: none     7. NAYELY: NAYELY:  none     8. Records were not available for review at time of assessment.  Information in this assessment was obtained from the medical record and provided by patient who is a good historian.     Patient will have open access to their mental health medical record.        Eval type:  Mental Health      Provider Name/ Credentials:  Elsie Howard MS/CRISTHIAN  December 3, 2020

## 2020-12-04 ASSESSMENT — ANXIETY QUESTIONNAIRES: GAD7 TOTAL SCORE: 10

## 2020-12-07 ENCOUNTER — MYC MEDICAL ADVICE (OUTPATIENT)
Dept: OTOLARYNGOLOGY | Facility: CLINIC | Age: 60
End: 2020-12-07

## 2020-12-07 DIAGNOSIS — R29.898 TIGHTNESS OF NECK: Primary | ICD-10-CM

## 2020-12-08 DIAGNOSIS — H93.13 TINNITUS, BILATERAL: Primary | ICD-10-CM

## 2020-12-10 ENCOUNTER — TELEPHONE (OUTPATIENT)
Dept: OTOLARYNGOLOGY | Facility: CLINIC | Age: 60
End: 2020-12-10

## 2020-12-10 DIAGNOSIS — H93.13 TINNITUS, BILATERAL: Primary | ICD-10-CM

## 2020-12-10 NOTE — TELEPHONE ENCOUNTER
Called and left a voicemail for patient.  I spoke with Dr. Robin Arteaga yesterday, whom Issa emailed and spoke on the phone with over the weekend.  Dr. Sandra mention that Issa had some concerns about why his surgery took so long.  In my message I for Issa to discuss this over the phone or at an appointment to go over what happened during his procedure.  In short, the initial placement of the stimulation cuff resulted in poor tongue motion.  We are not getting a movement forward as we typically like to see.  It took me an additional 2 more attempts to place the cuff in the proper position before we were able to get the tongue to move in a purely protrusion or forward manner.  I did briefly talk about this in my message.  Additionally I added that if the patient was wanting to consider antianxiety medications we would really need to have the patient see a psychiatrist as I do not have the expertise in order to manage these types of medication.  I also wanted to check in and see how he was doing from a tinnitus standpoint.

## 2020-12-31 ENCOUNTER — MYC MEDICAL ADVICE (OUTPATIENT)
Dept: OTOLARYNGOLOGY | Facility: CLINIC | Age: 60
End: 2020-12-31

## 2020-12-31 NOTE — TELEPHONE ENCOUNTER
Refill request from pharmacy for Contrave and topiramate. Refill denied at this time. Patient needs appointment. [employer] message sent to patient to schedule a virtual visit with Mary Childers PA-C.

## 2021-01-06 ENCOUNTER — VIRTUAL VISIT (OUTPATIENT)
Dept: ENDOCRINOLOGY | Facility: CLINIC | Age: 61
End: 2021-01-06
Payer: COMMERCIAL

## 2021-01-06 VITALS — HEIGHT: 72 IN | BODY MASS INDEX: 29.8 KG/M2 | WEIGHT: 220 LBS

## 2021-01-06 DIAGNOSIS — Z82.49 FAMILY HISTORY OF EARLY CAD: ICD-10-CM

## 2021-01-06 DIAGNOSIS — I10 BENIGN ESSENTIAL HYPERTENSION: ICD-10-CM

## 2021-01-06 DIAGNOSIS — Z86.39 HX OF OBESITY: Primary | ICD-10-CM

## 2021-01-06 DIAGNOSIS — E66.3 OVERWEIGHT (BMI 25.0-29.9): ICD-10-CM

## 2021-01-06 DIAGNOSIS — E78.5 HYPERLIPIDEMIA LDL GOAL <130: ICD-10-CM

## 2021-01-06 PROBLEM — E66.09 CLASS 1 OBESITY DUE TO EXCESS CALORIES WITHOUT SERIOUS COMORBIDITY WITH BODY MASS INDEX (BMI) OF 31.0 TO 31.9 IN ADULT: Chronic | Status: RESOLVED | Noted: 2020-07-06 | Resolved: 2021-01-06

## 2021-01-06 PROBLEM — E66.811 CLASS 1 OBESITY DUE TO EXCESS CALORIES WITHOUT SERIOUS COMORBIDITY WITH BODY MASS INDEX (BMI) OF 31.0 TO 31.9 IN ADULT: Chronic | Status: RESOLVED | Noted: 2020-07-06 | Resolved: 2021-01-06

## 2021-01-06 PROCEDURE — 99214 OFFICE O/P EST MOD 30 MIN: CPT | Mod: 95 | Performed by: PHYSICIAN ASSISTANT

## 2021-01-06 RX ORDER — NALTREXONE HYDROCHLORIDE AND BUPROPION HYDROCHLORIDE 8; 90 MG/1; MG/1
2 TABLET, EXTENDED RELEASE ORAL 2 TIMES DAILY
Qty: 120 TABLET | Refills: 3 | Status: SHIPPED | OUTPATIENT
Start: 2021-01-06 | End: 2021-02-17

## 2021-01-06 ASSESSMENT — PAIN SCALES - GENERAL: PAINLEVEL: NO PAIN (0)

## 2021-01-06 ASSESSMENT — MIFFLIN-ST. JEOR: SCORE: 1850.91

## 2021-01-06 NOTE — PROGRESS NOTES
Issa Carlson is a 60 year old who is being evaluated via a billable video visit.      How would you like to obtain your AVS? MyChart  If the video visit is dropped, the invitation should be resent by: Text to cell phone: cell  Will anyone else be joining your video visit? No    Video Start Time: 1130  Video-Visit Details    Type of service:  Video Visit    Video End Time: 1150    Originating Location (pt. Location): Home    Distant Location (provider location):  [facility] [place]     Platform used for Video Visit: [employer]      Return Medical Weight Management Note     Speedy Carlson  MRN:  [id number]  :  1960  CAMILO:  2021    Dear Physician No Ref-Primary,    I had the pleasure of seeing your patient Speedy Carlson. He is a 60 year old male who I am continuing to see for treatment of obesity related to:       2019   I have the following health issues associated with obesity: Heart Disease, High Blood Pressure, High Cholesterol, Sleep Apnea   I have the following symptoms associated with obesity: None of the above       Assessment & Plan   Problem List Items Addressed This Visit     Other    Family history of early CAD     -referral to [facility] for possible repeat calcium test requested by patient. Strong fam hx of early CAD.         Relevant Orders    REFERRAL TO [facility] FOR CARDIOVASCULAR DISEASE PREVN    Overweight (BMI 25.0-29.9)     BMI 29 now.  Hx of obesity         Hx of obesity - Primary     20: Has lost 32 lbs (12.8% TBW) and doing well on Contrave. Didn't tolerate Saxenda or topiramate.  -continue contrave  -took topiramate off med list  -Check vit D level (low in past) and A1C and consider GLP1 ozempic in future if preDM diagnosis.  -referral to [facility] for possible repeat calcium test requested by patient. Strong fam hx of early CAD.         Relevant Medications    naltrexone-bupropion (CONTRAVE) 8-90 MG per 12 hr tablet  "   Other Relevant Orders    Vitamin D Deficiency    Hemoglobin A1c    REFERRAL TO [facility] FOR CARDIOVASCULAR DISEASE PREVN                 Review of the result(s) of each unique test - Vit D 21 at last check 1/7/19, needs recheck not taking D regularly      35 minutes spent on the date of the encounter doing chart review, history and exam, documentation and further activities as noted above  0956}  MEDICATIONS:  Continue Contrave. Stopped topiramate due to irritability, took off med list.  Side effects with Saxenda in the past but may consider trial of Ozempic if A1C elevated in preDM range when checked.    FUTURE LABS:       - Schedule a fasting blood draw To find a lab location near you, please call [phone]. Check A1C and D    CONSULT: Referral to [facility] clinic for evaluation.  Strong family hx of CAD and had calcium scan in the past, wants repeat calcium scan possibly.        INTERVAL HISTORY:  Last visit 9/2/20 with me  Stopped taking topiramate on his own due to feeling \"tense\" and \"nervous\" and \"irritable\" it was working for food cravings.   Still taking Contrave  Saxenda causes stomach cramps  Avoided phentermine due to strong family history of CAD. Taking blood pressure meds more preventatively.  Stable at 220 lbs  Difficult over the holidays with eating but weight stable but not losing  Needs D recheck, lost in the past  Never had A1C checked    CURRENT WEIGHT:   220 lbs 0 oz    Initial Weight (lbs): 252.3 lbs  Last Visits Weight: 99.8 kg (220 lb)  Cumulative weight loss (lbs): 32.3  Weight Loss Percentage: 12.8%    Changes and Difficulties 1/6/2021   I have made the following changes to my diet since my last visit: -   With regards to my diet, I am still struggling with: Sugar   I have made the following changes to my activity/exercise since my last visit: No change   With regards to my activity/exercise, I am still struggling with: Making the time       VITALS:  Ht 1.829 m (6')  "  Wt 99.8 kg (220 lb)   BMI 29.84 kg/m      MEDICATIONS:   Current Outpatient Medications   Medication Sig Dispense Refill     albuterol (PROAIR HFA/PROVENTIL HFA/VENTOLIN HFA) 108 (90 Base) MCG/ACT inhaler Inhale 2 puffs into the lungs every 6 hours as needed for shortness of breath / dyspnea or wheezing       aspirin 81 MG tablet Take 81 mg by mouth daily       augmented betamethasone dipropionate (DIPROLENE) 0.05 % external gel Apply topically 2 times daily as needed       fenofibrate 145 MG tablet Take 145 mg by mouth daily        losartan (COZAAR) 50 MG tablet Take 50 mg by mouth daily        naltrexone-bupropion (CONTRAVE) 8-90 MG per 12 hr tablet Take 2 tablets by mouth 2 times daily 120 tablet 3     pravastatin (PRAVACHOL) 40 MG tablet Take 40 mg by mouth every morning        traZODone (DESYREL) 50 MG tablet Take 150 mg by mouth At Bedtime        Zolpidem Tartrate (AMBIEN PO) Take by mouth nightly as needed for sleep       bacitracin-neomycin-polymyxin (NEOSPORIN) 400-5-5000 external ointment Place a thin amount over each incision TID x 7 days (Patient not taking: Reported on 11/24/2020) 15 g 0     HYDROcodone-acetaminophen (NORCO) 5-325 MG tablet Take 1-2 tablets by mouth every 4 hours as needed for moderate to severe pain (Patient not taking: Reported on 11/24/2020) 25 tablet 0     senna-docusate (SENOKOT-S/PERICOLACE) 8.6-50 MG tablet Take 2 tablets by mouth daily as needed for constipation (While on oral opioids.) (Patient not taking: Reported on 11/24/2020) 10 tablet 0       Weight Loss Medication History Reviewed With Patient 1/6/2021   Which weight loss medications are you currently taking on a regular basis?  Contrave (naltrexone/bupropion), Topamax (topiramate)   Are you having any side effects from the weight loss medication that we have prescribed you? Yes   If you are having side effects please describe: Will advise on call           PHYSICAL EXAM:  Objective    Ht 1.829 m (6')   Wt 99.8 kg  (220 lb)   BMI 29.84 kg/m    Vitals - Patient Reported  Pain Score: No Pain (0)        Physical Exam   GENERAL: Healthy, alert and no distress  EYES: Eyes grossly normal to inspection.  No discharge or erythema, or obvious scleral/conjunctival abnormalities.  RESP: No audible wheeze, cough, or visible cyanosis.  No visible retractions or increased work of breathing.    SKIN: Visible skin clear. No significant rash, abnormal pigmentation or lesions.  NEURO: Cranial nerves grossly intact.  Mentation and speech appropriate for age.  PSYCH: Mentation appears normal, affect normal/bright, judgement and insight intact, normal speech and appearance well-groomed.      Sincerely,    Mary Childers PA-C

## 2021-01-06 NOTE — ASSESSMENT & PLAN NOTE
1/6/20: Has lost 32 lbs (12.8% TBW) and doing well on Contrave. Didn't tolerate Saxenda or topiramate.  -continue contrave  -took topiramate off med list  -Check vit D level (low in past) and A1C and consider GLP1 ozempic in future if preDM diagnosis.  -referral to [facility] for possible repeat calcium test requested by patient. Strong fam hx of early CAD.

## 2021-01-06 NOTE — NURSING NOTE
Chief Complaint   Patient presents with     Follow Up     MW follow up       Vitals:    01/06/21 1103   Weight: 99.8 kg (220 lb)   Height: 1.829 m (6')       Body mass index is 29.84 kg/m .

## 2021-01-06 NOTE — PATIENT INSTRUCTIONS
MEDICATIONS:  Continue Contrave. Stopped topiramate due to irritability, took off med list.  Side effects with Saxenda in the past but may consider trial of Ozempic if A1C elevated in preDM range when checked.    FUTURE LABS:       - Schedule a fasting blood draw To find a lab location near you, please call [phone]. Check A1C and D    CONSULT: Referral to [facility] clinic for evaluation.

## 2021-01-06 NOTE — ASSESSMENT & PLAN NOTE
-referral to [facility] for possible repeat calcium test requested by patient. Strong fam hx of early CAD.

## 2021-01-06 NOTE — LETTER
2021       RE: Speedy Carlson  [address] [facility] [place] [address]     Dear Colleague,    Thank you for referring your patient, Speedy Carlson, to the [facility]NAGEMENT [facility]IN[facility] [place] at [facility]. Please see a copy of my visit note below.    Return Medical Weight Management Note     Speedy Carlson  MRN:  [id number]  :  1960  CAMILO:  2021    Dear Physician No Ref-Primary,    I had the pleasure of seeing your patient Speedy Carlson. He is a 60 year old male who I am continuing to see for treatment of obesity related to:       2019   I have the following health issues associated with obesity: Heart Disease, High Blood Pressure, High Cholesterol, Sleep Apnea   I have the following symptoms associated with obesity: None of the above       Assessment & Plan   Problem List Items Addressed This Visit     Other    Family history of early CAD     -referral to [facility] for possible repeat calcium test requested by patient. Strong fam hx of early CAD.         Relevant Orders    REFERRAL TO [facility] FOR CARDIOVASCULAR DISEASE PREVN    Overweight (BMI 25.0-29.9)     BMI 29 now.  Hx of obesity         Hx of obesity - Primary     20: Has lost 32 lbs (12.8% TBW) and doing well on Contrave. Didn't tolerate Saxenda or topiramate.  -continue contrave  -took topiramate off med list  -Check vit D level (low in past) and A1C and consider GLP1 ozempic in future if preDM diagnosis.  -referral to [facility] for possible repeat calcium test requested by patient. Strong fam hx of early CAD.         Relevant Medications    naltrexone-bupropion (CONTRAVE) 8-90 MG per 12 hr tablet    Other Relevant Orders    Vitamin D Deficiency    Hemoglobin A1c    REFERRAL TO [facility] FOR CARDIOVASCULAR DISEASE PREVN                 Review of the result(s) of each unique test - Vit D 21 at last check 19, needs recheck not  "taking D regularly      35 minutes spent on the date of the encounter doing chart review, history and exam, documentation and further activities as noted above  0956}  MEDICATIONS:  Continue Contrave. Stopped topiramate due to irritability, took off med list.  Side effects with Saxenda in the past but may consider trial of Ozempic if A1C elevated in preDM range when checked.    FUTURE LABS:       - Schedule a fasting blood draw To find a lab location near you, please call [phone]. Check A1C and D    CONSULT: Referral to [facility] clinic for evaluation.  Strong family hx of CAD and had calcium scan in the past, wants repeat calcium scan possibly.        INTERVAL HISTORY:  Last visit 9/2/20 with me  Stopped taking topiramate on his own due to feeling \"tense\" and \"nervous\" and \"irritable\" it was working for food cravings.   Still taking Contrave  Saxenda causes stomach cramps  Avoided phentermine due to strong family history of CAD. Taking blood pressure meds more preventatively.  Stable at 220 lbs  Difficult over the holidays with eating but weight stable but not losing  Needs D recheck, lost in the past  Never had A1C checked    CURRENT WEIGHT:   220 lbs 0 oz    Initial Weight (lbs): 252.3 lbs  Last Visits Weight: 99.8 kg (220 lb)  Cumulative weight loss (lbs): 32.3  Weight Loss Percentage: 12.8%    Changes and Difficulties 1/6/2021   I have made the following changes to my diet since my last visit: -   With regards to my diet, I am still struggling with: Sugar   I have made the following changes to my activity/exercise since my last visit: No change   With regards to my activity/exercise, I am still struggling with: Making the time       VITALS:  Ht 1.829 m (6')   Wt 99.8 kg (220 lb)   BMI 29.84 kg/m      MEDICATIONS:   Current Outpatient Medications   Medication Sig Dispense Refill     albuterol (PROAIR HFA/PROVENTIL HFA/VENTOLIN HFA) 108 (90 Base) MCG/ACT inhaler Inhale 2 puffs into the lungs every " 6 hours as needed for shortness of breath / dyspnea or wheezing       aspirin 81 MG tablet Take 81 mg by mouth daily       augmented betamethasone dipropionate (DIPROLENE) 0.05 % external gel Apply topically 2 times daily as needed       fenofibrate 145 MG tablet Take 145 mg by mouth daily        losartan (COZAAR) 50 MG tablet Take 50 mg by mouth daily        naltrexone-bupropion (CONTRAVE) 8-90 MG per 12 hr tablet Take 2 tablets by mouth 2 times daily 120 tablet 3     pravastatin (PRAVACHOL) 40 MG tablet Take 40 mg by mouth every morning        traZODone (DESYREL) 50 MG tablet Take 150 mg by mouth At Bedtime        Zolpidem Tartrate (AMBIEN PO) Take by mouth nightly as needed for sleep       bacitracin-neomycin-polymyxin (NEOSPORIN) 400-5-5000 external ointment Place a thin amount over each incision TID x 7 days (Patient not taking: Reported on 11/24/2020) 15 g 0     HYDROcodone-acetaminophen (NORCO) 5-325 MG tablet Take 1-2 tablets by mouth every 4 hours as needed for moderate to severe pain (Patient not taking: Reported on 11/24/2020) 25 tablet 0     senna-docusate (SENOKOT-S/PERICOLACE) 8.6-50 MG tablet Take 2 tablets by mouth daily as needed for constipation (While on oral opioids.) (Patient not taking: Reported on 11/24/2020) 10 tablet 0       Weight Loss Medication History Reviewed With Patient 1/6/2021   Which weight loss medications are you currently taking on a regular basis?  Contrave (naltrexone/bupropion), Topamax (topiramate)   Are you having any side effects from the weight loss medication that we have prescribed you? Yes   If you are having side effects please describe: Will advise on call           PHYSICAL EXAM:  Objective    Ht 1.829 m (6')   Wt 99.8 kg (220 lb)   BMI 29.84 kg/m    Vitals - Patient Reported  Pain Score: No Pain (0)        Physical Exam   GENERAL: Healthy, alert and no distress  EYES: Eyes grossly normal to inspection.  No discharge or erythema, or obvious scleral/conjunctival  abnormalities.  RESP: No audible wheeze, cough, or visible cyanosis.  No visible retractions or increased work of breathing.    SKIN: Visible skin clear. No significant rash, abnormal pigmentation or lesions.  NEURO: Cranial nerves grossly intact.  Mentation and speech appropriate for age.  PSYCH: Mentation appears normal, affect normal/bright, judgement and insight intact, normal speech and appearance well-groomed.      Sincerely,    Mary Childers PA-C

## 2021-01-08 ENCOUNTER — VIRTUAL VISIT (OUTPATIENT)
Dept: PSYCHOLOGY | Facility: CLINIC | Age: 61
End: 2021-01-08
Payer: COMMERCIAL

## 2021-01-08 DIAGNOSIS — F43.22 ADJUSTMENT DISORDER WITH ANXIOUS MOOD: Primary | ICD-10-CM

## 2021-01-08 PROCEDURE — 90834 PSYTX W PT 45 MINUTES: CPT | Mod: GT | Performed by: PSYCHOLOGIST

## 2021-01-10 NOTE — PROGRESS NOTES
Progress Note    Patient Name: Speedy Carlson  Date: 2021         Service Type: Individual      Session Start Time: 11am  Session End Time: 11:50     Session Length: 45-50 min    Session #: 2    Attendees: Client    Service Modality:  Video Visit:      Provider verified identity through the following two step process.  Patient provided:  Patient  and Patient address    Telemedicine Visit: The patient's condition can be safely assessed and treated via synchronous audio and visual telemedicine encounter.      Reason for Telemedicine Visit: Services only offered telehealth    Originating Site (Patient Location): Patient's place of employment    Distant Site (Provider Location): Provider Remote Setting    Consent:  The patient/guardian has verbally consented to: the potential risks and benefits of telemedicine (video visit) versus in person care; bill my insurance or make self-payment for services provided; and responsibility for payment of non-covered services.     Patient would like the video invitation sent by: Text to cell phone: yes    Mode of Communication:  Video Conference via [email or web address]    As the provider I attest to compliance with applicable laws and regulations related to telemedicine.     Treatment Plan Last Reviewed: pend  PHQ-9 / ILIANA-7 : see emr    Cgi=4//4 at intake  [place] completed at intake.    Diagnosis:  1. Adjustment disorder with anxious mood        DATA  Interactive Complexity: No  Crisis: No       Progress Since Last Session (Related to Symptoms / Goals / Homework):   Symptoms: Improving .    Homework: Achieved / completed to satisfaction      Episode of Care Goals: Satisfactory progress - ACTION (Actively working towards change); Intervened by reinforcing change plan / affirming steps taken     Current / Ongoing Stressors and Concerns:   Anxiety    Tinnitus after a medical procedure.     Treatment Objective(s) Addressed in This  Session:   Decrease frequency and intensity of feeling anxious  Intervene with use of 3-5 new skills       Intervention:   Solution Focused: .      Taught EFT,    Constructed scripts, established curcuit. practiced 5- 6 rounds.  Client was successful with this.  Introduced fight flight and physiology of stress response patterns.  Also began breath work training      ASSESSMENT: Current Emotional / Mental Status (status of significant symptoms):   Risk status (Self / Other harm or suicidal ideation)   Patient denies current fears or concerns for personal safety.   Patient denies current or recent suicidal ideation or behaviors.   Patient denies current or recent homicidal ideation or behaviors.   Patient denies current or recent self injurious behavior or ideation.   Patient denies other safety concerns.   Patient reports there has been no change in risk factors since their last session.     Patient reports there has been no change in protective factors since their last session.     Recommended that patient call 911 or go to the local ED should there be a change in any of these risk factors.     Appearance:   Appropriate    Eye Contact:   Good    Psychomotor Behavior: Normal    Attitude:   Cooperative    Orientation:   All   Speech    Rate / Production: Normal     Volume:  Normal    Mood:    Normal   Affect:    Appropriate    Thought Content:  Clear    Thought Form:  Coherent  Logical    Insight:    Good      Medication Review:   No current psychiatric medications prescribed     Medication Compliance:   NA     Changes in Health Issues:   None reported     Chemical Use Review:   Substance Use: Chemical use reviewed, no active concerns identified      Tobacco Use: No current tobacco use.      Diagnosis:  1. Adjustment disorder with anxious mood        Collateral Reports Completed:   Routed note to PCP as needed    PLAN: (Patient Tasks / Therapist Tasks / Other)    Practice EFT 2 times a day   5-6 rounds.      Elsie  CRISTHIAN Howard                                                         ______________________________________________________________________    Treatment Plan    Patient's Name: Speedy Carlson  YOB: 1960        DSM5 Diagnoses: Adjustment Disorders  309.24 (F43.22) With anxiety  Psychosocial / Contextual Factors: post surgery - tinnitus  WHODAS: see emr    Referral / Collaboration:  Referral to another professional/service is not indicated at this time..    Anticipated number of session or this episode of care: 6      MeasurableTreatment Goal(s) related to diagnosis / functional impairment(s)  Goal 1: Patient will     I will know I've met my goal when.      Objective #A (Patient Action)    Patient will Decrease frequency and intensity of feeling anxious.  Status: New - Date: .     Intervention(s)  Therapist will teach EFT, breath work.      Patient has heard and agreed to the above plan.      Elsie Howard LP  January 9, 2021

## 2021-01-14 DIAGNOSIS — H93.13 TINNITUS, BILATERAL: ICD-10-CM

## 2021-01-14 DIAGNOSIS — Z86.39 HX OF OBESITY: ICD-10-CM

## 2021-01-14 LAB
ERYTHROCYTE [SEDIMENTATION RATE] IN BLOOD BY WESTERGREN METHOD: 3 MM/H (ref 0–20)
HBA1C MFR BLD: 5 % (ref 0–5.6)

## 2021-01-14 PROCEDURE — 86140 C-REACTIVE PROTEIN: CPT | Performed by: PHYSICIAN ASSISTANT

## 2021-01-14 PROCEDURE — 83036 HEMOGLOBIN GLYCOSYLATED A1C: CPT | Performed by: PHYSICIAN ASSISTANT

## 2021-01-14 PROCEDURE — 82306 VITAMIN D 25 HYDROXY: CPT | Performed by: PHYSICIAN ASSISTANT

## 2021-01-14 PROCEDURE — 36415 COLL VENOUS BLD VENIPUNCTURE: CPT | Performed by: PHYSICIAN ASSISTANT

## 2021-01-14 PROCEDURE — 85652 RBC SED RATE AUTOMATED: CPT | Performed by: PHYSICIAN ASSISTANT

## 2021-01-15 ENCOUNTER — HEALTH MAINTENANCE LETTER (OUTPATIENT)
Age: 61
End: 2021-01-15

## 2021-01-15 LAB
CRP SERPL-MCNC: <2.9 MG/L (ref 0–8)
DEPRECATED CALCIDIOL+CALCIFEROL SERPL-MC: 30 UG/L (ref 20–75)

## 2021-01-19 ENCOUNTER — TELEPHONE (OUTPATIENT)
Dept: ENDOCRINOLOGY | Facility: CLINIC | Age: 61
End: 2021-01-19

## 2021-01-19 DIAGNOSIS — G47.33 OSA (OBSTRUCTIVE SLEEP APNEA): Chronic | ICD-10-CM

## 2021-01-19 DIAGNOSIS — E78.5 HYPERLIPIDEMIA LDL GOAL <130: ICD-10-CM

## 2021-01-19 DIAGNOSIS — E66.811 CLASS 1 OBESITY DUE TO EXCESS CALORIES WITH SERIOUS COMORBIDITY AND BODY MASS INDEX (BMI) OF 34.0 TO 34.9 IN ADULT: Primary | ICD-10-CM

## 2021-01-19 DIAGNOSIS — E66.09 CLASS 1 OBESITY DUE TO EXCESS CALORIES WITH SERIOUS COMORBIDITY AND BODY MASS INDEX (BMI) OF 34.0 TO 34.9 IN ADULT: Primary | ICD-10-CM

## 2021-01-19 DIAGNOSIS — I10 BENIGN ESSENTIAL HYPERTENSION: ICD-10-CM

## 2021-01-19 RX ORDER — SEMAGLUTIDE 1.34 MG/ML
INJECTION, SOLUTION SUBCUTANEOUS
Qty: 1.5 ML | Refills: 3 | Status: SHIPPED | OUTPATIENT
Start: 2021-01-19 | End: 2021-04-06

## 2021-01-19 NOTE — TELEPHONE ENCOUNTER
Prior Authorization Retail Medication Request    Medication/Dose: Ozempic  ICD code (if different than what is on RX):    Class 1 obesity due to excess calories with serious comorbidity and body mass index (BMI) of 34.0 to 34.9 in adult [E66.09, Z68.34]  - Primary       Benign essential hypertension [I10]       Hyperlipidemia LDL goal <130 [E78.5]       HANNAH (obstructive sleep apnea)- severe (AHI 52) [G47.33]           Previously Tried and Failed:  Topiramate, Saxenda, history of diet and exercise  Rationale:  He is a 60 year old male who I am continuing to see for treatment of obesity related to: Heart Disease, High Blood Pressure, High Cholesterol, Sleep Apnea. Didn't tolerate Saxenda or topiramate. Continue contrave.    Insurance Name:    Insurance ID:        Pharmacy Information (if different than what is on RX)  Name:  [id number] - [place], [place] - [address] [facility] [address]  Phone: [phone]

## 2021-01-19 NOTE — TELEPHONE ENCOUNTER
Visit with Mary Childers PA-C on 1/6/21. Office note states:    MEDICATIONS:  Continue Contrave. Stopped topiramate due to irritability, took off med list.  Side effects with Saxenda in the past but may consider trial of Ozempic if A1C elevated in preDM range when checked.    Mary is out on SHILPA, spoke with Shaila Wooten CNP in regard to medication start. Per Shaila Wooten, will submit rx and start PA Patient notified of plan.

## 2021-01-20 ENCOUNTER — OFFICE VISIT (OUTPATIENT)
Dept: SLEEP MEDICINE | Facility: CLINIC | Age: 61
End: 2021-01-20
Payer: COMMERCIAL

## 2021-01-20 VITALS
HEART RATE: 83 BPM | DIASTOLIC BLOOD PRESSURE: 83 MMHG | OXYGEN SATURATION: 97 % | RESPIRATION RATE: 16 BRPM | BODY MASS INDEX: 29.8 KG/M2 | WEIGHT: 220 LBS | SYSTOLIC BLOOD PRESSURE: 125 MMHG | HEIGHT: 72 IN

## 2021-01-20 DIAGNOSIS — G47.33 OSA (OBSTRUCTIVE SLEEP APNEA): Primary | ICD-10-CM

## 2021-01-20 PROCEDURE — 99214 OFFICE O/P EST MOD 30 MIN: CPT | Performed by: INTERNAL MEDICINE

## 2021-01-20 ASSESSMENT — MIFFLIN-ST. JEOR: SCORE: 1851.04

## 2021-01-20 NOTE — PATIENT INSTRUCTIONS
Your BMI is Body mass index is 29.83 kg/m .  Weight management is a personal decision.  If you are interested in exploring weight loss strategies, the following discussion covers the approaches that may be successful. Body mass index (BMI) is one way to tell whether you are at a healthy weight, overweight, or obese. It measures your weight in relation to your height.  A BMI of 18.5 to 24.9 is in the healthy range. A person with a BMI of 25 to 29.9 is considered overweight, and someone with a BMI of 30 or greater is considered obese. More than two-thirds of American adults are considered overweight or obese.  Being overweight or obese increases the risk for further weight gain. Excess weight may lead to heart disease and diabetes.  Creating and following plans for healthy eating and physical activity may help you improve your health.  Weight control is part of healthy lifestyle and includes exercise, emotional health, and healthy eating habits. Careful eating habits lifelong are the mainstay of weight control. Though there are significant health benefits from weight loss, long-term weight loss with diet alone may be very difficult to achieve- studies show long-term success with dietary management in less than 10% of people. Attaining a healthy weight may be especially difficult to achieve in those with severe obesity. In some cases, medications, devices and surgical management might be considered.  What can you do?  If you are overweight or obese and are interested in methods for weight loss, you should discuss this with your provider.     Consider reducing daily calorie intake by 500 calories.     Keep a food journal.     Avoiding skipping meals, consider cutting portions instead.    Diet combined with exercise helps maintain muscle while optimizing fat loss. Strength training is particularly important for building and maintaining muscle mass. Exercise helps reduce stress, increase energy, and improves fitness.  Increasing exercise without diet control, however, may not burn enough calories to loose weight.       Start walking three days a week 10-20 minutes at a time    Work towards walking thirty minutes five days a week     Eventually, increase the speed of your walking for 1-2 minutes at time    In addition, we recommend that you review healthy lifestyles and methods for weight loss available through the National Institutes of Health patient information sites:  http://win.niddk.nih.gov/publications/index.htm    And look into health and wellness programs that may be available through your health insurance provider, employer, local community center, or autumn club.    Weight management plan: Patient was referred to their PCP to discuss a diet and exercise plan.    Drive Safe... Drive Alive     Sleep health profoundly affects your health, mood, and your safety. 33% of the population (one in three of us) is not getting enough sleep and many have a sleep disorder. Not getting enough sleep or having an untreated / undertreated sleep condition may make us sleepy without even knowing it. In fact, our driving could be dramatically impaired due to our sleep health. As your provider, here are some things I would like you to know about driving:     Here are some warning signs for impairment and dangerous drowsy driving:              -Having been awake more than 16 hours               -Looking tired               -Eyelid drooping              -Head nodding (it could be too late at this point)              -Driving for more than 30 minutes     Some things you could do to make the driving safer if you are experiencing some drowsiness:              -Stop driving and rest              -Call for transportation              -Make sure your sleep disorder is adequately treated     Some things that have been shown NOT to work when experiencing drowsiness while driving:              -Turning on the radio              -Opening windows               -Eating any  distracting  /  entertaining  foods (e.g., sunflower seeds, candy, or any other)              -Talking on the phone      Your decision may not only impact your life, but also the life of others. Please, remember to drive safe for yourself and all of us.    Ayaz Toledo MD

## 2021-01-20 NOTE — PROGRESS NOTES
SLEEP MEDICINE CLINIC NOTE   [facility]  Speedy Carlson 60 year old male  : 1960  MRN: [id number]      PRIMARY CARE PROVIDER: Ashlee Ref-Primary, Physician    Visit Date:   2021      REASON FOR VISIT:  Followup of obstructive sleep apnea.      HISTORY OF PRESENT ILLNESS:  The patient is a very pleasant 60-year-old gentleman with history of severe obstructive sleep apnea diagnosed in 2019 with a home sleep apnea test that showed apnea-hypopnea index of 52 events per hour and prema oxygen saturation of 72% with loud snoring.  The patient had been intolerant to CPAP and recently underwent hypoglossal nerve stimulator placement for treatment of obstructive sleep apnea.  He comes in today for activation of the device.  The device was placed on 10/28/2020, but had not been activated as the patient developed significant bilateral tinnitus for which the activation was delayed.      His other comorbidities include hypertension, hyperlipidemia.      The patient describes his routine going to bed around 11 p.m.  He usually does not have any difficulty initiating sleep.  He reports waking up twice through the night, either around 3 a.m. or at 5:30 a.m.  Sometimes it takes him quite a bit of time to return to sleep.  He wakes up spontaneously at 7 a.m.  He generally feels rested upon awakening.  He denies any daytime fatigue or excessive daytime sleepiness.  He has been following a consistent schedule lately; however, in the past, on weekends, he would go to sleep later and wake up later in the morning.      The patient reports that his primary motivation for treatment of obstructive sleep apnea is to improve his wife's sleep and to reduce his risk factor for coronary artery disease as he has very strong family history of this condition.  He himself does not have any daytime or nighttime symptoms that can be attributed to obstructive sleep apnea.      ASSESSMENT AND PLAN:    1. Severe  obstructive sleep apnea:  The patient's HST from 2019 was reviewed.  This showed AHI of 54 events per hour.  The patient's hypoglossal nerve stimulator device was activated with a sensation threshold of 1.8 volts and a functional threshold of 2.2 volts.  He was provided with a self-titrating range of 2.2-3.2 volts.  He was provided with a 30-minute start delay and a 15-minute pause time with a therapy duration of 8 hours.  Electrode configuration was positive-negative-positive.      We will order a polysomnography for titration of upper airway stimulator device in approximately 6 weeks, and the patient will return after that to discuss results.      He was advised not to drive or operate heavy machinery if drowsy or sleepy.  He was counseled regarding the importance of maintaining a healthy weight.        2. Tinnitus: Patient has bilateral tinnitus since HNS implantation. It is unlikely to be related to the implant. Will refer him to ENT for further evaluation.      CHICHO MCKEON MD             D: 2021   T: 2021   MT: ADRIANA      Name:     SIRISHA WU   MRN:      [id number]        Account:      [id number]   :      1960           Visit Date:   2021      Document: [id number]

## 2021-01-20 NOTE — TELEPHONE ENCOUNTER
Central Prior Authorization Team   Phone: [phone]      PA Initiation    Medication: Ozempic-PA initiated  Insurance Company: [employer] - Phone [phone] Fax [phone]  Pharmacy Filling the Rx: [id number] [place], [place] - [address] AT [facility] [address]  Filling Pharmacy Phone: [phone]  Filling Pharmacy Fax:    Start Date: 1/20/2021

## 2021-01-21 ENCOUNTER — OFFICE VISIT (OUTPATIENT)
Dept: CARDIOLOGY | Facility: CLINIC | Age: 61
End: 2021-01-21
Attending: PHYSICIAN ASSISTANT
Payer: COMMERCIAL

## 2021-01-21 VITALS
DIASTOLIC BLOOD PRESSURE: 80 MMHG | BODY MASS INDEX: 31.08 KG/M2 | HEIGHT: 72 IN | SYSTOLIC BLOOD PRESSURE: 128 MMHG | OXYGEN SATURATION: 97 % | WEIGHT: 229.5 LBS | HEART RATE: 86 BPM

## 2021-01-21 DIAGNOSIS — I25.10 CORONARY ARTERY DISEASE DUE TO LIPID RICH PLAQUE: ICD-10-CM

## 2021-01-21 DIAGNOSIS — E78.5 HYPERLIPIDEMIA LDL GOAL <130: Chronic | ICD-10-CM

## 2021-01-21 DIAGNOSIS — I10 ESSENTIAL HYPERTENSION: Chronic | ICD-10-CM

## 2021-01-21 DIAGNOSIS — I25.83 CORONARY ARTERY DISEASE DUE TO LIPID RICH PLAQUE: ICD-10-CM

## 2021-01-21 DIAGNOSIS — E78.5 HYPERLIPIDEMIA LDL GOAL <130: Primary | Chronic | ICD-10-CM

## 2021-01-21 LAB
CREAT UR-MCNC: 96 MG/DL
MICROALBUMIN UR-MCNC: <5 MG/L
MICROALBUMIN/CREAT UR: NORMAL MG/G CR (ref 0–17)
NT-PROBNP SERPL-MCNC: 15 PG/ML (ref 0–125)

## 2021-01-21 PROCEDURE — 82043 UR ALBUMIN QUANTITATIVE: CPT | Performed by: PATHOLOGY

## 2021-01-21 PROCEDURE — 99215 OFFICE O/P EST HI 40 MIN: CPT | Performed by: NURSE PRACTITIONER

## 2021-01-21 PROCEDURE — 36415 COLL VENOUS BLD VENIPUNCTURE: CPT | Performed by: PATHOLOGY

## 2021-01-21 PROCEDURE — 83880 ASSAY OF NATRIURETIC PEPTIDE: CPT | Performed by: PATHOLOGY

## 2021-01-21 RX ORDER — ZOLPIDEM TARTRATE 5 MG/1
5 TABLET ORAL
COMMUNITY
Start: 2020-12-22

## 2021-01-21 RX ORDER — LOSARTAN POTASSIUM 50 MG/1
50 TABLET ORAL DAILY
Refills: 0 | COMMUNITY
Start: 2021-01-21 | End: 2022-04-07

## 2021-01-21 RX ORDER — PRAVASTATIN SODIUM 40 MG
40 TABLET ORAL EVERY MORNING
COMMUNITY
Start: 2020-08-26

## 2021-01-21 RX ORDER — FENOFIBRATE 145 MG/1
145 TABLET, COATED ORAL
COMMUNITY
Start: 2020-02-25 | End: 2022-04-07

## 2021-01-21 RX ORDER — ALBUTEROL SULFATE 90 UG/1
1-2 AEROSOL, METERED RESPIRATORY (INHALATION) EVERY 4 HOURS PRN
COMMUNITY
Start: 2019-01-28 | End: 2024-01-30

## 2021-01-21 RX ORDER — TRAZODONE HYDROCHLORIDE 50 MG/1
150 TABLET, FILM COATED ORAL AT BEDTIME
COMMUNITY
Start: 2020-10-22

## 2021-01-21 RX ORDER — BETAMETHASONE DIPROPIONATE 0.05 %
GEL (GRAM) TOPICAL
COMMUNITY
Start: 2019-04-17 | End: 2021-02-17

## 2021-01-21 ASSESSMENT — MIFFLIN-ST. JEOR: SCORE: 1894.01

## 2021-01-21 NOTE — LETTER
1/21/2021      RE: Speedy Carlson  [address] [facility] [place] [address]       Dear Colleague,    Thank you for the opportunity to participate in the care of your patient, Speedy Carlson, at the [facility] FOR CARDIOVASCULAR DISEASE PREVENTION [facility]. Please see a copy of my visit note below.       [facility] Cardiovascular Disease Prevention - Exam Note    Active Problems   Patient Active Problem List    Diagnosis Date Noted     Coronary artery disease due to lipid rich plaque      Priority: Medium     Overweight (BMI 25.0-29.9) 01/06/2021     Priority: Medium     Hx of obesity 01/06/2021     Priority: Medium     Working with medical weight management  Started at weight 252 lbs       HANNAH (obstructive sleep apnea)- severe (AHI 52) 02/26/2020     Priority: Medium     Home Sleep Study: 04/16/2019 AHI 52, RDI/DEMETRIUS of 52 per hour. The low O2 sat is 72%. This is associated with continuous loud snoring and spiking desaturation and what might be a REM pattern.       Hypovitaminosis D 01/24/2019     Priority: Medium     Insomnia 01/24/2019     Priority: Medium     Tinnitus 01/24/2019     Priority: Medium     Family history of early CAD 04/26/2016     Priority: Medium     ED (erectile dysfunction) 02/09/2015     Priority: Medium     Hypertension 02/09/2015     Priority: Medium     Advance directive on file 08/20/2014     Priority: Medium     See scanned form - dated 5/7/2008       Seborrhea capitis 05/31/2011     Priority: Medium     Hyperlipidemia LDL goal <130 10/31/2010     Priority: Medium     Tear film insufficiency 03/06/2006     Priority: Medium     Other acne 01/28/2004     Priority: Medium       Reason For Visit   Patient here for [facility] early detection of atherosclerosis and CVD exam.    Pain Evaluation  Current history of pain associated with this visit is: denied    HPI   Speedy YUAN Carlson is a 60  year old year old male with a history of CAD with a coronary artery score of 22 with a 63 percentile, obesity, hyupertension, hyperlipidemia, sleep apnea s/p implant of Inspra 10/20, an erectile dysfunction. He has been taking a statin medication for the past 10 years. He was started on simvastatin but he developed muscle weakness.  His cardiac risk factors include:  Elevated BMI, hypertension, and lack of exercise. Today in clinic he denies chest pain/pressure at rest, with exercise or while sleeping, SOB at rest, with exercise or while sleeping, palpitations, lightheadedness, syncope, lower leg edema, heartburn, HAs or memory issues.  He is referred by the weight loss clinic, [facility].  His primary care provider is Steve Mcmahon at [facility].    He has a prescription to start Ozempic but he has not started this medication yet.  He states that he had a stress test 10 years ago and the result was negative but I cannot find those results.      The 10-year ASCVD risk score (Simsalexander PARADA Jr., et al., 2013) is: 10.7%    Values used to calculate the score:      Age: 60 years      Sex: Male      Is Non- : No      Diabetic: no      Tobacco smoker: No      Systolic Blood Pressure: 128 mmHg      Is BP treated: yes      HDL Cholesterol: 33 mg/dL      Total Cholesterol: 142 mg/dL     Nutrition assessment per patient report:   Foods with fat/cholesterol (fried foods, fatty meats, junk food):  0 servings   Fruits and vegetables (  cup cooked, 1 cup raw):  1 serving of vegetables/day, 1 serving of fruit/day  Caffeine (1 cup coffee, soda, etc):  3-4 ounces/day  Alcohol servings (12 oz. beer, 4 oz. wine, 1  oz. in mixed drink):  decreased with COVID, only drinks socially  Calcium servings (dairy foods, 8 oz. milk, yogurt, cheese, ice cream):  none, he does not like milk  Salt/sodium use:  moderate  Special dietary habits:  None      Activity  Patient is active 3-4 times per week for 30  minutes  or more minutes with walking. He used to be a runner and walk/run on a treadmill at the health club before COVID19.    Laboratory Results Review  We discussed laboratory results today including lipids targets and how foods influence cholesterol.    Weight  His perceived healthy weight is ,200 pounds.  A normal BMI of 25 is equal to 184 pounds.  The current BMI of 31.13 is high-risk range.  A weight reduction speed of 2-3 lbs per month for men is recommended.    PMH   Past Medical History:   Diagnosis Date     Acute prostatitis 12/2004     Calculus of kidney 1994    no recurrence     Cervical radiculopathy 10/4/2016    R side     Class 1 obesity due to excess calories without serious comorbidity with body mass index (BMI) of 31.0 to 31.9 in adult 7/6/2020     Dry eye syndrome      Hypertension      Idiopathic urticaria      Impotence of organic origin      Insomnia      Mild intermittent asthma 1975    hx in childhood     Mixed hyperlipidemia      Obesity      HANNAH (obstructive sleep apnea)      Other acne      Prostatitis      Seborrhea capitis      Tinnitus      Varicella without mention of complication        PSH  Past Surgical History:   Procedure Laterality Date     COLONOSCOPY  2012     DENTAL SURGERY  1980    wisdom teeth     ENDOSCOPY DRUG INDUCED SLEEP N/A 6/3/2020    Procedure: DRUG-INDUCED SLEEP ENDOSCOPY;  Surgeon: Gladys Marroquin MD;  Location:  OR     IMPLANT GENERATOR STIMULATOR (LOCATION) Right 10/28/2020    Procedure: INSERTION, PULSE GENERATOR, NEUROSTIMULATOR;  Surgeon: Gladys Marroquin MD;  Location: [facility] [place] [place]     PHOTOREFRACTIVE KERATECTOMY  2000    s/p Lasik surgery     TONSILLECTOMY  1971    s/p Tonsillectomy       Current Meds   Current Outpatient Medications   Medication Sig Dispense Refill     albuterol (PROAIR HFA/PROVENTIL HFA/VENTOLIN HFA) 108 (90 Base) MCG/ACT inhaler Inhale 1-2 puffs into the lungs       augmented betamethasone dipropionate (DIPROLENE) 0.05 %  external gel APPLY SPARINGLY AND INFREQUENTLY TO RASH AS NEEDED       fenofibrate (TRICOR) 145 MG tablet Take 145 mg by mouth       losartan (COZAAR) 50 MG tablet Take 1 tablet (50 mg) by mouth daily  0     pravastatin (PRAVACHOL) 40 MG tablet        traZODone (DESYREL) 50 MG tablet TAKE 3 TABLETS BY MOUTH AT BEDTIME FOR SLEEP       aspirin 81 MG tablet Take 81 mg by mouth daily       naltrexone-bupropion (CONTRAVE) 8-90 MG per 12 hr tablet Take 2 tablets by mouth 2 times daily 120 tablet 3     Semaglutide,0.25 or 0.5MG/DOS, (OZEMPIC, 0.25 OR 0.5 MG/DOSE,) 2 MG/1.5ML SOPN For the first 4 weeks inject 0.25mg weekly. Then increase to 0.5mg weekly thereafter. 1.5 mL 3     zolpidem (AMBIEN) 5 MG tablet TAKE 1 TABLET BY MOUTH AT BEDTIME AS NEEDED       Zolpidem Tartrate (AMBIEN PO) Take by mouth nightly as needed for sleep         Allergies      Allergies   Allergen Reactions     Liraglutide GI Disturbance     Saxenda      Vitamin D3 [Cholecalciferol] Rash     Hmg-Coa-R Inhibitors      Myalgias      Statins       Azithromycin Rash     Skin peeling       Family Hx   Family History   Problem Relation Age of Onset     Cancer Father         lung, smoker     Breast Cancer Mother         at age  45     Depression Mother      Obesity Mother      Eye Disorder Mother         cataracts     Hyperlipidemia Mother      Hypertension Mother      Coronary Artery Disease Mother         First MI betweeen 60 and 66 year of age, 3 MIs in past 15 years     Pulmonary Embolism Mother         on Eliquis     Coronary Artery Disease Brother         MI at age 30, pericarditis then MI     Coronary Artery Disease Brother         CABG x 4 at age 48, smoker     Hyperlipidemia Brother      Obesity Brother         Has lost over 100 pounds       Social History  Speedy is  and is working full time. His job is active.  He is  with step children.     Enjoyment of life is 10 with 10 enjoys life fully.    Tobacco History  History    Smoking Status     Never Smoker   Smokeless Tobacco     Never Used     Comment: cigar occ       ROS  CONSTITUTIONAL:  No fever, chills, or sweats. No weight gain/loss.   EENT:  No visual disturbance, ear ache, epistaxis, sore throat  ALLERGIES/IMMUNOLOGIC:  Negative  RESPIRATORY:  No cough, hemoptysis  CARDIOVASCULAR:  As per HPI  GI:  No nausea, vomiting, hematemesis, melena  GI:  negative  :  No urinary frequency, dysuria, or hematuria  INTEGUMENT:  Negative  PSYCHIATRIC:  Negative  NEURO:  Negative  ENDOCRINE:  Negative  MUSCULOSKELETAL:  Negative     Vital Signs   /80 (BP Location: Right arm, Patient Position: Sitting, Cuff Size: Adult Large)   Pulse 86   Ht 1.829 m (6')   Wt 104.1 kg (229 lb 8 oz)   SpO2 97%   BMI 31.13 kg/m        Waist: 44.5 inches  Hip: 44 inches    Physical Exam   In general, the patient is a pleasant male in no apparent distress.    HEENT: NC/AT.  PERRLA.  EOMI.  Sclerae white, not injected.  Nares clear.  Pharynx without erythema or exudate.  Dentition intact.    Neck: No adenopathy.  No thyromegaly.Carotids +4/4 bilaterally without bruits.  No jugular venous distension.   Lungs: Breath sounds clear reynaldo without cracklers, ronchi, wheezes     Cor: RRR. S1S2 splits without murmur, rub, click, or gallop. The PMI is in the 5th ICS in the midclavicular line.  Abdomen: Soft, nontender, nondistended. BS + in all quadrants, no hepatoomegaly.  No aorta or renal artery bruits.   Extremities: No clubbing, cyanosis, or edema. DP and PT pulses are +2/2 at the post-tibial sites bilaterally    Recent Labs   LIPIDS  Component Name  10/22/2020 2/3/2020 4/5/2019 1/7/2019 1/12/2018 2/22/2017 12/2/2016 3/10/2016 2/9/2015 12/11/2013 12/5/2012 3/7/2012 11/21/2011 4/25/2011 12/10/2010     142 136 187 217 (H) 183 143 215 (H) 218 (H) 205 (H) 227 (H) 222 (H) 184 230 (H) 184 149   62 <45 167 (H) 147 92 77 111 166 (H) 197 (H) 154 (H) 105 114 276 (H) 191 (H) 143   33 27 (L) 34 (L) 39 (L) 39 (L) 33 (L)  40 (L) 41 38 (L) 40 (L) 43 35 (L) 40 (L) 38 (L) 35 (L)   109 109 153 (H) 178 (H) 144 110 175 (H) 177 (H) 167 (H) 187 179           4.30 5.04 (H) 5.50 (H) 5.56 (H) 4.69 (H) 4.33 5.38 (H) 5.32 (H) 5.39 (H) 5.68 (H) 5.16 (H) 5.26 (H) 5.75 (H) 4.84 (H) 4.26   97   120 149 (H) 126 95 153 (H) 144 (H) 128 156 (H) 158 (H) 126 135 (H) 108 85           RANDOM                                                Lab Results   Component Value Date    GLC 97 08/12/2014      Lab Results   Component Value Date    NTBNP 15 01/21/2021     No results found for: NTBNPI   Lab Results   Component Value Date    UCRR 96 01/21/2021      Lab Results   Component Value Date    MICROL <5 01/21/2021      No results found for: MICROALBUMIN   Lab Results   Component Value Date    CRP <2.9 01/14/2021      Lab Results   Component Value Date    CHOL 140 11/13/2007      Lab Results   Component Value Date    TRIG 123 11/13/2007      Lab Results   Component Value Date    HDL 36 (L) 11/13/2007      Lab Results   Component Value Date    LDL 80 11/13/2007      Lab Results   Component Value Date    VLDL 25 11/13/2007      Lab Results   Component Value Date    CHOLHDLRATIO 3.9 11/13/2007     No results found for: NHDL      5/17/2016    CONCLUSIONS:  1. Total Agatston calcium score is 22. Although the absolute score is low,   this amount of coronary artery calcification places this individual at the   63rd percentile when compared to an age and gender race matched control   group and implies an overall intermediate risk of cardiac events in the   next 10 years.   2. The presence of any coronary artery calcification denotes the presence   of coronary artery disease.   3.  Please review radiology portion for incidental noncardiac findings.      Simmons Test Results    WALKING BLOOD PRESSURE RESPONSE (3 minute, 5 MET level walk)   Pre BP: 130/70 mmHg  3 min BP: 180/60 mmHg  1 min post BP: 150/70 mmHg    Pre HR: 80 bpm  3 min HR: 129 bpm  1 min post HR: 99 bpm      ABDOMINAL AORTA ULTRASOUND (< 2.5 normal, borderline 2.5-2.9, abnormal > 3)   SupraIliac 2.05 cm    SupraRenal 2.04 cm    InfraRenal Proximal 1.84 cm    InfraRenal Distal 1.94 cm      Abdominal Aorta Assessment:  normal      LEFT VENTRICULAR ULTRASOUND MEASUREMENTS (adjusted for BSA)  LVIDD 44.5 mm   Septa 10.4 mm   Posterior 9.0 mm     Left Ventricular US Assessment:  normal       Carotid Artery IMT measurements report and plaques in the small area examined:   Left IMT 1.064 mm  Plaques none    Right IMT   0.858 mm  Plaques none       ECG (see tracing):  Normal sinus rhythm;  rate: 86 bpm      Arterial Elasticity per age and gender (see printout):   C1 11.4 mL/mmHg x 10  normal   C2 5.9 mL/mmHg x 100 normal   Supine blood pressure: 148/86 mmHg       Assessment:     Cardiovascular: Asymtpomatic, currenly not complaining of chest pain. He was interested in repeating his coronary artery calcium score-we did not see any benefit in repeating this exam    Blood Pressure:  BP well controlled in clinic, currently on losartan 50 mg every day    Lipids:  10/20 /Trig 62/HDL 33/LDL 97, currently on 60 mg of pravachol and 145 mg fenofibrate, continue current plan  He also had questions about Vascepa, we do not recommend adding that medication at this time, Trig level is 62    Glucose:  97    Return to Clinic:  3-5 years    Health Habit Summary:  Nutrition: Heart Healthy Eating:  some of the time   Exercise:  regularly active  Weight:  high-risk range  Tobacco Use:  never used    Full report to follow prevention team review of test results with scanned final report.    Time spent for patient visit was 60 minutes with more than half the time spent on counseling and coordination of care.    RORO Mckeon CNP       CC  Patient Care Team:  No Ref-Primary, Physician as PCP - General  Bloch, Lauren Turner, [facility] as Pharmacist (Pharmacist)  Gladys Marroquin MD as MD (Otolaryngology)  Trey Combs MD as Assigned  Sleep Provider  Gladys Marroquin MD as Assigned Surgical Provider  Ayaz Toledo MD as Assigned Pulmonology Provider  NATALIE CUNHA      Please do not hesitate to contact me if you have any questions/concerns.     Sincerely,     RORO Mckeon CNP

## 2021-01-21 NOTE — PROGRESS NOTES
[facility] - Exam Note    Active Problems   Patient Active Problem List    Diagnosis Date Noted     Coronary artery disease due to lipid rich plaque      Priority: Medium     Overweight (BMI 25.0-29.9) 01/06/2021     Priority: Medium     Hx of obesity 01/06/2021     Priority: Medium     Working with medical weight management  Started at weight 252 lbs       HANNAH (obstructive sleep apnea)- severe (AHI 52) 02/26/2020     Priority: Medium     Home Sleep Study: 04/16/2019 AHI 52, RDI/DEMETRIUS of 52 per hour. The low O2 sat is 72%. This is associated with continuous loud snoring and spiking desaturation and what might be a REM pattern.       Hypovitaminosis D 01/24/2019     Priority: Medium     Insomnia 01/24/2019     Priority: Medium     Tinnitus 01/24/2019     Priority: Medium     Family history of early CAD 04/26/2016     Priority: Medium     ED (erectile dysfunction) 02/09/2015     Priority: Medium     Hypertension 02/09/2015     Priority: Medium     Advance directive on file 08/20/2014     Priority: Medium     See scanned form - dated 5/7/2008       Seborrhea capitis 05/31/2011     Priority: Medium     Hyperlipidemia LDL goal <130 10/31/2010     Priority: Medium     Tear film insufficiency 03/06/2006     Priority: Medium     Other acne 01/28/2004     Priority: Medium       Reason For Visit   Patient here for [facility] early detection of atherosclerosis and CVD exam.    Pain Evaluation  Current history of pain associated with this visit is: denied    HPI   Speedy Carlson is a 60 year old year old male with a history of CAD with a coronary artery score of 22 with a 63 percentile, obesity, hyupertension, hyperlipidemia, sleep apnea s/p implant of Inspra 10/20, an erectile dysfunction. He has been taking a statin medication for the past 10 years. He was started on simvastatin but he developed muscle weakness.  His cardiac risk factors include:  Elevated BMI, hypertension, and  lack of exercise. Today in clinic he denies chest pain/pressure at rest, with exercise or while sleeping, SOB at rest, with exercise or while sleeping, palpitations, lightheadedness, syncope, lower leg edema, heartburn, HAs or memory issues.  He is referred by the weight loss clinic, [facility].  His primary care provider is Steve Mcmahon at [facility].    He has a prescription to start Ozempic but he has not started this medication yet.  He states that he had a stress test 10 years ago and the result was negative but I cannot find those results.      The 10-year ASCVD risk score (Ednaalexander PARADA Jr., et al., 2013) is: 10.7%    Values used to calculate the score:      Age: 60 years      Sex: Male      Is Non- : No      Diabetic: no      Tobacco smoker: No      Systolic Blood Pressure: 128 mmHg      Is BP treated: yes      HDL Cholesterol: 33 mg/dL      Total Cholesterol: 142 mg/dL     Nutrition assessment per patient report:   Foods with fat/cholesterol (fried foods, fatty meats, junk food):  0 servings   Fruits and vegetables (  cup cooked, 1 cup raw):  1 serving of vegetables/day, 1 serving of fruit/day  Caffeine (1 cup coffee, soda, etc):  3-4 ounces/day  Alcohol servings (12 oz. beer, 4 oz. wine, 1  oz. in mixed drink):  decreased with COVID, only drinks socially  Calcium servings (dairy foods, 8 oz. milk, yogurt, cheese, ice cream):  none, he does not like milk  Salt/sodium use:  moderate  Special dietary habits:  None      Activity  Patient is active 3-4 times per week for 30 minutes  or more minutes with walking. He used to be a runner and walk/run on a treadmill at the health club before COVID19.    Laboratory Results Review  We discussed laboratory results today including lipids targets and how foods influence cholesterol.    Weight  His perceived healthy weight is ,200 pounds.  A normal BMI of 25 is equal to 184 pounds.  The current BMI of 31.13 is high-risk range.   A weight reduction speed of 2-3 lbs per month for men is recommended.    PMH   Past Medical History:   Diagnosis Date     Acute prostatitis 12/2004     Calculus of kidney 1994    no recurrence     Cervical radiculopathy 10/4/2016    R side     Class 1 obesity due to excess calories without serious comorbidity with body mass index (BMI) of 31.0 to 31.9 in adult 7/6/2020     Dry eye syndrome      Hypertension      Idiopathic urticaria      Impotence of organic origin      Insomnia      Mild intermittent asthma 1975    hx in childhood     Mixed hyperlipidemia      Obesity      HANNAH (obstructive sleep apnea)      Other acne      Prostatitis      Seborrhea capitis      Tinnitus      Varicella without mention of complication        PSH  Past Surgical History:   Procedure Laterality Date     COLONOSCOPY  2012     DENTAL SURGERY  1980    wisdom teeth     ENDOSCOPY DRUG INDUCED SLEEP N/A 6/3/2020    Procedure: DRUG-INDUCED SLEEP ENDOSCOPY;  Surgeon: Gladys Marroquin MD;  Location: [facility] OR     IMPLANT GENERATOR STIMULATOR (LOCATION) Right 10/28/2020    Procedure: INSERTION, PULSE GENERATOR, NEUROSTIMULATOR;  Surgeon: Gladys Marroquin MD;  Location: [facility] OR     PHOTOREFRACTIVE KERATECTOMY  2000    s/p Lasik surgery     TONSILLECTOMY  1971    s/p Tonsillectomy       Current Meds   Current Outpatient Medications   Medication Sig Dispense Refill     albuterol (PROAIR HFA/PROVENTIL HFA/VENTOLIN HFA) 108 (90 Base) MCG/ACT inhaler Inhale 1-2 puffs into the lungs       augmented betamethasone dipropionate (DIPROLENE) 0.05 % external gel APPLY SPARINGLY AND INFREQUENTLY TO RASH AS NEEDED       fenofibrate (TRICOR) 145 MG tablet Take 145 mg by mouth       losartan (COZAAR) 50 MG tablet Take 1 tablet (50 mg) by mouth daily  0     pravastatin (PRAVACHOL) 40 MG tablet        traZODone (DESYREL) 50 MG tablet TAKE 3 TABLETS BY MOUTH AT BEDTIME FOR SLEEP       aspirin 81 MG tablet Take 81 mg by mouth daily        naltrexone-bupropion (CONTRAVE) 8-90 MG per 12 hr tablet Take 2 tablets by mouth 2 times daily 120 tablet 3     Semaglutide,0.25 or 0.5MG/DOS, (OZEMPIC, 0.25 OR 0.5 MG/DOSE,) 2 MG/1.5ML SOPN For the first 4 weeks inject 0.25mg weekly. Then increase to 0.5mg weekly thereafter. 1.5 mL 3     zolpidem (AMBIEN) 5 MG tablet TAKE 1 TABLET BY MOUTH AT BEDTIME AS NEEDED       Zolpidem Tartrate (AMBIEN PO) Take by mouth nightly as needed for sleep         Allergies      Allergies   Allergen Reactions     Liraglutide GI Disturbance     Saxenda      Vitamin D3 [Cholecalciferol] Rash     Hmg-Coa-R Inhibitors      Myalgias      Statins       Azithromycin Rash     Skin peeling       Family Hx   Family History   Problem Relation Age of Onset     Cancer Father         lung, smoker     Breast Cancer Mother         at age  45     Depression Mother      Obesity Mother      Eye Disorder Mother         cataracts     Hyperlipidemia Mother      Hypertension Mother      Coronary Artery Disease Mother         First MI betweeen 61 and 66 year of age, 3 MIs in past 15 years     Pulmonary Embolism Mother         on Eliquis     Coronary Artery Disease Brother         MI at age 30, pericarditis then MI     Coronary Artery Disease Brother         CABG x 4 at age 48, smoker     Hyperlipidemia Brother      Obesity Brother         Has lost over 100 pounds       Social History  Speedy is  and is working full time. His job is active.  He is  with step children.     Enjoyment of life is 10 with 10 enjoys life fully.    Tobacco History  History   Smoking Status     Never Smoker   Smokeless Tobacco     Never Used     Comment: cigar occ       ROS  CONSTITUTIONAL:  No fever, chills, or sweats. No weight gain/loss.   EENT:  No visual disturbance, ear ache, epistaxis, sore throat  ALLERGIES/IMMUNOLOGIC:  Negative  RESPIRATORY:  No cough, hemoptysis  CARDIOVASCULAR:  As per HPI  GI:  No nausea, vomiting, hematemesis, melena  GI:   negative  :  No urinary frequency, dysuria, or hematuria  INTEGUMENT:  Negative  PSYCHIATRIC:  Negative  NEURO:  Negative  ENDOCRINE:  Negative  MUSCULOSKELETAL:  Negative     Vital Signs   /80 (BP Location: Right arm, Patient Position: Sitting, Cuff Size: Adult Large)   Pulse 86   Ht 1.829 m (6')   Wt 104.1 kg (229 lb 8 oz)   SpO2 97%   BMI 31.13 kg/m        Waist: 44.5 inches  Hip: 44 inches    Physical Exam   In general, the patient is a pleasant male in no apparent distress.    HEENT: NC/AT.  PERRLA.  EOMI.  Sclerae white, not injected.  Nares clear.  Pharynx without erythema or exudate.  Dentition intact.    Neck: No adenopathy.  No thyromegaly.Carotids +4/4 bilaterally without bruits.  No jugular venous distension.   Lungs: Breath sounds clear reynaldo without cracklers, ronchi, wheezes     Cor: RRR. S1S2 splits without murmur, rub, click, or gallop. The PMI is in the 5th ICS in the midclavicular line.  Abdomen: Soft, nontender, nondistended. BS + in all quadrants, no hepatoomegaly.  No aorta or renal artery bruits.   Extremities: No clubbing, cyanosis, or edema. DP and PT pulses are +2/2 at the post-tibial sites bilaterally    Recent Labs   LIPIDS  Component Name  10/22/2020 2/3/2020 4/5/2019 1/7/2019 1/12/2018 2/22/2017 12/2/2016 3/10/2016 2/9/2015 12/11/2013 12/5/2012 3/7/2012 11/21/2011 4/25/2011 12/10/2010     142 136 187 217 (H) 183 143 215 (H) 218 (H) 205 (H) 227 (H) 222 (H) 184 230 (H) 184 149   62 <45 167 (H) 147 92 77 111 166 (H) 197 (H) 154 (H) 105 114 276 (H) 191 (H) 143   33 27 (L) 34 (L) 39 (L) 39 (L) 33 (L) 40 (L) 41 38 (L) 40 (L) 43 35 (L) 40 (L) 38 (L) 35 (L)   109 109 153 (H) 178 (H) 144 110 175 (H) 177 (H) 167 (H) 187 179           4.30 5.04 (H) 5.50 (H) 5.56 (H) 4.69 (H) 4.33 5.38 (H) 5.32 (H) 5.39 (H) 5.68 (H) 5.16 (H) 5.26 (H) 5.75 (H) 4.84 (H) 4.26   97   120 149 (H) 126 95 153 (H) 144 (H) 128 156 (H) 158 (H) 126 135 (H) 108 85           RANDOM                                                 Lab Results   Component Value Date    GLC 97 08/12/2014      Lab Results   Component Value Date    NTBNP 15 01/21/2021     No results found for: NTBNPI   Lab Results   Component Value Date    UCRR 96 01/21/2021      Lab Results   Component Value Date    MICROL <5 01/21/2021      No results found for: MICROALBUMIN   Lab Results   Component Value Date    CRP <2.9 01/14/2021      Lab Results   Component Value Date    CHOL 140 11/13/2007      Lab Results   Component Value Date    TRIG 123 11/13/2007      Lab Results   Component Value Date    HDL 36 (L) 11/13/2007      Lab Results   Component Value Date    LDL 80 11/13/2007      Lab Results   Component Value Date    VLDL 25 11/13/2007      Lab Results   Component Value Date    CHOLHDLRATIO 3.9 11/13/2007     No results found for: NHDL      5/17/2016    CONCLUSIONS:  1. Total Agatston calcium score is 22. Although the absolute score is low,   this amount of coronary artery calcification places this individual at the   63rd percentile when compared to an age and gender race matched control   group and implies an overall intermediate risk of cardiac events in the   next 10 years.   2. The presence of any coronary artery calcification denotes the presence   of coronary artery disease.   3.  Please review radiology portion for incidental noncardiac findings.      Simmons Test Results    WALKING BLOOD PRESSURE RESPONSE (3 minute, 5 MET level walk)   Pre BP: 130/70 mmHg  3 min BP: 180/60 mmHg  1 min post BP: 150/70 mmHg    Pre HR: 80 bpm  3 min HR: 129 bpm  1 min post HR: 99 bpm     ABDOMINAL AORTA ULTRASOUND (< 2.5 normal, borderline 2.5-2.9, abnormal > 3)   SupraIliac 2.05 cm    SupraRenal 2.04 cm    InfraRenal Proximal 1.84 cm    InfraRenal Distal 1.94 cm      Abdominal Aorta Assessment:  normal      LEFT VENTRICULAR ULTRASOUND MEASUREMENTS (adjusted for BSA)  LVIDD 44.5 mm   Septa 10.4 mm   Posterior 9.0 mm     Left Ventricular US Assessment:  normal        Carotid Artery IMT measurements report and plaques in the small area examined:   Left IMT 1.064 mm  Plaques none    Right IMT   0.858 mm  Plaques none       ECG (see tracing):  Normal sinus rhythm;  rate: 86 bpm      Arterial Elasticity per age and gender (see printout):   C1 11.4 mL/mmHg x 10  normal   C2 5.9 mL/mmHg x 100 normal   Supine blood pressure: 148/86 mmHg       Assessment:     Cardiovascular: Asymtpomatic, currenly not complaining of chest pain. He was interested in repeating his coronary artery calcium score-we did not see any benefit in repeating this exam    Blood Pressure:  BP well controlled in clinic, currently on losartan 50 mg every day    Lipids:  10/20 /Trig 62/HDL 33/LDL 97, currently on 60 mg of pravachol and 145 mg fenofibrate, continue current plan  He also had questions about Vascepa, we do not recommend adding that medication at this time, Trig level is 62    Glucose:  97    Return to Clinic:  3-5 years    Health Habit Summary:  Nutrition: Heart Healthy Eating:  some of the time   Exercise:  regularly active  Weight:  high-risk range  Tobacco Use:  never used    Full report to follow prevention team review of test results with scanned final report.    Time spent for patient visit was 60 minutes with more than half the time spent on counseling and coordination of care.    RORO Mckeon CNP       CC  Patient Care Team:  No Ref-Primary, Physician as PCP - General Bloch, Lauren Turner, [facility] as Pharmacist (Pharmacist)  Gladys Marroquin MD as MD (Otolaryngology)  Trey Combs MD as Assigned Sleep Provider  Gladys Marroquin MD as Assigned Surgical Provider  Ayaz Toledo MD as Assigned Pulmonology Provider  NATALIE CUNHA

## 2021-01-22 ENCOUNTER — PATIENT OUTREACH (OUTPATIENT)
Dept: OTOLARYNGOLOGY | Facility: CLINIC | Age: 61
End: 2021-01-22

## 2021-01-22 LAB — INTERPRETATION ECG - MUSE: NORMAL

## 2021-01-22 NOTE — PROGRESS NOTES
Calling patient to follow up on patient's status after INSPIRE activation. Patient reports that he is doing well after activation. Writer assisted with scheduling patient with a 3 week follow up with Dr. Marroquin to assess status at that point. Writer further assessed patient's bilateral tinnitus that was reported after INSPIRE placement. Patient reports that he is in behavioral therapy for this but has not noticed any improvement. Writer discussed with Dr. Marroquin. Dr. Marroquin recommends that patient move forward with a second opinion regarding tinnitus considering lack of improvement. Patient previously denied audiogram recommendation but is in agreement with moving forward with this prior to appointment with neurotology for second opinion. Writer explained that patient will be contacted to schedule audiogram and appointment with Dr. Kaba. Patient is appreciative of the call and in agreement with this plan. Patient denies any further questions or concerns at this time.     Zuri Burgos RN

## 2021-01-25 ENCOUNTER — DOCUMENTATION ONLY (OUTPATIENT)
Dept: SLEEP MEDICINE | Facility: CLINIC | Age: 61
End: 2021-01-25

## 2021-01-25 NOTE — PROGRESS NOTES
STM visit post UAS implant    Patient was contacted for phone follow up after UAS implant activation        Are you using your device nightly?  Yes    How many steps have you stepped up? Level 2  Continue to increase the steps if comfortable    Any mouth or tongue soreness? NO    Any noted snoring or apnea events observed? Unknown-dry throat.     Are you more rested during the day?  No

## 2021-01-26 DIAGNOSIS — H93.13 TINNITUS, BILATERAL: Primary | ICD-10-CM

## 2021-01-27 ENCOUNTER — TELEPHONE (OUTPATIENT)
Dept: AUDIOLOGY | Facility: CLINIC | Age: 61
End: 2021-01-27

## 2021-01-27 ENCOUNTER — TELEPHONE (OUTPATIENT)
Dept: OTOLARYNGOLOGY | Facility: CLINIC | Age: 61
End: 2021-01-27

## 2021-01-27 NOTE — TELEPHONE ENCOUNTER
LVM for patient to schedule hearing evaluation(HEV) with next available provider per Dr. Marroquin.     Left call center number for scheduling.

## 2021-02-05 ENCOUNTER — VIRTUAL VISIT (OUTPATIENT)
Dept: PSYCHOLOGY | Facility: CLINIC | Age: 61
End: 2021-02-05
Payer: COMMERCIAL

## 2021-02-05 DIAGNOSIS — F43.22 ADJUSTMENT DISORDER WITH ANXIOUS MOOD: Primary | ICD-10-CM

## 2021-02-05 PROCEDURE — 90834 PSYTX W PT 45 MINUTES: CPT | Mod: GT | Performed by: PSYCHOLOGIST

## 2021-02-05 NOTE — PROGRESS NOTES
Progress Note    Patient Name: Speedy Carlson  Date: 2021         Service Type: Individual      Session Start Time: 11am  Session End Time: 11:38     Session Length: 38-52 min    Session #: 3    Attendees: Client    Service Modality:  Video Visit:      Provider verified identity through the following two step process.  Patient provided:  Patient  and Patient address    Telemedicine Visit: The patient's condition can be safely assessed and treated via synchronous audio and visual telemedicine encounter.      Reason for Telemedicine Visit: Services only offered telehealth    Originating Site (Patient Location): Patient's place of employment    Distant Site (Provider Location): Provider Remote Setting    Consent:  The patient/guardian has verbally consented to: the potential risks and benefits of telemedicine (video visit) versus in person care; bill my insurance or make self-payment for services provided; and responsibility for payment of non-covered services.     Patient would like the video invitation sent by: Text to cell phone: yes    Mode of Communication:  Video Conference via [email or web address]    As the provider I attest to compliance with applicable laws and regulations related to telemedicine.     Treatment Plan Last Reviewed: pend  PHQ-9 / ILIANA-7 : see emr    Cgi=4//4 at intake  Dundy completed at intake.    Diagnosis:  1. Adjustment disorder with anxious mood        DATA  Interactive Complexity: No  Crisis: No       Progress Since Last Session (Related to Symptoms / Goals / Homework):   Symptoms: Improving .    Homework: Achieved / completed to satisfaction      Episode of Care Goals: Satisfactory progress - ACTION (Actively working towards change); Intervened by reinforcing change plan / affirming steps taken     Current / Ongoing Stressors and Concerns:   Anxiety    Tinnitus after a medical procedure.     Treatment Objective(s) Addressed in This  Session:   Decrease frequency and intensity of feeling anxious  Intervene with use of 3-5 new skills       Intervention:   Support     Client reports anxiety remains low since  he discontinued one of his medication.  Client wondering about discharge other meds-  Encouraged him to talk to his pcp.  Will see a hearing specialist soon.  Tinnitus 9 out of 10.  Explored thoughts about this- managing this loss ok.    Get up and moves about when it gets too overwhelming  Continues to work- concentration ok.    Did turn his device on.     Reviewed fight flight and physiology of stress response patterns.       ASSESSMENT: Current Emotional / Mental Status (status of significant symptoms):   Risk status (Self / Other harm or suicidal ideation)   Patient denies current fears or concerns for personal safety.   Patient denies current or recent suicidal ideation or behaviors.   Patient denies current or recent homicidal ideation or behaviors.   Patient denies current or recent self injurious behavior or ideation.   Patient denies other safety concerns.   Patient reports there has been no change in risk factors since their last session.     Patient reports there has been no change in protective factors since their last session.     Recommended that patient call 911 or go to the local ED should there be a change in any of these risk factors.     Appearance:   Appropriate    Eye Contact:   Good    Psychomotor Behavior: Normal    Attitude:   Cooperative    Orientation:   All   Speech    Rate / Production: Normal     Volume:  Normal    Mood:    Normal   Affect:    Appropriate    Thought Content:  Clear    Thought Form:  Coherent  Logical    Insight:    Good      Medication Review:   No current psychiatric medications prescribed     Medication Compliance:   NA     Changes in Health Issues:   None reported     Chemical Use Review:   Substance Use: Chemical use reviewed, no active concerns identified      Tobacco Use: No current tobacco  use.      Collateral Reports Completed:   Routed note to PCP as needed    PLAN: (Patient Tasks / Therapist Tasks / Other)  Talk with pcp or pharmacist about any med decisions  Look into hear device that assist with tinnitus  Client and therapist agree that therapy is complete for now.  Can schedule via Tweet Categoryhart as needed- will leave chart open .  Past hw   Practice EFT 2 times a day   5-6 rounds.      Elsie Howard LP                                                         ______________________________________________________________________    Treatment Plan    Patient's Name: Speedy Carlson  YOB: 1960        DSM5 Diagnoses: Adjustment Disorders  309.24 (F43.22) With anxiety  Psychosocial / Contextual Factors: post surgery - tinnitus with anxiety  WHODAS: see emr    Referral / Collaboration:  Was/were discussed and client will pursue- audiology specialist.    Anticipated number of session or this episode of care: 6      MeasurableTreatment Goal(s) related to diagnosis / functional impairment(s)  Goal 1: Patient will understand the  fight flight and physiology of stress response patterns.  Will use skills to reduce loss of function,  stress and anxiety.    I will know I've met my goal when.  50% less anxiety.    Objective #A (Patient Action)    Patient will Decrease frequency and intensity of feeling anxious.  Patient will use coping skills daily.  Status: New - Date: . 2-    Intervention(s)  Therapist will teach EFT, breath work, coping skill and provide support.      Patient has heard and agreed to the above plan.      Elsie Howard LP

## 2021-02-08 ENCOUNTER — OFFICE VISIT (OUTPATIENT)
Dept: AUDIOLOGY | Facility: CLINIC | Age: 61
End: 2021-02-08
Payer: COMMERCIAL

## 2021-02-08 DIAGNOSIS — H90.3 SENSORINEURAL HEARING LOSS, BILATERAL: Primary | ICD-10-CM

## 2021-02-08 DIAGNOSIS — H93.13 TINNITUS, BILATERAL: ICD-10-CM

## 2021-02-08 PROCEDURE — 92557 COMPREHENSIVE HEARING TEST: CPT | Performed by: AUDIOLOGIST

## 2021-02-08 PROCEDURE — 92550 TYMPANOMETRY & REFLEX THRESH: CPT | Performed by: AUDIOLOGIST

## 2021-02-08 NOTE — PROGRESS NOTES
AUDIOLOGY REPORT    SUBJECTIVE: Speedy Carlson is a 60 year old male who was seen in the Audiology Clinic at [facility] for audiologic evaluation, referred by Gladys Marroquin M.D. The patient reports constant bilateral tinnitus, which is bothersome. He denies bilateral pain, bilateral drainage, bilateral concerns for hearing, dizziness, or a history of ear surgeries.    OBJECTIVE:  Abuse Screening:  Do you feel unsafe at home or work/school? No  Do you feel threatened by someone? No  Does anyone try to keep you from having contact with others, or doing things outside of your home? No  Physical signs of abuse present? No     Fall Risk Screening:  Have you fallen two or more times in the past year? No  Have you fallen and had an injury in the past year? No    Otoscopic exam indicated ears are clear of cerumen bilaterally     Pure Tone Thresholds assessed using conventional audiometry with good reliability from 250-8000 Hz bilaterally using insert earphones and circumaural headphones     RIGHT:  Normal hearing through 4000 Hz sloping to moderate likely sensorineural hearing loss    LEFT:    Normal hearing through 4000 Hz sloping to moderate likely sensorineural hearing loss    Tympanogram:    RIGHT: Normal eardrum mobility    LEFT:   Normal eardrum mobility    Reflexes (reported by stimulus ear):    RIGHT: Ipsilateral is present at normal levels    RIGHT: Contralateral is present at normal levels    LEFT:   Ipsilateral is present at normal levels    LEFT:   Contralateral is present at normal levels    Speech Reception Threshold:    RIGHT: 10 dB HL    LEFT:   10 dB HL    Word Recognition Score:     RIGHT: 100% at 50 dB HL using NU-6 recorded word list    LEFT:   100% at 50 dB HL using NU-6 recorded word list    ASSESSMENT: Bilateral sensorineural hearing loss. Today s results were discussed with the patient in detail.     PLAN: The patient was counseled regarding hearing  loss and impact on communication. Discussed tinnitus and possible treatment options. Speedy is not an ideal candidate for hearing aids at this time but may consider a trial to take advantage of tinnitus masking programs available if so desired. Repeat audiologic evaluation is recommended if changes are noted. Please call this clinic with questions regarding these results or recommendations.      Matilda Horn, [facility]  Licensed Audiologist  [place] #[id number]    CC: Gladys Marroquin M.D.

## 2021-02-10 ENCOUNTER — DOCUMENTATION ONLY (OUTPATIENT)
Dept: SLEEP MEDICINE | Facility: CLINIC | Age: 61
End: 2021-02-10

## 2021-02-10 NOTE — PROGRESS NOTES
VM left requesting call back for STM and to reschedule a follow up visit with a different provider has his has moved to a different health care facility.

## 2021-02-11 ENCOUNTER — VIRTUAL VISIT (OUTPATIENT)
Dept: OTOLARYNGOLOGY | Facility: CLINIC | Age: 61
End: 2021-02-11
Payer: COMMERCIAL

## 2021-02-11 VITALS — WEIGHT: 229 LBS | HEIGHT: 72 IN | BODY MASS INDEX: 31.02 KG/M2

## 2021-02-11 DIAGNOSIS — H93.13 TINNITUS, BILATERAL: ICD-10-CM

## 2021-02-11 DIAGNOSIS — G47.33 OSA (OBSTRUCTIVE SLEEP APNEA): Primary | ICD-10-CM

## 2021-02-11 PROCEDURE — 99213 OFFICE O/P EST LOW 20 MIN: CPT | Mod: TEL | Performed by: OTOLARYNGOLOGY

## 2021-02-11 ASSESSMENT — MIFFLIN-ST. JEOR: SCORE: 1891.74

## 2021-02-11 ASSESSMENT — PAIN SCALES - GENERAL: PAINLEVEL: NO PAIN (0)

## 2021-02-11 NOTE — LETTER
2/11/2021       RE: Speedy Carlson  [address] [facility] [address]     Dear Colleague,    Thank you for referring your patient, Speedy Carlson, to the [facility] EAR NOSE AND THROAT CLINIC [place] at [facility]. Please see a copy of my visit note below.    Issa Carlson is a 60 year old who is being evaluated via a billable telephone visit.      What phone number would you like to be contacted at? [phone]  How would you like to obtain your AVS? Currentlyhart  Phone call duration: 15 minutes    HPI: Patient reports that his inspire activation went well.  He is currently on level 4.  He has not noticed any improvement in his overall daytime energy yet.  But he does feel like the device is working in some degree.  He is noticing that he is getting into a deeper level of sleep.  And his wife has told him that she has noticed less snoring from him.  Level 4 is pretty strong and so he is not sure that he would be able to tolerate going to level 5 yet.    In terms of his tinnitus is still present bilaterally.  Still loud though he does report that he has learned to deal with it much better than initially.  Its not quite causing him as much anxiety as it was initially.  He continues to work with his therapist.     A/p:  In terms of his inspire implant he should continue to stay at level 4.  I think that he could try going to level 5 at the beginning of March.  If it is really uncomfortable he should go back down to level 4.  He does have his titration study at the end of March.  In terms of his tinnitus I did review his audiogram which does show bilateral moderate sensorineural hearing loss at the highest frequencies.  This could be a contributor to his tinnitus.  He is considered using acetylcysteine as a possible supplement to see if that helps with his tinnitus.  He understands that it certainly not likely to be a cure but he is wondering if it is  worth a try.  I think that it would be reasonable to do a trial of acetylcysteine to see if that helps with his tinnitus.  I will look for the patient's titration study at the end of March.  He can call us at any time if something changes with his tinnitus or his sleep apnea.    I spent a total of 20 minutes total time towards today's visit.  Time spent included seeing and evaluating Speedy Carlson during today's office visit, which included counseling the patient.  Time was also spent reviewing records/tests; independently reviewing results and communicating results to the patient; and documenting clinical information in the electronic health record.        Again, thank you for allowing me to participate in the care of your patient.      Sincerely,    Gladys Marroquin MD

## 2021-02-11 NOTE — PROGRESS NOTES
Are you having any soreness in the throat? Little soreness of the tongue      Do you keep the stimulator on for the entire sleep period? Yes         How many steps have you increased your settings? Setting four.        Has your sleep quality changed? Yes If so, how? Deeper sleep      What questions or concerns do you have at this time?  None.  He will keep at a setting of four until soreness improves.

## 2021-02-11 NOTE — PROGRESS NOTES
Issa Carlson is a 60 year old who is being evaluated via a billable telephone visit.      What phone number would you like to be contacted at? [phone]  How would you like to obtain your AVS? Melanilili  Phone call duration: 15 minutes    HPI: Patient reports that his inspire activation went well.  He is currently on level 4.  He has not noticed any improvement in his overall daytime energy yet.  But he does feel like the device is working in some degree.  He is noticing that he is getting into a deeper level of sleep.  And his wife has told him that she has noticed less snoring from him.  Level 4 is pretty strong and so he is not sure that he would be able to tolerate going to level 5 yet.    In terms of his tinnitus is still present bilaterally.  Still loud though he does report that he has learned to deal with it much better than initially.  Its not quite causing him as much anxiety as it was initially.  He continues to work with his therapist.     A/p:  In terms of his inspire implant he should continue to stay at level 4.  I think that he could try going to level 5 at the beginning of March.  If it is really uncomfortable he should go back down to level 4.  He does have his titration study at the end of March.  In terms of his tinnitus I did review his audiogram which does show bilateral moderate sensorineural hearing loss at the highest frequencies.  This could be a contributor to his tinnitus.  He is considered using acetylcysteine as a possible supplement to see if that helps with his tinnitus.  He understands that it certainly not likely to be a cure but he is wondering if it is worth a try.  I think that it would be reasonable to do a trial of acetylcysteine to see if that helps with his tinnitus.  I will look for the patient's titration study at the end of March.  He can call us at any time if something changes with his tinnitus or his sleep apnea.    I spent a total of 20 minutes total time towards  today's visit.  Time spent included seeing and evaluating Speedy Carlson during today's office visit, which included counseling the patient.  Time was also spent reviewing records/tests; independently reviewing results and communicating results to the patient; and documenting clinical information in the electronic health record.

## 2021-02-12 ENCOUNTER — TELEPHONE (OUTPATIENT)
Dept: ENDOCRINOLOGY | Facility: CLINIC | Age: 61
End: 2021-02-12

## 2021-02-12 NOTE — TELEPHONE ENCOUNTER
LVM for patient with call center number and sent [employer] message to schedule a video return visit with Mary Childers on around 4/6/21 for a 3 month follow-up appointment.

## 2021-02-17 ENCOUNTER — VIRTUAL VISIT (OUTPATIENT)
Dept: PHARMACY | Facility: CLINIC | Age: 61
End: 2021-02-17
Payer: COMMERCIAL

## 2021-02-17 DIAGNOSIS — I10 ESSENTIAL HYPERTENSION: ICD-10-CM

## 2021-02-17 DIAGNOSIS — I25.10 CORONARY ARTERY DISEASE DUE TO LIPID RICH PLAQUE: ICD-10-CM

## 2021-02-17 DIAGNOSIS — G47.00 INSOMNIA, UNSPECIFIED TYPE: ICD-10-CM

## 2021-02-17 DIAGNOSIS — E66.811 CLASS 1 OBESITY DUE TO EXCESS CALORIES WITH SERIOUS COMORBIDITY AND BODY MASS INDEX (BMI) OF 34.0 TO 34.9 IN ADULT: ICD-10-CM

## 2021-02-17 DIAGNOSIS — E78.5 HYPERLIPIDEMIA LDL GOAL <130: ICD-10-CM

## 2021-02-17 DIAGNOSIS — J45.20 MILD INTERMITTENT ASTHMA WITHOUT COMPLICATION: Primary | ICD-10-CM

## 2021-02-17 DIAGNOSIS — I10 BENIGN ESSENTIAL HYPERTENSION: ICD-10-CM

## 2021-02-17 DIAGNOSIS — E66.09 CLASS 1 OBESITY DUE TO EXCESS CALORIES WITH SERIOUS COMORBIDITY AND BODY MASS INDEX (BMI) OF 34.0 TO 34.9 IN ADULT: ICD-10-CM

## 2021-02-17 DIAGNOSIS — H93.13 TINNITUS OF BOTH EARS: ICD-10-CM

## 2021-02-17 DIAGNOSIS — Z86.39 HX OF OBESITY: ICD-10-CM

## 2021-02-17 DIAGNOSIS — G47.33 OSA (OBSTRUCTIVE SLEEP APNEA): ICD-10-CM

## 2021-02-17 DIAGNOSIS — I25.83 CORONARY ARTERY DISEASE DUE TO LIPID RICH PLAQUE: ICD-10-CM

## 2021-02-17 PROCEDURE — 99607 MTMS BY PHARM ADDL 15 MIN: CPT | Mod: TEL | Performed by: PHARMACIST

## 2021-02-17 PROCEDURE — 99605 MTMS BY PHARM NP 15 MIN: CPT | Mod: TEL | Performed by: PHARMACIST

## 2021-02-17 NOTE — Clinical Note
Mary and you patient. Had inspire placed for HANNAH and since then has had constant bilateral tinnitus. Sleep medicine doesn't believe from inspire placement so wanted meds looked at to see if contributing factor. Meds on list that could cause tinnitus: bupriopion (dose dependent), losartan, NSAID (like aspirni). He already held aspirin and no change. Restarting today. He preferred to trial off contrave over losartan as ozempic was started 1 month ago and feeling fullness with Ozempic. I am following up with him in 1 month  Albertina PACHECO

## 2021-02-17 NOTE — PROGRESS NOTES
Medication Therapy Management (MTM) Encounter    ASSESSMENT:                            Medication Adherence/Access: No issues identified    Sleep Apnea: Followed by Sleep Medicine.     Tinnitus: Needs improvement. Discussed the following medications that could be affecting tinnitus:   -Contrave - bupropion containing product has shown 1-6% likelihood, dose dependent  -Losartan <2% likelihood   -NSAIDs (including aspirin) - studies limited to show that low dose aspirin could cause tinnitus, but as patient trialed off and not improved, can likely restart (see below).     Per patient preference and since another medication already on board to help with weight loss, will taper off Contrave for now and stay off for 1 month.    Limited evidence of NAC in tinnitus, has been utilized for chemotherapy induced ototoxicity, 600-1200 mg twice daily. Reasonable to continue to determine if helpful.      Obesity: Weight loss could be progressing, unknown as of recent since starting Ozempic but does subjectively appear to be experiencing benefits. See above regarding contrave.     Hypertension: BP at goal <140/90 mmHg.     CAD: Would benefit from lipid repeat. Would benefit from restarting aspirin.      Insomnia: stable.     Asthma: Stable from subjective information. ACT due, could complete at follow up.     PLAN:                            1. Information on COVID-19 Vaccine: [email or web address]     2. Can taper off Contrave - decrease to 1 tablet twice daily for 1 week then can stop. CPA with Mary Childers PA-C.     3. Can restart aspirin 81 mg daily     4. Get lipid panel rechecked. Patient to follow up with PCP.     5. If going to utilize NAC for tinnitus - could consider modification of dose to 1000 mg BID. Appears dosing of BID more common.     Future: ACT    Follow-up: 1 month, already scheduled.     SUBJECTIVE/OBJECTIVE:                          Speedy Carlson is a 60 year old male called  "for a follow-up visit. He was referred to me from Mary Childers PA-C.  Today's visit is a follow-up MTM visit from 4/2/2020. First visit of 2021.     Reason for visit: Yearly comprehensive review of medications, due. He also is requesting information regarding COVID vaccine and when he can get it.     Allergies/ADRs: Reviewed in chart  Tobacco: He reports that he has never smoked. He has never used smokeless tobacco.  Alcohol: not currently using  Caffeine: no caffeine  Activity: see below  Past Medical History: Reviewed in chart    Medication Adherence/Access: no issues reported    Medication Adherence/Access:  Patient uses pill box(es).  Patient takes medications 2 time(s) per day.   Per patient, misses medication 0 times per week. He states that he has a good regimen and never misses doses of medication.     Sleep Apnea:     Inspire implant for sleep implant. Reports that since implant has had severe tinnitus. He has followed up with sleep medicine and ENT regarding this.     Tinnitus:   NAC 1000 mg daily - started 1 week ago.     Has been having tinnitus since the inspire implant. Constant bilateral tinnitus. See Audiology note for more information, last seen 2/8/21. Per patient, sleep medicine, doesn't believe from inspire. No NSAID use. Stopped aspirin 2 weeks ago to determine if impacting and haven't noticed difference. Was referred to MTM again to discuss if any medications could be contributing to tinnitus.     Obesity:  Contrave 2 tablets BID - forgetting PM dose 50% of the time.   Ozempic 0.5 mg weekly, first week at higher dose.     Followed by Mary Childers PA-C last seen 1/6/21 for [facility], has also been seen by Shaila Wooten CNP historically. Medication side effects: \"ties stomach in knots at times, food dependent\". He will notice that when he overeats this will occur more. Side effects are much less with Ozempic compared to Saxenda. He prefers to continue as improving as he " "continues.   Weight History: Around age 41 started struggling with weight loss. He worries about his health due to family history of heart issues. He wants to try and get healthier due to this.  Diet/Eating Habits: Patient reports that for him his largest issues are cravings/snacking. He feels that he has an \"almost addiction to sugars\".  Contrave has helped with this. Reports portions are smaller since starting Ozempic, has noticed more over the last 2 weeks.   Exercise/Activity: Patient reports that he tries to work out 2-3 times weekly for 1 hour each, ellilptical usually. He will also lift weights during that times, has not done as much recently.   Failed medications include: Saxenda: GI upset, nausea, abdominal pain.     Weight today: unknown.   Initial Consult Weight 11/25/2019: 252 lb 4.8 oz     Wt Readings from Last 4 Encounters:   02/11/21 229 lb (103.9 kg)   01/21/21 229 lb 8 oz (104.1 kg)   01/20/21 220 lb (99.8 kg)   01/06/21 220 lb (99.8 kg)     Estimated body mass index is 31.06 kg/m  as calculated from the following:    Height as of 2/11/21: 6' (1.829 m).    Weight as of 2/11/21: 229 lb (103.9 kg).    Hypertension:   Losartan 50 mg daily.     Patient does self-monitor BP. Home BP monitoring in range of 120-30's systolic over 80's diastolic. . Patient reports no current medication side effects.   BP Readings from Last 3 Encounters:   01/21/21 128/80   01/20/21 125/83   10/28/20 126/85     CAD:   Pravastatin 40mg once daily    Fenofibrate 145 mg daily   Aspirin 81 mg daily - quit taking 2-3 weeks ago.     Coronary artery score of 22 with 63 percceptile. Was seen by [facility] 1/21/21. Pt reports no significant myalgias or other side effects. Hasn't had lipids checked recently. Brother has history of heart attack, 5 vessel CABG. Another brother was found to have mild coronary artery disease and coronary spasm. Mother suffered 2 heart attacks and stenting in last 60s/early 70s, also " had 2 TIAs.  No issues of bleeding or bruising. Last lipid panel from 1/12/2018.      Insomnia:   Zolpidem 5 mg nightly PRN - rarely uses  Trazodone 150 mg nightly.     Gets about 6-8 hours of sleep each night. Followed by sleep medicine. Finds that sleep is generally okay with trazodone. See above for sleep apnea information.     Asthma:   Short-Acting Bronchodilator: Albuterol MDI.    No current issues of SOB, no wheezing. Uses albuterol inhaler 1 time per week. Waking up in middle of night from issues with breathing ~1-2 times per week,  Feels it is more from HANNAH rather than asthma. Triggers include: upper respiratory infections and pollens.  Patient reports the following symptoms: none.  Asthma Action Plan on file: NO    I spent 45 minutes with this patient today. All changes were made via collaborative practice agreement with Mary Childers PA-C. A copy of the visit note was provided to the patient's referring provider.    The patient was sent via [employer] a summary of these recommendations.     Lauren Bloch, PharmD  Medication Therapy Management Pharmacist   [facility]    Telemedicine Visit Details  Type of service:  Telephone visit  Start Time: 3:32 PM  End Time: 4:17 PM  Originating Location (patient location): Home  Distant Location (provider location):  [facility] [place]      Medication Therapy Recommendations  Class 1 obesity due to excess calories with serious comorbidity and body mass index (BMI) of 34.0 to 34.9 in adult    Current Medication: naltrexone-bupropion (CONTRAVE) 8-90 MG per 12 hr tablet   Rationale: Undesirable effect - Adverse medication event - Safety   Recommendation: Discontinue Medication - Decrease to 1 tablet BID for 1 week then stop.   Status: Accepted per CPA         Coronary artery disease due to lipid rich plaque    Current Medication: aspirin 81 MG tablet   Rationale: Patient forgets to take - Adherence - Adherence   Recommendation:  Provide Adherence Intervention - ASPIRIN 81 PO - 1 tablet daily.   Status: Patient Agreed - Adherence/Education   Note: Restart.          Current Medication: pravastatin (PRAVACHOL) 40 MG tablet   Rationale: Medication requires monitoring - Needs additional monitoring   Recommendation: Order Lab - Lipid panel   Status: Contact Provider - Awaiting Response   Note: patient to follow up with PCP         Tinnitus    Current Medication: ACETYLCYSTEINE PO   Rationale: Dose too low - Dosage too low - Effectiveness   Recommendation: Increase Dose - 1000 mg twice daily   Status: Accepted - no CPA Needed

## 2021-02-18 NOTE — PATIENT INSTRUCTIONS
Recommendations from today's MTM visit:                                                    MTM (medication therapy management) is a service provided by a clinical pharmacist designed to help you get the most of out of your medicines.      1. Information on COVID-19 Vaccine: [email or web address]      2. Can taper off Contrave - decrease to 1 tablet twice daily for 1 week then can stop.     3. Can restart aspirin 81 mg daily      4. Get lipid panel rechecked. You can follow up with primary care provider about this.      5. If going to utilize NAC for tinnitus - could consider modification of dose to 1000 mg twice daily. Appears dosing of twice daily more common.      Follow-up: 1 month, already scheduled.       It was great to speak with you today.  I value your experience and would be very thankful for your time with providing feedback on our clinic survey. You may receive a survey via email or text message in the next few days.       My Clinical Pharmacist's contact information:                                                      It was a pleasure talking with you today!  Please feel free to contact me with any questions or concerns you have.       Lauren Bloch, PharmD  Medication Therapy Management Pharmacist   [facility] [place]

## 2021-03-11 ENCOUNTER — VIRTUAL VISIT (OUTPATIENT)
Dept: PHARMACY | Facility: CLINIC | Age: 61
End: 2021-03-11
Payer: COMMERCIAL

## 2021-03-11 DIAGNOSIS — H93.13 TINNITUS OF BOTH EARS: Primary | ICD-10-CM

## 2021-03-11 DIAGNOSIS — I10 BENIGN ESSENTIAL HYPERTENSION: ICD-10-CM

## 2021-03-11 DIAGNOSIS — I25.10 CORONARY ARTERY DISEASE DUE TO LIPID RICH PLAQUE: ICD-10-CM

## 2021-03-11 DIAGNOSIS — I25.83 CORONARY ARTERY DISEASE DUE TO LIPID RICH PLAQUE: ICD-10-CM

## 2021-03-11 DIAGNOSIS — Z86.39 HX OF OBESITY: ICD-10-CM

## 2021-03-11 PROCEDURE — 99606 MTMS BY PHARM EST 15 MIN: CPT | Mod: TEL | Performed by: PHARMACIST

## 2021-03-11 PROCEDURE — 99607 MTMS BY PHARM ADDL 15 MIN: CPT | Mod: TEL | Performed by: PHARMACIST

## 2021-03-11 NOTE — Clinical Note
He stopped Ozempic, got to 0.5 mg weekly and couldn't tolerate. He preferred to be off. He does not want to try any GLP1 agonist again. Restarted Contrave. He is going to focus on nutrition/exercise. Told him to follow up with you in 6 weeks. Albertina PACHECO

## 2021-03-11 NOTE — PROGRESS NOTES
Medication Therapy Management (MTM) Encounter    ASSESSMENT:                            Medication Adherence/Access: No issues identified    Tinnitus: may benefit from starting NAC 1000 mg BID to determine if helpful for tinnitus. Due to no greater changes with trial off Contrave and Losartan although does have limited risk, patient has been on for a very long time and likely not an impacting factor. Therefore no other medications on current regimen are likely contributors, recommend following up with Audiology.     Obesity: Reasonable to restart Contrave. When discussing alternative option(s) for weight loss:   Phentermine: not appropriate due to cardiac history   Topiramate: Side effects:   Qsymia: Not appropraite due to the above  Contrave: already taking   GLP1 agonist: didn't tolerate  Orlistat: Greater potential for GI issues, so likely not a good option and also doesn't have high fat diet.     Therefore, discussed that next steps/options limited. Patient prefers to continue solely Contrave and work on nutrition/exercise goals he has self set.     Hypertension: Stable, at home BP <140/90 mmHg.     CAD: Would benefit from restarting aspirin 81 mg daily.     PLAN:                            1. Restart aspirin 81 mg daily     2. Restart NAC 1000 mg twice daily. Patient to follow up with Audiology.     3. Patient to focus on nutrition/activity goals.     Follow-up:   Mary Childers PA-C in 6 weeks   Lauren Bloch, [facility] pharmacist in 6 months     SUBJECTIVE/OBJECTIVE:                          Speedy Carlson is a 60 year old male called for a follow-up visit. He was referred to me from Mary Childers PA-C.  Today's visit is a follow-up MTM visit from 2/17/21.      Reason for visit: Contrave taper follow up; tinnitus check in    Allergies/ADRs: Reviewed in chart  Tobacco: He reports that he has never smoked. He has never used smokeless tobacco.  Alcohol: not currently using  Caffeine: no caffeine  Activity: see below    Past Medical History: Reviewed in chart    Medication Adherence/Access: no issues reported    Tinnitus:   NAC 1000 mg daily - has forgotten to take over the last month.     Has been having tinnitus since the inspire implant. Constant bilateral tinnitus. See Audiology note for more information, last seen 2/8/21. Per patient, sleep medicine, doesn't believe from inspire. No NSAID use. Stopped aspirin 6+ weeks ago to ensure not related to tinnitus. Was referred to MTM again to discuss if any medications could be contributing to tinnitus.     Obesity:  Contrave 2 tablets BID - tapered off Contrave for 3-4 weeks and didn't see a difference so restarted.    - stopped     Followed by Mary Childers PA-C last seen 1/6/21 for [facility], has also been seen by Shaila Wooten CNP historically. Medication side effects from Ozempic: vomiting, nausea, abdominal pain. So he stopped the Ozempic. He started having these symptoms after last MTM appointment. He feels much better since stopping Ozempic.Restarted Contrave when stopped the Ozempic. No side effects with restarting, titrated up over the last week. No medication side effects denoted.   Diet/Eating Habits: Patient reports that for him his largest issues are cravings/snacking, but Contrave helping with this. He would like to get back to being more conscious of his nutrition, focusing on healthy 3 meals per day centered higher protein and lower carbohydrates.   Exercise/Activity: Patient reports that he could not work out while on the ozempic, has restarted exercise with stopping Ozempic. Has been walking more and plans to add walking into his regimen on a daily basis once starts getting nicer out.   Failed medications include: Saxenda/Ozempic: GI upset, nausea, abdominal pain; topiramate: hyper/jittery/anxiety    Weight today: 223 lb   Initial Consult Weight 11/25/2019: 252 lb 4.8 oz   Cumulative Weight Loss: -29 lb, -11.5% from baseline  Wt Readings from  Last 4 Encounters:   02/11/21 229 lb (103.9 kg)   01/21/21 229 lb 8 oz (104.1 kg)   01/20/21 220 lb (99.8 kg)   01/06/21 220 lb (99.8 kg)     Hypertension:   Losartan 50 mg daily.     Patient does self-monitor BP. Home BP monitoring in range of 120-30's systolic over 80's diastolic. Patient reports no current medication side effects.     BP Readings from Last 3 Encounters:   01/21/21 128/80   01/20/21 125/83   10/28/20 126/85     CAD:   Pravastatin 40mg once daily    Fenofibrate 145 mg daily   Aspirin 81 mg daily - hasn't restarted yet     Coronary artery score of 22 with 63 percceptile. Was seen by [facility] 1/21/21. Pt reports no significant myalgias or other side effects. Hasn't had lipids checked recently still but planning on having done soon with PCP. Brother has history of heart attack, 5 vessel CABG. Another brother was found to have mild coronary artery disease and coronary spasm. Mother suffered 2 heart attacks and stenting in last 60s/early 70s, also had 2 TIAs. Last lipid panel from 1/12/2018.     I spent 25 minutes with this patient today. A copy of the visit note was provided to the patient's referring provider.    The patient was sent via [employer] a summary of these recommendations.     Lauren Bloch, PharmD  Medication Therapy Management Pharmacist   [facility]    Telemedicine Visit Details  Type of service:  Telephone visit  Start Time: 10:30 AM  End Time: 10:55 AM  Originating Location (patient location): Home  Distant Location (provider location):  [facility] [place]      Medication Therapy Recommendations  Coronary artery disease due to lipid rich plaque    Current Medication: aspirin 81 MG tablet   Rationale: Patient forgets to take - Adherence - Adherence   Recommendation: Provide Education - Restart 81 mg daily   Status: Patient Agreed - Adherence/Education         Tinnitus    Current Medication: ACETYLCYSTEINE PO   Rationale:  Patient forgets to take - Adherence - Adherence   Recommendation: Provide Education - 1000 mg twice daily   Status: Accepted - no CPA Needed

## 2021-03-12 NOTE — PATIENT INSTRUCTIONS
Recommendations from today's MTM visit:                                                    MTM (medication therapy management) is a service provided by a clinical pharmacist designed to help you get the most of out of your medicines.      1. Restart aspirin 81 mg daily     2. Restart NAC 1000 mg twice daily. Patient to follow up with Audiology.     3. Patient to focus on nutrition/activity goals.     Follow-up:   Mary Childers PA-C in 6 weeks   Lauren Bloch, MTM pharmacist in 6 months     It was great to speak with you today.  I value your experience and would be very thankful for your time with providing feedback on our clinic survey. You may receive a survey via email or text message in the next few days.       My Clinical Pharmacist's contact information:                                                      It was a pleasure talking with you today!  Please feel free to contact me with any questions or concerns you have.      Lauren Bloch, PharmD  Medication Therapy Management Pharmacist   [facility]

## 2021-03-29 ENCOUNTER — THERAPY VISIT (OUTPATIENT)
Dept: SLEEP MEDICINE | Facility: CLINIC | Age: 61
End: 2021-03-29
Payer: COMMERCIAL

## 2021-03-29 DIAGNOSIS — G47.33 OSA (OBSTRUCTIVE SLEEP APNEA): ICD-10-CM

## 2021-03-29 PROCEDURE — 95811 POLYSOM 6/>YRS CPAP 4/> PARM: CPT | Mod: GC | Performed by: SPECIALIST

## 2021-03-30 NOTE — PATIENT INSTRUCTIONS
[facility]    1. Your sleep study will be reviewed by a sleep physician within the next few days.     2. Please follow up in the sleep clinic as scheduled, or, make an appointment with your sleep provider to be seen within two weeks to discuss the results of the sleep study.    3. If you have any questions or problems with your treatment plan, please contact your sleep clinic provider at [phone] to further manage your condition.    4. Please review your attached medication list, and, at your follow-up appointment advise your sleep clinic provider about any changes.    5. Go to [email or web address] for more information about your sleep problems.    TONY Mora  March 30, 2021

## 2021-04-06 VITALS — HEIGHT: 72 IN | BODY MASS INDEX: 31.02 KG/M2 | WEIGHT: 229 LBS

## 2021-04-06 ASSESSMENT — MIFFLIN-ST. JEOR: SCORE: 1891.74

## 2021-04-06 NOTE — PROGRESS NOTES
Issa Carlson is a 60 year old who is being evaluated via a billable video visit.      How would you like to obtain your AVS? MyChart  If the video visit is dropped, the invitation should be resent by: Text to cell phone: [phone]  Will anyone else be joining your video visit? No      Video Start Time: 1 PM  Video-Visit Details    Type of service:  Video Visit    Video End Time:1:30 PM    Originating Location (pt. Location): Home    Distant Location (provider location):  [facility]     Platform used for Video Visit: Melissa CHERRY CMA, [facility], 4/6/2021 12:41 PM        [facility]   Outpatient Sleep Medicine Follow-up Visit  April 7, 2021    Name: Speedy Carlson MRN# [id number]   Age: 60 year old YOB: 1960     Date of Consultation: April 7, 2021  Consultation is requested by: No referring provider defined for this encounter.  Primary care provider: No Ref-Primary, Physician           Assessment and Plan:     Sleep Diagnoses:    Severe obstructive sleep apnea status post hypoglossal neurostimulator implant    Insomnia    Comorbid conditions:    Hypertension    Obesity BMI 31    Hyperlipidemia    Chronic idiopathic urticaria    Summary Recommendations:    Gradually increase your settings up to 3 steps up as long as you are comfortable without difficulty sleeping.    We will arrange for home sleep testing at this level in 3 months    Contact us before that time if you are having difficulty with sleep, snoring, or discomfort but the device.    Hypoglossal neurostimulator data:  He initially presented with snoring, irregular breathing and witnessed apneas, 'mildl' daytime/evening sleepiness (ESS 3), comorbid hypertension, and insomnia managed with trazodone.     Home Sleep Study [facility]: 04/16/2019 ([id number]#) AHI 52, RDI/DEMETRIUS of 52 per hour. The low O2 sat is 72%. This is associated with continuous loud snoring and spiking desaturation and  what might be a REM pattern.     He was started on CPAP. He purchased a machine on line, he tried several masks. He set up machine himself with guidance.  It would come off at night. He states primary difficulty was mask discomfort. He had  trouble getting used to airflow. He sleeps on his stomach. He used the machine every night for a few months (>60 days).     Sleep endoscopy 6/3/20 showed anterior posterior collapse at the level of the soft palate as well as tongue base.     Implanted: 10/28/2020   Activation: 12/9/2020   Range 2.2-3.2 V   PSG titration study: 3/29/21   PSG follow up visit  4/7/21     He has a history of severe obstructive sleep apnea   intolerance to positive airway pressure therapy.         History of Present Illness:     Speedy Carlson is a 60 year old male with recent hypoglossal neurostimulator implant and titration study who returns for follow-up guidance regarding current settings.  He was sent home at the voltage of 2.2 V with a range of 1.9 to 3.2 V at the end of the the study.  When previously titrating up to 2.5 V the patient has noticed some discomfort at this level and above and correction of snoring at several voltage levels below.  During the titration study, a voltage range of 2.6 to 3.0 V resulted in a gradually decreasing frequency of hypopneas and apneas from 11.8 to 0.    PREVIOUS SLEEP STUDIES:  Date: AHI 54 in 2019  Study Date: 3/29/2021  MRN: [id number]  Referring Provider: Mark Foreman MD  Ordering Provider: Mark Foreman MD    Indications for Polysomnography: The patient is a 60 year old male who is 6' and weighs 229.0 lbs. His BMI is 31.4, [place] sleepiness scale 0/24 and neck circumference is 40cm cm. Relevant medical history includes hypertension, hyperlipidemia and severe HANNAH diagnosed in 2019 with a home sleep apnea test that showed apnea-hypopnea index of 52 events per hour and prema oxygen saturation of 72% with loud snoring.  The patient had been intolerant to  CPAP and recently underwent hypoglossal nerve stimulator placement on 10/28/2020 with subsequent  activation of the device on 01/20/2021.  [facility] Serial number is [id number].     Polysomnogram Data: A full night polysomnogram recorded the standard physiologic parameters including EEG, EOG, EMG, ECG, nasal and oral airflow. Respiratory parameters of chest and abdominal movements were recorded with respiratory inductance plethysmography. Oxygen saturation was recorded by pulse oximetry. Hypopnea scoring rule used: 1B 4%.    Treatment PSG  Sleep Architecture: All sleep stages present with increased spontaneous arousals and prolonged wakefulness during titration procedure with incoming voltage of 2.5  v in +/-/+ configuration.  The total recording time of the polysomnogram was 440.8 minutes. The total sleep time was 364.5 minutes. Sleep latency was decreased at 0.5 minutes with the use of a sleep aid zolpidem 5 mg.  REM latency prolonged at  291.5 minutes. Arousal index was normal at 32.4 arousals per hour. Sleep efficiency was slightly decreased at 82.7%. Wake after sleep onset was prolonged at 75.5 minutes. The patient spent 7.4% of total sleep time in Stage N1, 61.2% in Stage N2, 14.4% in Stage N3, and 17.0% in REM. Time in REM supine was 0.5 minutes.    Respiration: mild  sleep apnea and no hypoxemia at all voltages 3.0 -3.2 v.      The patient was titrated at voltages ranging from 2.3 volts up to  3.2 volts. The final pressure achieved was 3.2  volts with a residual AHI of 13.3 events per hour. Time in REM supine on final pressure was minimal at 0.5 minutes.     This titration was considered good (residual AHI < 10 events per hour or 50% decrease if baseline AHI > 15 events per hour)  without including REM supine sleep at settings with relatively good synchronization of stimulation:      Snoring - was not audible with background sound.    Respiratory rate and pattern - was notable for normal respiratory rate and  pattern.    Sustained Sleep Associated Hypoventilation - Transcutaneous carbon dioxide monitoring was not used.    Sleep Associated Hypoxemia - (Greater than 5 minutes O2 sat at or below 88%) was not present. Baseline oxygen saturation was 95.6%. Lowest oxygen saturation was 79.5%. Time spent less than or equal to 88% was 4.2 minutes. Time spent less than or equal to 89% was 5.4 minutes.      Movement Activity: No abnormal sleep movements.    Periodic Limb Activity - There were 103 PLMs during the entire study. The PLM index was 17.0 movements per hour. The PLM Arousal Index was 3.6 per hour.    REM EMG Activity - Excessive transient/sustained muscle activity was not present.    Nocturnal Behavior - Abnormal sleep related behaviors were not noted during/arising out of NREM / REM sleep.     Bruxism - None apparent.        Cardiac Summary: Sinus rhythm.  The average pulse rate was 74.2 bpm. The minimum pulse rate was 61.9 bpm while the maximum pulse rate was 116.1 bpm. Arrhythmias were not noted.        Assessment:     All sleep stages present with increased spontaneous arousals unrelated to voltage levels with incoming voltage of 2.5 v in +/-/+ configuration.     Mild sleep apnea and no hypoxemia at all voltages 2.5-3.2v.    No abnormal sleep movements.  Recommendations:    Discharge voltage was set at 2.2 volts with a range of 1.9 to 3.2 V to patient comfort and resolution of snoring with recommended use all night every night.      Home sleep test is recommended in 3 months for further evaluation of effectiveness of the above settings.     Suggest optimizing sleep schedule and avoiding sleep deprivation.    Advice regarding the risks of drowsy driving.       Most Recent SCALES:    EPWORTH SLEEPINESS SCALE WITHIN 1 YEAR WITHIN 10 DAYS   Sitting and reading 2 2   Watching TV 2 2   Sitting, inactive in a public place (theatre or mtg.) 0  0    As a passenger in a car 1 1   Lying down to rest in the afternoon when  circumstance permit 2 2   Sitting and talking to someone 0 0   Sitting quietly after lunch without alcohol 1 1   In a car, while stopped for a few minutes in traffic 0 0   TOTAL SCORE 8 8       INSOMNIA SEVERITY INDEX WITHIN 1 YEAR   Difficulty falling asleep 2   Difficult staying asleep 2   Problems waking up to early 3   How SATISFIED/DISSATISFIED are you with your CURRENT sleep pattern? 3   How NOTICEABLE to others do you think your sleep pattern is in terms of your quality of life? 4   How WORRIED/DISTRESSED are you about your current sleep pattern? 4   To what extent do you consider your sleep problem to INTERFERE with your daily fuctioning(e.g. daytime fatigue, mood, ability to function at work/daily chores, concentration, mood,etc.) CURRENTLY? 3   INSOMNIA SEVERITY INDEX TOTAL SCORE 21    --absence of insomnia (0-7); sub-threshold insomnia (8-14); moderate insomnia (15-21); and severe insomnia (22-28)--               Medications:     Current Outpatient Medications   Medication Sig     albuterol (PROAIR HFA/PROVENTIL HFA/VENTOLIN HFA) 108 (90 Base) MCG/ACT inhaler Inhale 1-2 puffs into the lungs     aspirin 81 MG tablet Take 81 mg by mouth daily     fenofibrate (TRICOR) 145 MG tablet Take 145 mg by mouth     losartan (COZAAR) 50 MG tablet Take 1 tablet (50 mg) by mouth daily     pravastatin (PRAVACHOL) 40 MG tablet Take 40 mg by mouth daily      traZODone (DESYREL) 50 MG tablet Take 150 mg by mouth At Bedtime      zolpidem (AMBIEN) 5 MG tablet Take 5 mg by mouth nightly as needed for sleep      No current facility-administered medications for this visit.         Allergies   Allergen Reactions     Liraglutide GI Disturbance     Saxenda      Vitamin D3 [Cholecalciferol] Rash     Hmg-Coa-R Inhibitors      Myalgias      Statins       Azithromycin Rash     Skin peeling            Problem List:     Patient Active Problem List   Diagnosis     Other acne     Tear film insufficiency     Hyperlipidemia LDL goal <130      HANNAH (obstructive sleep apnea)- severe (AHI 52)     Advance directive on file     ED (erectile dysfunction)     Family history of early CAD     Hypertension     Hypovitaminosis D     Insomnia     Seborrhea capitis     Tinnitus     Overweight (BMI 25.0-29.9)     Hx of obesity     Coronary artery disease due to lipid rich plaque     Mild intermittent asthma without complication     Chronic idiopathic urticaria            Past Medical History:     Does not need 02 supplement at night   Past Medical History:   Diagnosis Date     Acute prostatitis 12/2004     Calculus of kidney 1994    no recurrence     Cervical radiculopathy 10/04/2016    R side     Class 1 obesity due to excess calories without serious comorbidity with body mass index (BMI) of 31.0 to 31.9 in adult 07/06/2020     Coronary artery disease due to lipid rich plaque     coronary arteryt calcium score of 22, 63rd percentile in 2016     Dry eye syndrome      Hypertension      Idiopathic urticaria      Impotence of organic origin      Insomnia      Mild intermittent asthma 1975    hx in childhood     Mixed hyperlipidemia      Obesity      HANNAH (obstructive sleep apnea)      Other acne      Prostatitis      Seborrhea capitis      Tinnitus      Varicella without mention of complication              Past Surgical History:      Past Surgical History:   Procedure Laterality Date     COLONOSCOPY  2012     DENTAL SURGERY  1980    wisdom teeth     ENDOSCOPY DRUG INDUCED SLEEP N/A 6/3/2020    Procedure: DRUG-INDUCED SLEEP ENDOSCOPY;  Surgeon: Gladys Marroquin MD;  Location:  OR     IMPLANT GENERATOR STIMULATOR (LOCATION) Right 10/28/2020    Procedure: INSERTION, PULSE GENERATOR, NEUROSTIMULATOR;  Surgeon: Gladys Marroquin MD;  Location: [facility] OR     PHOTOREFRACTIVE KERATECTOMY  2000    s/p Lasik surgery     TONSILLECTOMY  1971    s/p Tonsillectomy              Physical Examination:     Reported vitals:  There were no vitals taken for this visit.    GENERAL: Healthy, alert and no distress  EYES: Eyes grossly normal to inspection.  No discharge or erythema, or obvious scleral/conjunctival abnormalities.  RESP: No audible wheeze, cough, or visible cyanosis.  No visible retractions or increased work of breathing.    SKIN: no suspicious lesions or rashes  PSYCH: Mentation appears normal, affect normal/bright, judgement and insight intact, normal speech and appearance well-groomed.        Copy to: No Ref-Primary, Physician      MOOSE RAMOS MD 4/7/2021       Total time spent with patient: 30 min >50% counseling and 10 minutes documenting and reviewing records

## 2021-04-06 NOTE — PATIENT INSTRUCTIONS
Your BMI is Body mass index is 31.06 kg/m .  Weight management is a personal decision.  If you are interested in exploring weight loss strategies, the following discussion covers the approaches that may be successful. Body mass index (BMI) is one way to tell whether you are at a healthy weight, overweight, or obese. It measures your weight in relation to your height.  A BMI of 18.5 to 24.9 is in the healthy range. A person with a BMI of 25 to 29.9 is considered overweight, and someone with a BMI of 30 or greater is considered obese. More than two-thirds of American adults are considered overweight or obese.  Being overweight or obese increases the risk for further weight gain. Excess weight may lead to heart disease and diabetes.  Creating and following plans for healthy eating and physical activity may help you improve your health.  Weight control is part of healthy lifestyle and includes exercise, emotional health, and healthy eating habits. Careful eating habits lifelong are the mainstay of weight control. Though there are significant health benefits from weight loss, long-term weight loss with diet alone may be very difficult to achieve- studies show long-term success with dietary management in less than 10% of people. Attaining a healthy weight may be especially difficult to achieve in those with severe obesity. In some cases, medications, devices and surgical management might be considered.  What can you do?  If you are overweight or obese and are interested in methods for weight loss, you should discuss this with your provider.     Consider reducing daily calorie intake by 500 calories.     Keep a food journal.     Avoiding skipping meals, consider cutting portions instead.    Diet combined with exercise helps maintain muscle while optimizing fat loss. Strength training is particularly important for building and maintaining muscle mass. Exercise helps reduce stress, increase energy, and improves fitness.  Increasing exercise without diet control, however, may not burn enough calories to loose weight.       Start walking three days a week 10-20 minutes at a time    Work towards walking thirty minutes five days a week     Eventually, increase the speed of your walking for 1-2 minutes at time    In addition, we recommend that you review healthy lifestyles and methods for weight loss available through the National Institutes of Health patient information sites:  http://win.niddk.nih.gov/publications/index.htm    And look into health and wellness programs that may be available through your health insurance provider, employer, local community center, or autumn club.

## 2021-04-07 ENCOUNTER — VIRTUAL VISIT (OUTPATIENT)
Dept: SLEEP MEDICINE | Facility: CLINIC | Age: 61
End: 2021-04-07
Payer: COMMERCIAL

## 2021-04-07 DIAGNOSIS — G47.33 OSA (OBSTRUCTIVE SLEEP APNEA): Primary | ICD-10-CM

## 2021-04-07 PROCEDURE — 99214 OFFICE O/P EST MOD 30 MIN: CPT | Mod: 95 | Performed by: INTERNAL MEDICINE

## 2021-04-08 LAB — SLPCOMP: NORMAL

## 2021-05-05 ENCOUNTER — TELEPHONE (OUTPATIENT)
Dept: SLEEP MEDICINE | Facility: CLINIC | Age: 61
End: 2021-05-05

## 2021-05-05 NOTE — TELEPHONE ENCOUNTER
I spoke with Issa today in an attempt to schedule home sleep test in July of this year. He is reluctant to schedule this. He states that he will call us when he is ready to schedule.     He will also need to schedule a 2 week virtual follow up visit with Dr. Foreman for test results.     Patient requested that I send him a [employer] [facility] message with our telephone number.     Maame CHERRY CMA, [facility], 5/5/2021 10:38 AM

## 2021-05-05 NOTE — TELEPHONE ENCOUNTER
----- Message from Jackelin Cooper CMA sent at 4/8/2021  7:21 AM CDT -----  Regarding: schedule HST approx 7/7/21 and return visit   Insurance: [employer]

## 2021-05-22 ENCOUNTER — ANCILLARY PROCEDURE (OUTPATIENT)
Dept: GENERAL RADIOLOGY | Facility: CLINIC | Age: 61
End: 2021-05-22
Attending: PHYSICIAN ASSISTANT
Payer: COMMERCIAL

## 2021-05-22 ENCOUNTER — OFFICE VISIT (OUTPATIENT)
Dept: URGENT CARE | Facility: URGENT CARE | Age: 61
End: 2021-05-22
Payer: COMMERCIAL

## 2021-05-22 VITALS
TEMPERATURE: 98.4 F | SYSTOLIC BLOOD PRESSURE: 122 MMHG | HEART RATE: 90 BPM | OXYGEN SATURATION: 99 % | WEIGHT: 230 LBS | DIASTOLIC BLOOD PRESSURE: 78 MMHG | BODY MASS INDEX: 31.15 KG/M2 | HEIGHT: 72 IN

## 2021-05-22 DIAGNOSIS — R10.9 LEFT FLANK PAIN: Primary | ICD-10-CM

## 2021-05-22 DIAGNOSIS — R10.9 LEFT FLANK PAIN: ICD-10-CM

## 2021-05-22 LAB
ALBUMIN UR-MCNC: NEGATIVE MG/DL
APPEARANCE UR: CLEAR
BASOPHILS # BLD AUTO: 0.1 10E9/L (ref 0–0.2)
BASOPHILS NFR BLD AUTO: 1.1 %
BILIRUB UR QL STRIP: NEGATIVE
COLOR UR AUTO: YELLOW
DIFFERENTIAL METHOD BLD: NORMAL
EOSINOPHIL # BLD AUTO: 0.3 10E9/L (ref 0–0.7)
EOSINOPHIL NFR BLD AUTO: 4.8 %
ERYTHROCYTE [DISTWIDTH] IN BLOOD BY AUTOMATED COUNT: 12 % (ref 10–15)
GLUCOSE UR STRIP-MCNC: NEGATIVE MG/DL
HCT VFR BLD AUTO: 40.5 % (ref 40–53)
HGB BLD-MCNC: 13.4 G/DL (ref 13.3–17.7)
HGB UR QL STRIP: NEGATIVE
KETONES UR STRIP-MCNC: NEGATIVE MG/DL
LEUKOCYTE ESTERASE UR QL STRIP: NEGATIVE
LYMPHOCYTES # BLD AUTO: 1.5 10E9/L (ref 0.8–5.3)
LYMPHOCYTES NFR BLD AUTO: 28.8 %
MCH RBC QN AUTO: 27.7 PG (ref 26.5–33)
MCHC RBC AUTO-ENTMCNC: 33.1 G/DL (ref 31.5–36.5)
MCV RBC AUTO: 84 FL (ref 78–100)
MONOCYTES # BLD AUTO: 0.6 10E9/L (ref 0–1.3)
MONOCYTES NFR BLD AUTO: 12.2 %
NEUTROPHILS # BLD AUTO: 2.8 10E9/L (ref 1.6–8.3)
NEUTROPHILS NFR BLD AUTO: 53.1 %
NITRATE UR QL: NEGATIVE
PH UR STRIP: 5.5 PH (ref 5–7)
PLATELET # BLD AUTO: 277 10E9/L (ref 150–450)
RBC # BLD AUTO: 4.84 10E12/L (ref 4.4–5.9)
SOURCE: NORMAL
SP GR UR STRIP: >1.03 (ref 1–1.03)
UROBILINOGEN UR STRIP-ACNC: 0.2 EU/DL (ref 0.2–1)
WBC # BLD AUTO: 5.2 10E9/L (ref 4–11)

## 2021-05-22 PROCEDURE — 74019 RADEX ABDOMEN 2 VIEWS: CPT | Performed by: RADIOLOGY

## 2021-05-22 PROCEDURE — 85025 COMPLETE CBC W/AUTO DIFF WBC: CPT | Performed by: PHYSICIAN ASSISTANT

## 2021-05-22 PROCEDURE — 99204 OFFICE O/P NEW MOD 45 MIN: CPT | Performed by: PHYSICIAN ASSISTANT

## 2021-05-22 PROCEDURE — 81003 URINALYSIS AUTO W/O SCOPE: CPT | Performed by: PHYSICIAN ASSISTANT

## 2021-05-22 PROCEDURE — 36415 COLL VENOUS BLD VENIPUNCTURE: CPT | Performed by: PHYSICIAN ASSISTANT

## 2021-05-22 RX ORDER — HYDROCODONE BITARTRATE AND ACETAMINOPHEN 5; 325 MG/1; MG/1
1 TABLET ORAL EVERY 6 HOURS PRN
Qty: 8 TABLET | Refills: 0 | Status: SHIPPED | OUTPATIENT
Start: 2021-05-22 | End: 2021-05-22

## 2021-05-22 RX ORDER — TAMSULOSIN HYDROCHLORIDE 0.4 MG/1
0.4 CAPSULE ORAL DAILY
Qty: 20 CAPSULE | Refills: 0 | Status: SHIPPED | OUTPATIENT
Start: 2021-05-22 | End: 2022-04-07

## 2021-05-22 RX ORDER — NALTREXONE HYDROCHLORIDE AND BUPROPION HYDROCHLORIDE 8; 90 MG/1; MG/1
TABLET, EXTENDED RELEASE ORAL
COMMUNITY
Start: 2021-04-19 | End: 2021-08-02

## 2021-05-22 ASSESSMENT — MIFFLIN-ST. JEOR: SCORE: 1896.27

## 2021-05-22 NOTE — PATIENT INSTRUCTIONS
We will treat you expectantly for a kidney stone.   I want you to push fluids and try to rest. Call your PCP on Monday for a follow-up appointment.   Strain your urine to watch for stone passage.   OK to take Ibuprofen for pain.   If you develop any of these you need to be seen right away in the ER:    Pain that is not controlled by the medicine given    Repeated vomiting or unable to keep down fluids    Fever of 100.4 F (38 C) or higher, or as directed by your healthcare provider    Passage of solid red or brown urine (can't see through it) or urine with lots of blood clots    Foul-smelling or cloudy urine    Unable to pass urine for 8 hours and increasing bladder pressure    Severe abdominal or testicular pain      Patient Education     Kidney Stone with Pain    The sharp cramping pain on either side of your lower back and nausea or vomiting that you have are because of a small stone that has formed in the kidney. It's now passing down a narrow tube (ureter) on its way to your bladder. Once the stone reaches your bladder, the pain will often stop. But it may come back as the stone continues to pass out of the bladder and through the urethra. The stone may pass in your urine stream in one piece. The size may be 1/16 inch to 1/4 inch (1 mm to 6 mm). Or, the stone may break up into drew fragments that you may not even notice.   Once you have had a kidney stone, you are at risk of getting another one in the future. There are 4 types of kidney stones. Eighty percent are calcium stones--mostly calcium oxalate but also some with calcium phosphate. The other 3 types include uric acid stones, struvite stones (from a preceding infection), and rarely, cystine stones.   Most stones will pass on their own, but may take from a few hours to a few days. Sometimes the stone is too large to pass by itself. In that case, the healthcare provider will need to use other ways to remove the stone. These techniques include:      Lithotripsy. This uses ultrasound waves to break up the stone.    Ureteroscopy. This pushes a basket-like instrument through the urethra and bladder and into the ureter to pull out the stone.    Surgery. You may need surgery to remove the stone.  Home care  The following are general care guidelines:    Drink plenty of fluids. This means at least 12, 8-ounce glasses of fluid--mostly water--a day.    Each time you urinate, do so in a jar. Pour the urine from the jar through the strainer and into the toilet. Continue doing this until 24 hours after your pain stops. By then, if there was a kidney stone, it should pass from your bladder. Some stones dissolve into sand-like particles and pass right through the strainer. In that case, you won t ever see a stone.    Save any stone that you find in the strainer and bring it to your healthcare provider to look at. It may be possible to stop certain types of stones from forming. For this reason, it's important to know what kind of stone you have.    Try to stay as active as possible. This will help the stone pass. Don't stay in bed unless your pain keeps you from getting up. You may notice a red, pink, or brown color to your urine. This is normal while passing a kidney stone.    If you develop pain, you may take ibuprofen or naproxen for pain, unless another medicine was prescribed. Talk with your healthcare provider before using these medicines if you have chronic liver or kidney disease. Also talk with your provider if you've had a stomach ulcer or digestive bleeding.  Preventing stones  Each year for the next 5 to 7 years, you are at risk that a new stone will form. Your risk is a 50% chance over this time period. The risk is higher if you have a family history of kidney stones or have certain chronic illnesses like high blood pressure, obesity, or diabetes. But you can make changes to your lifestyle and diet that can lower your risk for another stone.   Most kidney  stones are made of calcium. The following is advice for preventing another calcium stone. If you don t know the type of stone you have, follow this advice until the cause of your stone is found.   Things that help:    The most important thing you can do is to drink plenty of fluids each day. See home care above.     Eat foods that contain phytates. These include wheat, rice, rye, barley, and beans. Phytates are substances that may lower your risk for any type of stone to form.    Eat more fruits and vegetables. Choose those that are high in potassium.    Eat foods high in natural citrate such as fruit and low-sugar fruit juices, such as lemonade and orange juice. Citrate can protect against kidney stones because it stops crystals from turning into stones    Having too little calcium in your diet can put you at risk for calcium kidney stones. Eat a normal amount of calcium in your diet and talk with your healthcare provider if you are taking calcium supplements. Cutting back on your calcium intake may raise your risk. New research shows that eating calcium-rich and oxalate-rich foods together lowers your risk for stones by binding the minerals in the stomach and intestines before they can reach the kidneys.      Limit salt intake to 2 grams (1 teaspoon) per day. High sodium in your diet will increase the amount of calcium sent into your urine. This can increase your chances of developing another stone. Use limited amounts when cooking, and don t add salt at the table. Processed and canned foods are usually high in salt.     Spinach, rhubarb, peanuts, cashews, almonds, grapefruit, and grapefruit juice are all high oxalate foods. You should limit how much of these you eat. Or eat them with calcium-rich foods. These include dairy products, dark leafy greens, soy products, and calcium-enriched foods.    Reducing the amount of animal meat and high protein foods in your diet may lower your risk for uric acid stones. These  foods have high amounts of a natural chemical compound called purines. Eating a lot of food with purines can make your body produce more uric acid.    Limit the amount of sugar (sucrose) and soft drinks with fructose in your diet.     If you take vitamin C as a supplement, don't take more than 1,000 mg a day.    A dietitian or your healthcare provider can give you information about changes in your diet that will help prevent more kidney stones from forming.  Follow-up care  Follow up with your healthcare provider, or as advised, if the pain lasts more than 48 hours. Talk with your provider about urine and blood tests to find out the cause of your stone. If you had an X-ray, CT scan, or other diagnostic test, you will be told of any new findings that may affect your care.   Call 911  Call 911 if you have:     Weakness, dizziness, or fainting  When to seek medical advice  Call your healthcare provider right away if any of these occur:    Pain that is not controlled by the medicine given    Repeated vomiting or unable to keep down fluids    Fever of 100.4 F (38 C) or higher, or as directed by your healthcare provider    Passage of solid red or brown urine (can't see through it) or urine with lots of blood clots    Foul-smelling or cloudy urine    Unable to pass urine for 8 hours and increasing bladder pressure  [employer] last reviewed this educational content on 3/1/2020    [id number] [employer]. All rights reserved. This information is not intended as a substitute for professional medical care. Always follow your healthcare professional's instructions.           Patient Education     Kidney Stone with Pain    The sharp cramping pain on either side of your lower back and nausea or vomiting that you have are because of a small stone that has formed in the kidney. It's now passing down a narrow tube (ureter) on its way to your bladder. Once the stone reaches your bladder, the pain will often stop. But it may  come back as the stone continues to pass out of the bladder and through the urethra. The stone may pass in your urine stream in one piece. The size may be 1/16 inch to 1/4 inch (1 mm to 6 mm). Or, the stone may break up into drew fragments that you may not even notice.   Once you have had a kidney stone, you are at risk of getting another one in the future. There are 4 types of kidney stones. Eighty percent are calcium stones--mostly calcium oxalate but also some with calcium phosphate. The other 3 types include uric acid stones, struvite stones (from a preceding infection), and rarely, cystine stones.   Most stones will pass on their own, but may take from a few hours to a few days. Sometimes the stone is too large to pass by itself. In that case, the healthcare provider will need to use other ways to remove the stone. These techniques include:     Lithotripsy. This uses ultrasound waves to break up the stone.    Ureteroscopy. This pushes a basket-like instrument through the urethra and bladder and into the ureter to pull out the stone.    Surgery. You may need surgery to remove the stone.  Home care  The following are general care guidelines:    Drink plenty of fluids. This means at least 12, 8-ounce glasses of fluid--mostly water--a day.    Each time you urinate, do so in a jar. Pour the urine from the jar through the strainer and into the toilet. Continue doing this until 24 hours after your pain stops. By then, if there was a kidney stone, it should pass from your bladder. Some stones dissolve into sand-like particles and pass right through the strainer. In that case, you won t ever see a stone.    Save any stone that you find in the strainer and bring it to your healthcare provider to look at. It may be possible to stop certain types of stones from forming. For this reason, it's important to know what kind of stone you have.    Try to stay as active as possible. This will help the stone pass. Don't stay in  bed unless your pain keeps you from getting up. You may notice a red, pink, or brown color to your urine. This is normal while passing a kidney stone.    If you develop pain, you may take ibuprofen or naproxen for pain, unless another medicine was prescribed. Talk with your healthcare provider before using these medicines if you have chronic liver or kidney disease. Also talk with your provider if you've had a stomach ulcer or digestive bleeding.  Preventing stones  Each year for the next 5 to 7 years, you are at risk that a new stone will form. Your risk is a 50% chance over this time period. The risk is higher if you have a family history of kidney stones or have certain chronic illnesses like high blood pressure, obesity, or diabetes. But you can make changes to your lifestyle and diet that can lower your risk for another stone.   Most kidney stones are made of calcium. The following is advice for preventing another calcium stone. If you don t know the type of stone you have, follow this advice until the cause of your stone is found.   Things that help:    The most important thing you can do is to drink plenty of fluids each day. See home care above.     Eat foods that contain phytates. These include wheat, rice, rye, barley, and beans. Phytates are substances that may lower your risk for any type of stone to form.    Eat more fruits and vegetables. Choose those that are high in potassium.    Eat foods high in natural citrate such as fruit and low-sugar fruit juices, such as lemonade and orange juice. Citrate can protect against kidney stones because it stops crystals from turning into stones    Having too little calcium in your diet can put you at risk for calcium kidney stones. Eat a normal amount of calcium in your diet and talk with your healthcare provider if you are taking calcium supplements. Cutting back on your calcium intake may raise your risk. New research shows that eating calcium-rich and  oxalate-rich foods together lowers your risk for stones by binding the minerals in the stomach and intestines before they can reach the kidneys.      Limit salt intake to 2 grams (1 teaspoon) per day. High sodium in your diet will increase the amount of calcium sent into your urine. This can increase your chances of developing another stone. Use limited amounts when cooking, and don t add salt at the table. Processed and canned foods are usually high in salt.     Spinach, rhubarb, peanuts, cashews, almonds, grapefruit, and grapefruit juice are all high oxalate foods. You should limit how much of these you eat. Or eat them with calcium-rich foods. These include dairy products, dark leafy greens, soy products, and calcium-enriched foods.    Reducing the amount of animal meat and high protein foods in your diet may lower your risk for uric acid stones. These foods have high amounts of a natural chemical compound called purines. Eating a lot of food with purines can make your body produce more uric acid.    Limit the amount of sugar (sucrose) and soft drinks with fructose in your diet.     If you take vitamin C as a supplement, don't take more than 1,000 mg a day.    A dietitian or your healthcare provider can give you information about changes in your diet that will help prevent more kidney stones from forming.  Follow-up care  Follow up with your healthcare provider, or as advised, if the pain lasts more than 48 hours. Talk with your provider about urine and blood tests to find out the cause of your stone. If you had an X-ray, CT scan, or other diagnostic test, you will be told of any new findings that may affect your care.   Call 911  Call 911 if you have:     Weakness, dizziness, or fainting  When to seek medical advice  Call your healthcare provider right away if any of these occur:    Pain that is not controlled by the medicine given    Repeated vomiting or unable to keep down fluids    Fever of 100.4 F (38 C) or  higher, or as directed by your healthcare provider    Passage of solid red or brown urine (can't see through it) or urine with lots of blood clots    Foul-smelling or cloudy urine    Unable to pass urine for 8 hours and increasing bladder pressure  [employer] last reviewed this educational content on 3/1/2020    9928-1695 [employer]. All rights reserved. This information is not intended as a substitute for professional medical care. Always follow your healthcare professional's instructions.

## 2021-05-22 NOTE — PROGRESS NOTES
Assessment & Plan     1. Left flank pain  60-year-old male presents the clinic for evaluation of left flank pain for the past 3 days.  On exam he has intermittent episodes of discomfort.  He does not have tenderness of his flank.  No abdominal or paraspinal tenderness.  Pain is reproduced with palpation or movement.  UA, x-ray and CBC are all within normal limits.  Discussed with patient that I suspect his symptoms are related to kidney stones.  Patient opted to treat outpatient versus going to the emergency department for CT scan to better characterize pain.  Advised patient to have low threshold for follow-up including fever, chills, vomiting, worsening pain or severe abdominal or testicular pain.  Patient was given a strainer to strain urine and look for stone.  Follow-up with PCP on Monday.  - CBC with platelets and differential  - XR Abdomen 2 Views; Future  - *UA reflex to Microscopic and Culture ([place] [facility] (except [place] and [place])      Return in about 2 days (around 5/24/2021), or if symptoms worsen or fail to improve.    Diagnosis and treatment plan was reviewed with patient and/or family.   We went over any labs or imaging. Discussed worsening symptoms or little to no relief despite treatment plan to follow-up in ER.  Patient verbalizes understanding. All questions were addressed and answered.     CORRIE Carrero [facility] [place]    CHIEF COMPLAINT:   Chief Complaint   Patient presents with     Urgent Care     Pt in clinic to have eval for groin pain that radiates into left leg/knee.  Pt is also c/o left side back pain.     Back Pain     Groin Pain     Subjective     Issa Carlson is a 60 year old male who presents to clinic today for evaluation of flank pain.  Patient reports the pain started 8 days ago.  He was on vacation in [place] where he was golfing and fishing.  Pain seems to come out of the blue and waxes and wanes.  Currently rates the  pain as a 6 out of 10, but will go up to a 10.  Pain improves with laying down and worsens with increased activity.  Coughing, laughing or sneezing make the pain worse.  Does not radiate or wrap around to his stomach.  He has taken Aleve for his symptoms without improvement.  Additionally, for the past 3 to 4 days has had left testicular pain.  Testicle is not tender to the touch, but feels pain in that general area. Feels like he may have slept on it sat on it wrong.  Pain radiates from groin to knee.  He denies having fever, chills, hematuria, dysuria, nausea, vomiting or weakness.      Past Medical History:   Diagnosis Date     Acute prostatitis 12/2004     Calculus of kidney 1994    no recurrence     Cervical radiculopathy 10/04/2016    R side     Class 1 obesity due to excess calories without serious comorbidity with body mass index (BMI) of 31.0 to 31.9 in adult 07/06/2020     Coronary artery disease due to lipid rich plaque     coronary arteryt calcium score of 22, 63rd percentile in 2016     Dry eye syndrome      Hypertension      Idiopathic urticaria      Impotence of organic origin      Insomnia      Mild intermittent asthma 1975    hx in childhood     Mixed hyperlipidemia      Obesity      HANNAH (obstructive sleep apnea)      Other acne      Prostatitis      Seborrhea capitis      Tinnitus      Varicella without mention of complication      Past Surgical History:   Procedure Laterality Date     COLONOSCOPY  2012     DENTAL SURGERY  1980    wisdom teeth     ENDOSCOPY DRUG INDUCED SLEEP N/A 6/3/2020    Procedure: DRUG-INDUCED SLEEP ENDOSCOPY;  Surgeon: Gladys Marroquin MD;  Location:  OR     IMPLANT GENERATOR STIMULATOR (LOCATION) Right 10/28/2020    Procedure: INSERTION, PULSE GENERATOR, NEUROSTIMULATOR;  Surgeon: Gladys Marroquin MD;  [place] [facility] [place] [facility]     PHOTOREFRACTIVE KERATECTOMY  2000    s/p Lasik surgery     TONSILLECTOMY  1971    s/p Tonsillectomy     Social History     Tobacco  Use     Smoking status: Never Smoker     Smokeless tobacco: Never Used     Tobacco comment: cigar occ   Substance Use Topics     Alcohol use: Yes     Frequency: 2-4 times a month     Comment: 2-4 beers per week     Current Outpatient Medications   Medication     albuterol (PROAIR HFA/PROVENTIL HFA/VENTOLIN HFA) 108 (90 Base) MCG/ACT inhaler     aspirin 81 MG tablet     fenofibrate (TRICOR) 145 MG tablet     losartan (COZAAR) 50 MG tablet     pravastatin (PRAVACHOL) 40 MG tablet     tamsulosin (FLOMAX) 0.4 MG capsule     traZODone (DESYREL) 50 MG tablet     CONTRAVE 8-90 MG per 12 hr tablet     zolpidem (AMBIEN) 5 MG tablet     No current facility-administered medications for this visit.      Allergies   Allergen Reactions     Liraglutide GI Disturbance     Saxenda      Vitamin D3 [Cholecalciferol] Rash     Hmg-Coa-R Inhibitors      Myalgias      Statins       Azithromycin Rash     Skin peeling       10 point ROS of systems were all negative except for pertinent positives noted in my HPI.      Exam:   /78   Pulse 90   Temp 98.4  F (36.9  C)   Ht 1.829 m (6')   Wt 104.3 kg (230 lb)   SpO2 99%   BMI 31.19 kg/m    Constitutional: alert and no distress  Head: Normocephalic, atraumatic.  ENT: MMM  Neck: neck is supple, no cervical lymphadenopathy or nuchal rigidity  Cardiovascular: RRR  Respiratory: CTA bilaterally, no rhonchi or rales  Gastrointestinal: soft and nontender. NO rebound, guarding or rigidity.   BACK: No flank pain noted. He does not have paraspinal muscle TTP.   Groin: NO pain, mass or swelling in testicles. NO hernia.   Skin: no rashes  Neurologic: Speech clear, gait normal. Strength in lower extremities B/L.     Results for orders placed or performed in visit on 05/22/21   XR Abdomen 2 Views     Status: None (Preliminary result)    Narrative    ABDOMEN TWO TO THREE VIEWS  5/22/2021 11:33 AM     HISTORY: Left-sided flank pain. Rule out stone.    COMPARISON: None.    FINDINGS: No definite  urolithiasis. Right-sided probable phleboliths.  _______ amount of stool. No free air. There are no air filled  distended loops of small bowel. The colon is not distended. The lung  bases are unremarkable.      Impression    IMPRESSION: Nonobstructed bowel gas pattern.   Results for orders placed or performed in visit on 05/22/21   CBC with platelets and differential     Status: None   Result Value Ref Range    WBC 5.2 4.0 - 11.0 10e9/L    RBC Count 4.84 4.4 - 5.9 10e12/L    Hemoglobin 13.4 13.3 - 17.7 g/dL    Hematocrit 40.5 40.0 - 53.0 %    MCV 84 78 - 100 fl    MCH 27.7 26.5 - 33.0 pg    MCHC 33.1 31.5 - 36.5 g/dL    RDW 12.0 10.0 - 15.0 %    Platelet Count 277 150 - 450 10e9/L    % Neutrophils 53.1 %    % Lymphocytes 28.8 %    % Monocytes 12.2 %    % Eosinophils 4.8 %    % Basophils 1.1 %    Absolute Neutrophil 2.8 1.6 - 8.3 10e9/L    Absolute Lymphocytes 1.5 0.8 - 5.3 10e9/L    Absolute Monocytes 0.6 0.0 - 1.3 10e9/L    Absolute Eosinophils 0.3 0.0 - 0.7 10e9/L    Absolute Basophils 0.1 0.0 - 0.2 10e9/L    Diff Method Automated Method    *UA reflex to Microscopic and Culture ([facility] (except [place] and [place])     Status: None    Specimen: Midstream Urine   Result Value Ref Range    Color Urine Yellow     Appearance Urine Clear     Glucose Urine Negative NEG^Negative mg/dL    Bilirubin Urine Negative NEG^Negative    Ketones Urine Negative NEG^Negative mg/dL    Specific Gravity Urine >1.030 1.003 - 1.035    Blood Urine Negative NEG^Negative    pH Urine 5.5 5.0 - 7.0 pH    Protein Albumin Urine Negative NEG^Negative mg/dL    Urobilinogen Urine 0.2 0.2 - 1.0 EU/dL    Nitrite Urine Negative NEG^Negative    Leukocyte Esterase Urine Negative NEG^Negative    Source Midstream Urine

## 2021-06-06 RX ORDER — NALTREXONE HYDROCHLORIDE AND BUPROPION HYDROCHLORIDE 8; 90 MG/1; MG/1
TABLET, EXTENDED RELEASE ORAL
Qty: 120 TABLET | Status: CANCELLED | OUTPATIENT
Start: 2021-06-06

## 2021-06-09 NOTE — TELEPHONE ENCOUNTER
Last Clinic Visit: 1/6/21 recommended 3 month follow up  Call to [facility] pharmacy after receiving paper refill request, message left of refill being declined, needs to be seen in clinic for further refills    Recieved refill request for CONTRAVE 8-90 MG per 12 hr tablet . Patient needs appointment scheduled prior to any refills. Clinic Coordinator notified and will follow up with the patient as appropriate. The pharmacy has been notified that the medication will not be refilled prior to an appointment being scheduled.

## 2021-08-02 ENCOUNTER — TELEPHONE (OUTPATIENT)
Dept: ENDOCRINOLOGY | Facility: CLINIC | Age: 61
End: 2021-08-02

## 2021-08-02 ENCOUNTER — VIRTUAL VISIT (OUTPATIENT)
Dept: ENDOCRINOLOGY | Facility: CLINIC | Age: 61
End: 2021-08-02
Payer: COMMERCIAL

## 2021-08-02 VITALS — HEIGHT: 72 IN | BODY MASS INDEX: 31.42 KG/M2 | WEIGHT: 232 LBS

## 2021-08-02 DIAGNOSIS — E66.3 OVERWEIGHT (BMI 25.0-29.9): Primary | ICD-10-CM

## 2021-08-02 PROCEDURE — 99212 OFFICE O/P EST SF 10 MIN: CPT | Mod: 95 | Performed by: PHYSICIAN ASSISTANT

## 2021-08-02 RX ORDER — NALTREXONE HYDROCHLORIDE AND BUPROPION HYDROCHLORIDE 8; 90 MG/1; MG/1
TABLET, EXTENDED RELEASE ORAL
Qty: 120 TABLET | Refills: 0 | Status: SHIPPED | OUTPATIENT
Start: 2021-08-02 | End: 2021-09-15

## 2021-08-02 ASSESSMENT — MIFFLIN-ST. JEOR: SCORE: 1905.35

## 2021-08-02 ASSESSMENT — PAIN SCALES - GENERAL: PAINLEVEL: NO PAIN (0)

## 2021-08-02 NOTE — TELEPHONE ENCOUNTER
8/2 left VM for staff msg:  Mary Childers PA-C P [facility] Bariatric Scheduling Registration Pool  Follow up in 3 months Mary Childers   RD soon or in 3 months, patient preference

## 2021-08-02 NOTE — LETTER
2021       RE: Speedy Carlson  [address] [facility] [address]     Dear Colleague,    Thank you for referring your patient, Speedy Carlson, to the [facility] WEIGHT MANAGEMENT CLINIC [place] at [facility]. Please see a copy of my visit note below.    Issa is a 60 year old who is being evaluated via a billable video visit.      How would you like to obtain your AVS? MyChart  If the video visit is dropped, the invitation should be resent by: Text to cell phone: [phone]  Will anyone else be joining your video visit? No    Video Start Time: 11:17 AM     Video-Visit Details    Type of service:  Video Visit    Video End Time:1130    Originating Location (pt. Location): Home    Distant Location (provider location):  [facility] WE[facility] MANAGEMENT CLINIC [place]     Platform used for Video Visit: [place]       [facility] Medical Weight Management Note     Speedy Carlson  MRN:  [id number]  :  1960  CAMILO:  2021    Dear Physician No Ref-Primary,    I had the pleasure of seeing your patient Speedy Carlson. He is a 60 year old male who I am continuing to see for treatment of obesity related to:       2019   I have the following health issues associated with obesity: Heart Disease, High Blood Pressure, High Cholesterol, Sleep Apnea   I have the following symptoms associated with obesity: None of the above     15 minutes spent on the date of the encounter doing chart review, history and exam, documentation and further activities per the note    INTERVAL HISTORY:  [facility] follow up  Lost from 252 to 220 and then regained to 232 recently  Ran out of Contrave 1 month ago and noticed increased hunger    AOM:  Saxenda/ozempic: GI upset, abdominal cramping  Topiramate: anxiety, shaky  Phentermine: not appropriate due to family cardiac history     PLAN:  Restart contrave ramp up to two twice daily again  Labs at any [facility] lab. To find  a lab location near you, please call [phone].  Follow up in 3 months Mary Childers  Follow up RD in 3 months       CURRENT WEIGHT:   232 lbs 0 oz    Initial Weight (lbs): 252.3 lbs  Last Visits Weight: 100.1 kg (220 lb 9.6 oz)  Cumulative weight loss (lbs): 20.3  Weight Loss Percentage: 8.05%    Changes and Difficulties 7/30/2021   I have made the following changes to my diet since my last visit: Less pasta   With regards to my diet, I am still struggling with: portion control   I have made the following changes to my activity/exercise since my last visit: Unfortunately no change   With regards to my activity/exercise, I am still struggling with: Finding time       VITALS:  Ht 1.829 m (6')   Wt 105.2 kg (232 lb)   BMI 31.46 kg/m      MEDICATIONS:   Current Outpatient Medications   Medication Sig Dispense Refill     albuterol (PROAIR HFA/PROVENTIL HFA/VENTOLIN HFA) 108 (90 Base) MCG/ACT inhaler Inhale 1-2 puffs into the lungs       aspirin 81 MG tablet Take 81 mg by mouth daily       CONTRAVE 8-90 MG per 12 hr tablet TAKE 2 TABLETS BY MOUTH TWICE DAILY       fenofibrate (TRICOR) 145 MG tablet Take 145 mg by mouth       losartan (COZAAR) 50 MG tablet Take 1 tablet (50 mg) by mouth daily  0     pravastatin (PRAVACHOL) 40 MG tablet Take 40 mg by mouth daily        tamsulosin (FLOMAX) 0.4 MG capsule Take 1 capsule (0.4 mg) by mouth daily 20 capsule 0     traZODone (DESYREL) 50 MG tablet Take 150 mg by mouth At Bedtime        zolpidem (AMBIEN) 5 MG tablet Take 5 mg by mouth nightly as needed for sleep          Weight Loss Medication History Reviewed With Patient 7/30/2021   Which weight loss medications are you currently taking on a regular basis?  Contrave (naltrexone/bupropion)   Are you having any side effects from the weight loss medication that we have prescribed you? No   If you are having side effects please describe: -       PHYSICAL EXAM:  Objective    Ht 1.829 m (6')   Wt 105.2 kg (232 lb)   BMI 31.46  kg/m    Vitals - Patient Reported  Pain Score: No Pain (0)        Physical Exam   GENERAL: Healthy, alert and no distress  EYES: Eyes grossly normal to inspection.  No discharge or erythema, or obvious scleral/conjunctival abnormalities.  RESP: No audible wheeze, cough, or visible cyanosis.  No visible retractions or increased work of breathing.    SKIN: Visible skin clear. No significant rash, abnormal pigmentation or lesions.  NEURO: Cranial nerves grossly intact.  Mentation and speech appropriate for age.  PSYCH: Mentation appears normal, affect normal/bright, judgement and insight intact, normal speech and appearance well-groomed.      Sincerely,    Mary Childers PA-C

## 2021-08-02 NOTE — PROGRESS NOTES
Issa is a 60 year old who is being evaluated via a billable video visit.      How would you like to obtain your AVS? MyChart  If the video visit is dropped, the invitation should be resent by: Text to cell phone: [phone]  Will anyone else be joining your video visit? No    Video Start Time: 11:17 AM     Video-Visit Details    Type of service:  Video Visit    Video End Time:1130    Originating Location (pt. Location): Home    Distant Location (provider location):  [facility] WEIGHT MANAGEMENT CLINIC [place]     Platform used for Video Visit: [employer]       [facility] Medical Weight Management Note     Speedy Carlson  MRN:  [id number]  :  1960  CAMILO:  2021    Dear Physician No Ref-Primary,    I had the pleasure of seeing your patient Speedy Carlson. He is a 60 year old male who I am continuing to see for treatment of obesity related to:       2019   I have the following health issues associated with obesity: Heart Disease, High Blood Pressure, High Cholesterol, Sleep Apnea   I have the following symptoms associated with obesity: None of the above     15 minutes spent on the date of the encounter doing chart review, history and exam, documentation and further activities per the note    INTERVAL HISTORY:  [facility] follow up  Lost from 252 to 220 and then regained to 232 recently  Ran out of Contrave 1 month ago and noticed increased hunger    AOM:  Saxenda/ozempic: GI upset, abdominal cramping  Topiramate: anxiety, shaky  Phentermine: not appropriate due to family cardiac history     PLAN:  Restart contrave ramp up to two twice daily again  Labs at any [facility] lab. To find a lab location near you, please call [phone].  Follow up in 3 months Mary Childers  Follow up RD in 3 months       CURRENT WEIGHT:   232 lbs 0 oz    Initial Weight (lbs): 252.3 lbs  Last Visits Weight: 100.1 kg (220 lb 9.6 oz)  Cumulative weight loss (lbs): 20.3  Weight Loss Percentage: 8.05%    Changes and Difficulties  7/30/2021   I have made the following changes to my diet since my last visit: Less pasta   With regards to my diet, I am still struggling with: portion control   I have made the following changes to my activity/exercise since my last visit: Unfortunately no change   With regards to my activity/exercise, I am still struggling with: Finding time       VITALS:  Ht 1.829 m (6')   Wt 105.2 kg (232 lb)   BMI 31.46 kg/m      MEDICATIONS:   Current Outpatient Medications   Medication Sig Dispense Refill     albuterol (PROAIR HFA/PROVENTIL HFA/VENTOLIN HFA) 108 (90 Base) MCG/ACT inhaler Inhale 1-2 puffs into the lungs       aspirin 81 MG tablet Take 81 mg by mouth daily       CONTRAVE 8-90 MG per 12 hr tablet TAKE 2 TABLETS BY MOUTH TWICE DAILY       fenofibrate (TRICOR) 145 MG tablet Take 145 mg by mouth       losartan (COZAAR) 50 MG tablet Take 1 tablet (50 mg) by mouth daily  0     pravastatin (PRAVACHOL) 40 MG tablet Take 40 mg by mouth daily        tamsulosin (FLOMAX) 0.4 MG capsule Take 1 capsule (0.4 mg) by mouth daily 20 capsule 0     traZODone (DESYREL) 50 MG tablet Take 150 mg by mouth At Bedtime        zolpidem (AMBIEN) 5 MG tablet Take 5 mg by mouth nightly as needed for sleep          Weight Loss Medication History Reviewed With Patient 7/30/2021   Which weight loss medications are you currently taking on a regular basis?  Contrave (naltrexone/bupropion)   Are you having any side effects from the weight loss medication that we have prescribed you? No   If you are having side effects please describe: -       PHYSICAL EXAM:  Objective    Ht 1.829 m (6')   Wt 105.2 kg (232 lb)   BMI 31.46 kg/m    Vitals - Patient Reported  Pain Score: No Pain (0)        Physical Exam   GENERAL: Healthy, alert and no distress  EYES: Eyes grossly normal to inspection.  No discharge or erythema, or obvious scleral/conjunctival abnormalities.  RESP: No audible wheeze, cough, or visible cyanosis.  No visible retractions or increased  work of breathing.    SKIN: Visible skin clear. No significant rash, abnormal pigmentation or lesions.  NEURO: Cranial nerves grossly intact.  Mentation and speech appropriate for age.  PSYCH: Mentation appears normal, affect normal/bright, judgement and insight intact, normal speech and appearance well-groomed.      Sincerely,    Mary Childers PA-C

## 2021-08-02 NOTE — NURSING NOTE
Chief Complaint   Patient presents with     Follow Up     Follow-up Weight Management       Vitals:    08/02/21 1040   Weight: 105.2 kg (232 lb)   Height: 1.829 m (6')       Body mass index is 31.46 kg/m .

## 2021-08-18 ENCOUNTER — LAB (OUTPATIENT)
Dept: LAB | Facility: CLINIC | Age: 61
End: 2021-08-18
Payer: COMMERCIAL

## 2021-08-18 DIAGNOSIS — E66.3 OVERWEIGHT (BMI 25.0-29.9): ICD-10-CM

## 2021-08-18 PROCEDURE — 80053 COMPREHEN METABOLIC PANEL: CPT

## 2021-08-18 PROCEDURE — 36415 COLL VENOUS BLD VENIPUNCTURE: CPT

## 2021-08-20 LAB
ALBUMIN SERPL-MCNC: 4.3 G/DL (ref 3.4–5)
ALP SERPL-CCNC: 50 U/L (ref 40–150)
ALT SERPL W P-5'-P-CCNC: 43 U/L (ref 0–70)
ANION GAP SERPL CALCULATED.3IONS-SCNC: 1 MMOL/L (ref 3–14)
AST SERPL W P-5'-P-CCNC: 29 U/L (ref 0–45)
BILIRUB SERPL-MCNC: 0.9 MG/DL (ref 0.2–1.3)
BUN SERPL-MCNC: 19 MG/DL (ref 7–30)
CALCIUM SERPL-MCNC: 9.6 MG/DL (ref 8.5–10.1)
CHLORIDE BLD-SCNC: 105 MMOL/L (ref 94–109)
CO2 SERPL-SCNC: 30 MMOL/L (ref 20–32)
CREAT SERPL-MCNC: 1.21 MG/DL (ref 0.66–1.25)
GFR SERPL CREATININE-BSD FRML MDRD: 65 ML/MIN/1.73M2
GLUCOSE BLD-MCNC: 82 MG/DL (ref 70–99)
POTASSIUM BLD-SCNC: 5 MMOL/L (ref 3.4–5.3)
PROT SERPL-MCNC: 7.3 G/DL (ref 6.8–8.8)
SODIUM SERPL-SCNC: 136 MMOL/L (ref 133–144)

## 2021-09-04 ENCOUNTER — HEALTH MAINTENANCE LETTER (OUTPATIENT)
Age: 61
End: 2021-09-04

## 2021-09-13 DIAGNOSIS — E66.3 OVERWEIGHT (BMI 25.0-29.9): ICD-10-CM

## 2021-09-15 RX ORDER — NALTREXONE HYDROCHLORIDE AND BUPROPION HYDROCHLORIDE 8; 90 MG/1; MG/1
TABLET, EXTENDED RELEASE ORAL
Qty: 120 TABLET | Refills: 1 | Status: SHIPPED | OUTPATIENT
Start: 2021-09-15 | End: 2021-11-10

## 2021-09-15 NOTE — TELEPHONE ENCOUNTER
CONTRAVE ER 8-90MG TABLETS  Last Written Prescription Date: 8/2/2021  Last Fill Quantity: 120,   # refills: 0  Last Office Visit : 8/2/2021  Future Office visit:  11/24/2021    Routing refill request to provider for review/approval because:  Drug not on the [facility], [facility] or [facility] refill protocol or controlled substance      Melani Rey RN  Central Triage Red Flags/Med Refills

## 2021-10-04 ENCOUNTER — FCC EXTENDED DOCUMENTATION (OUTPATIENT)
Dept: PSYCHOLOGY | Facility: CLINIC | Age: 61
End: 2021-10-04

## 2021-10-04 NOTE — PROGRESS NOTES
Discharge Summary  Multiple Sessions    Client Name: Speedy Carlson MRN#: [id number] YOB: 1960      Intake / Discharge Date: 12-03-21   /   10-04-21      DSM5 Diagnoses: (Sustained by DSM5 Criteria Listed Above)  Diagnoses: f43.22  Psychosocial & Contextual Factors:anxiety adjusting to unwanted health changes  WHODAS 2.0 (12 item) Score: na          Presenting Concern:  anxiety adjusting to unwanted health changes    Reason for Discharge:  Client is satisfied with progress      Disposition at Time of Last Encounter:   Comments:   none     Risk Management:   Client denies a history of suicidal ideation, suicide attempts, self-injurious behavior, homicidal ideation, homicidal behavior and and other safety concerns  Recommended that patient call 911 or go to the local ED should there be a change in any of these risk factors.      Referred To:  none        Elsie Howard LP   10/4/2021

## 2021-11-08 DIAGNOSIS — E66.3 OVERWEIGHT (BMI 25.0-29.9): ICD-10-CM

## 2021-11-10 RX ORDER — NALTREXONE HYDROCHLORIDE AND BUPROPION HYDROCHLORIDE 8; 90 MG/1; MG/1
TABLET, EXTENDED RELEASE ORAL
Qty: 60 TABLET | Refills: 0 | Status: SHIPPED | OUTPATIENT
Start: 2021-11-10 | End: 2021-11-24

## 2021-11-10 NOTE — TELEPHONE ENCOUNTER
Last Clinic Visit: 8/2/2021  [facility] Weight Management Clinic [place]  Future Visit: 11/24/2021

## 2021-11-24 ENCOUNTER — VIRTUAL VISIT (OUTPATIENT)
Dept: ENDOCRINOLOGY | Facility: CLINIC | Age: 61
End: 2021-11-24
Payer: COMMERCIAL

## 2021-11-24 VITALS — WEIGHT: 233 LBS | HEIGHT: 72 IN | BODY MASS INDEX: 31.56 KG/M2

## 2021-11-24 DIAGNOSIS — E66.3 OVERWEIGHT (BMI 25.0-29.9): ICD-10-CM

## 2021-11-24 PROCEDURE — 99213 OFFICE O/P EST LOW 20 MIN: CPT | Mod: 95 | Performed by: PHYSICIAN ASSISTANT

## 2021-11-24 RX ORDER — NALTREXONE HYDROCHLORIDE AND BUPROPION HYDROCHLORIDE 8; 90 MG/1; MG/1
2 TABLET, EXTENDED RELEASE ORAL 2 TIMES DAILY
Qty: 120 TABLET | Refills: 5 | Status: SHIPPED | OUTPATIENT
Start: 2021-11-24 | End: 2022-05-05

## 2021-11-24 ASSESSMENT — MIFFLIN-ST. JEOR: SCORE: 1909.88

## 2021-11-24 NOTE — LETTER
2021       RE: Speedy Carlson  [address] [facility] [address]     Dear Colleague,    Thank you for referring your patient, Speedy Carlson, to the [facility] WEIGHT MANAGEMENT CLINIC [place] at [facility]. Please see a copy of my visit note below.    During this virtual visit the patient is located in [place], patient verifies this as the location during the entirety of this visit.     Issa is a 60 year old who is being evaluated via a billable video visit.      How would you like to obtain your AVS? MyChart  If the video visit is dropped, the invitation should be resent by: Text to cell phone:  [phone]  Will anyone else be joining your video visit? No    Video Start Time: 1:03 PM    Video-Visit Details    Type of service:  Video Visit    Video End Time:1:14 PM    Originating Location (pt. Location): Home    Distant Location (provider location):  [facility] WE[facility]NA[facility] CLIN[facility] [place]     Platform used for Video Visit: Angel Gil NREMT      15 minutes spent on the date of the encounter doing chart review, history and exam, documentation and further activities per the note    Return Medical Weight Management Note     Speedy Carlson  MRN:  [id number]  :  1960  CAMILO:  2021    Dear Physician No Ref-Primary,    I had the pleasure of seeing your patient Speedy Carlson. He is a 60 year old male who I am continuing to see for treatment of obesity related to:       2019   I have the following health issues associated with obesity: Heart Disease, High Blood Pressure, High Cholesterol, Sleep Apnea   I have the following symptoms associated with obesity: None of the above       Assessment & Plan   Problem List Items Addressed This Visit     Overweight (BMI 25.0-29.9)    Relevant Medications    naltrexone-bupropion (CONTRAVE) 8-90 MG per 12 hr tablet           INTERVAL HISTORY:  SANCHE[facility] eason  up  Weight stable since last visit  He has lost from 252 to 233 lbs (7.65% TBW)    Making eating changes with eating pasta and pizza much less    AOM:  Saxenda/ozempic: GI upset, abdominal cramping  Topiramate: anxiety, shaky  Phentermine: not appropriate due to family cardiac history     PLAN:  Refill contrave, can take second dose with supper or later if needed    Follow up 6 months return SANCHEZ Childers PA-C        CURRENT WEIGHT:   233 lbs 0 oz    Initial Weight (lbs): 252.3 lbs  Last Visits Weight: 105.2 kg (232 lb)  Cumulative weight loss (lbs): 19.3  Weight Loss Percentage: 7.65%    Changes and Difficulties 11/22/2021   I have made the following changes to my diet since my last visit: Still avoiding pizza and pasta   With regards to my diet, I am still struggling with: sugar   I have made the following changes to my activity/exercise since my last visit: Exercise has maintained at 3 strong walks per week   With regards to my activity/exercise, I am still struggling with: finding time         MEDICATIONS:   Current Outpatient Medications   Medication Sig Dispense Refill     naltrexone-bupropion (CONTRAVE) 8-90 MG per 12 hr tablet Take 2 tablets by mouth 2 times daily 120 tablet 5     albuterol (PROAIR HFA/PROVENTIL HFA/VENTOLIN HFA) 108 (90 Base) MCG/ACT inhaler Inhale 1-2 puffs into the lungs       aspirin 81 MG tablet Take 81 mg by mouth daily       fenofibrate (TRICOR) 145 MG tablet Take 145 mg by mouth       losartan (COZAAR) 50 MG tablet Take 1 tablet (50 mg) by mouth daily  0     pravastatin (PRAVACHOL) 40 MG tablet Take 40 mg by mouth daily        tamsulosin (FLOMAX) 0.4 MG capsule Take 1 capsule (0.4 mg) by mouth daily 20 capsule 0     traZODone (DESYREL) 50 MG tablet Take 150 mg by mouth At Bedtime        zolpidem (AMBIEN) 5 MG tablet Take 5 mg by mouth nightly as needed for sleep          Weight Loss Medication History Reviewed With Patient 11/22/2021   Which weight loss medications are you  currently taking on a regular basis?  Contrave (naltrexone/bupropion)   Are you having any side effects from the weight loss medication that we have prescribed you? No   If you are having side effects please describe: -       Lab on 08/18/2021   Component Date Value Ref Range Status     Sodium 08/18/2021 136  133 - 144 mmol/L Final     Potassium 08/18/2021 5.0  3.4 - 5.3 mmol/L Final     Chloride 08/18/2021 105  94 - 109 mmol/L Final     Carbon Dioxide (CO2) 08/18/2021 30  20 - 32 mmol/L Final     Anion Gap 08/18/2021 1* 3 - 14 mmol/L Final     Urea Nitrogen 08/18/2021 19  7 - 30 mg/dL Final     Creatinine 08/18/2021 1.21  0.66 - 1.25 mg/dL Final     Calcium 08/18/2021 9.6  8.5 - 10.1 mg/dL Final     Glucose 08/18/2021 82  70 - 99 mg/dL Final     Alkaline Phosphatase 08/18/2021 50  40 - 150 U/L Final     AST 08/18/2021 29  0 - 45 U/L Final     ALT 08/18/2021 43  0 - 70 U/L Final     Protein Total 08/18/2021 7.3  6.8 - 8.8 g/dL Final     Albumin 08/18/2021 4.3  3.4 - 5.0 g/dL Final     Bilirubin Total 08/18/2021 0.9  0.2 - 1.3 mg/dL Final     GFR Estimate 08/18/2021 65  >60 mL/min/1.73m2 Final    As of July 11, 2021, eGFR is calculated by the CKD-EPI creatinine equation, without race adjustment. eGFR can be influenced by muscle mass, exercise, and diet. The reported eGFR is an estimation only and is only applicable if the renal function is stable.       PHYSICAL EXAM:  Objective    Ht 1.829 m (6')   Wt 105.7 kg (233 lb)   BMI 31.60 kg/m             Vitals:  No vitals were obtained today due to virtual visit.    GENERAL: Healthy, alert and no distress  EYES: Eyes grossly normal to inspection.  No discharge or erythema, or obvious scleral/conjunctival abnormalities.  RESP: No audible wheeze, cough, or visible cyanosis.  No visible retractions or increased work of breathing.    SKIN: Visible skin clear. No significant rash, abnormal pigmentation or lesions.  NEURO: Cranial nerves grossly intact.  Mentation and speech  appropriate for age.  PSYCH: Mentation appears normal, affect normal/bright, judgement and insight intact, normal speech and appearance well-groomed.      Sincerely,    Mary Childers PA-C

## 2021-11-24 NOTE — PROGRESS NOTES
During this virtual visit the patient is located in [place], patient verifies this as the location during the entirety of this visit.     Issa is a 60 year old who is being evaluated via a billable video visit.      How would you like to obtain your AVS? [employer]  If the video visit is dropped, the invitation should be resent by: Text to cell phone:  [phone]  Will anyone else be joining your video visit? No    Video Start Time: 1:03 PM    Video-Visit Details    Type of service:  Video Visit    Video End Time:1:14 PM    Originating Location (pt. Location): Home    Distant Location (provider location):  [facility] WEIGHT MANAGEMENT CLINIC [place]     Platform used for Video Visit: Angel Gil NREMT      15 minutes spent on the date of the encounter doing chart review, history and exam, documentation and further activities per the note    Return Medical Weight Management Note     Speedy Carlson  MRN:  [id number]  :  1960  CAMILO:  2021    Dear Physician No Ref-Primary,    I had the pleasure of seeing your patient Speedy Carlson. He is a 60 year old male who I am continuing to see for treatment of obesity related to:       2019   I have the following health issues associated with obesity: Heart Disease, High Blood Pressure, High Cholesterol, Sleep Apnea   I have the following symptoms associated with obesity: None of the above       Assessment & Plan   Problem List Items Addressed This Visit     Overweight (BMI 25.0-29.9)    Relevant Medications    naltrexone-bupropion (CONTRAVE) 8-90 MG per 12 hr tablet           INTERVAL HISTORY:  [facility] follow up  Weight stable since last visit  He has lost from 252 to 233 lbs (7.65% TBW)    Making eating changes with eating pasta and pizza much less    AOM:  Saxenda/ozempic: GI upset, abdominal cramping  Topiramate: anxiety, shaky  Phentermine: not appropriate due to family cardiac history     PLAN:  Refill contrave, can take second dose  with supper or later if needed    Follow up 6 months return SANCHEZ Childers PA-C        CURRENT WEIGHT:   233 lbs 0 oz    Initial Weight (lbs): 252.3 lbs  Last Visits Weight: 105.2 kg (232 lb)  Cumulative weight loss (lbs): 19.3  Weight Loss Percentage: 7.65%    Changes and Difficulties 11/22/2021   I have made the following changes to my diet since my last visit: Still avoiding pizza and pasta   With regards to my diet, I am still struggling with: sugar   I have made the following changes to my activity/exercise since my last visit: Exercise has maintained at 3 strong walks per week   With regards to my activity/exercise, I am still struggling with: finding time         MEDICATIONS:   Current Outpatient Medications   Medication Sig Dispense Refill     naltrexone-bupropion (CONTRAVE) 8-90 MG per 12 hr tablet Take 2 tablets by mouth 2 times daily 120 tablet 5     albuterol (PROAIR HFA/PROVENTIL HFA/VENTOLIN HFA) 108 (90 Base) MCG/ACT inhaler Inhale 1-2 puffs into the lungs       aspirin 81 MG tablet Take 81 mg by mouth daily       fenofibrate (TRICOR) 145 MG tablet Take 145 mg by mouth       losartan (COZAAR) 50 MG tablet Take 1 tablet (50 mg) by mouth daily  0     pravastatin (PRAVACHOL) 40 MG tablet Take 40 mg by mouth daily        tamsulosin (FLOMAX) 0.4 MG capsule Take 1 capsule (0.4 mg) by mouth daily 20 capsule 0     traZODone (DESYREL) 50 MG tablet Take 150 mg by mouth At Bedtime        zolpidem (AMBIEN) 5 MG tablet Take 5 mg by mouth nightly as needed for sleep          Weight Loss Medication History Reviewed With Patient 11/22/2021   Which weight loss medications are you currently taking on a regular basis?  Contrave (naltrexone/bupropion)   Are you having any side effects from the weight loss medication that we have prescribed you? No   If you are having side effects please describe: -       Lab on 08/18/2021   Component Date Value Ref Range Status     Sodium 08/18/2021 136  133 - 144 mmol/L Final      Potassium 08/18/2021 5.0  3.4 - 5.3 mmol/L Final     Chloride 08/18/2021 105  94 - 109 mmol/L Final     Carbon Dioxide (CO2) 08/18/2021 30  20 - 32 mmol/L Final     Anion Gap 08/18/2021 1* 3 - 14 mmol/L Final     Urea Nitrogen 08/18/2021 19  7 - 30 mg/dL Final     Creatinine 08/18/2021 1.21  0.66 - 1.25 mg/dL Final     Calcium 08/18/2021 9.6  8.5 - 10.1 mg/dL Final     Glucose 08/18/2021 82  70 - 99 mg/dL Final     Alkaline Phosphatase 08/18/2021 50  40 - 150 U/L Final     AST 08/18/2021 29  0 - 45 U/L Final     ALT 08/18/2021 43  0 - 70 U/L Final     Protein Total 08/18/2021 7.3  6.8 - 8.8 g/dL Final     Albumin 08/18/2021 4.3  3.4 - 5.0 g/dL Final     Bilirubin Total 08/18/2021 0.9  0.2 - 1.3 mg/dL Final     GFR Estimate 08/18/2021 65  >60 mL/min/1.73m2 Final    As of July 11, 2021, eGFR is calculated by the CKD-EPI creatinine equation, without race adjustment. eGFR can be influenced by muscle mass, exercise, and diet. The reported eGFR is an estimation only and is only applicable if the renal function is stable.       PHYSICAL EXAM:  Objective    Ht 1.829 m (6')   Wt 105.7 kg (233 lb)   BMI 31.60 kg/m             Vitals:  No vitals were obtained today due to virtual visit.    GENERAL: Healthy, alert and no distress  EYES: Eyes grossly normal to inspection.  No discharge or erythema, or obvious scleral/conjunctival abnormalities.  RESP: No audible wheeze, cough, or visible cyanosis.  No visible retractions or increased work of breathing.    SKIN: Visible skin clear. No significant rash, abnormal pigmentation or lesions.  NEURO: Cranial nerves grossly intact.  Mentation and speech appropriate for age.  PSYCH: Mentation appears normal, affect normal/bright, judgement and insight intact, normal speech and appearance well-groomed.        Sincerely,    Mary Childers PA-C

## 2021-11-24 NOTE — PATIENT INSTRUCTIONS
PLAN:  Refill contrave, can take second dose with supper or later if needed    Follow up 6 months return SANCHEZ Childers PA-C

## 2021-11-24 NOTE — NURSING NOTE
Chief Complaint   Patient presents with     Follow Up     Return medical weight management.       Vitals:    11/24/21 1237   Weight: 233 lb       Body mass index is 31.6 kg/m .      Gus Gil, EMT  [facility]

## 2022-02-19 ENCOUNTER — HEALTH MAINTENANCE LETTER (OUTPATIENT)
Age: 62
End: 2022-02-19

## 2022-03-28 ENCOUNTER — TRANSCRIBE ORDERS (OUTPATIENT)
Dept: OTHER | Age: 62
End: 2022-03-28

## 2022-03-28 DIAGNOSIS — E55.9 VITAMIN D DEFICIENCY, UNSPECIFIED: ICD-10-CM

## 2022-03-28 DIAGNOSIS — I10 ESSENTIAL (PRIMARY) HYPERTENSION: ICD-10-CM

## 2022-03-28 DIAGNOSIS — N52.1 ERECTILE DYSFUNCTION DUE TO DISEASES CLASSIFIED ELSEWHERE: ICD-10-CM

## 2022-03-28 DIAGNOSIS — F51.04 PSYCHOPHYSIOLOGIC INSOMNIA: ICD-10-CM

## 2022-03-28 DIAGNOSIS — G47.33 OBSTRUCTIVE SLEEP APNEA (ADULT) (PEDIATRIC): ICD-10-CM

## 2022-03-28 DIAGNOSIS — Z20.822 CONTACT WITH AND (SUSPECTED) EXPOSURE TO COVID-19: ICD-10-CM

## 2022-03-28 DIAGNOSIS — Z87.438 PERSONAL HISTORY OF OTHER DISEASES OF MALE GENITAL ORGANS: ICD-10-CM

## 2022-03-28 DIAGNOSIS — Z82.49 FAMILY HISTORY OF ISCHEMIC HEART DISEASE AND OTHER DISEASES OF THE CIRCULATORY SYSTEM: ICD-10-CM

## 2022-03-28 DIAGNOSIS — E66.09 OTHER OBESITY DUE TO EXCESS CALORIES: ICD-10-CM

## 2022-03-28 DIAGNOSIS — Z99.89 DEPENDENCE ON OTHER ENABLING MACHINES AND DEVICES: ICD-10-CM

## 2022-03-28 DIAGNOSIS — I10 ESSENTIAL HYPERTENSION: ICD-10-CM

## 2022-03-28 DIAGNOSIS — E78.2 MIXED HYPERLIPIDEMIA: ICD-10-CM

## 2022-03-28 DIAGNOSIS — E66.09 OTHER OBESITY DUE TO EXCESS CALORIES: Primary | ICD-10-CM

## 2022-03-28 DIAGNOSIS — Z82.49 FAMILY HISTORY OF ISCHEMIC HEART DISEASE: ICD-10-CM

## 2022-03-30 ENCOUNTER — TELEPHONE (OUTPATIENT)
Dept: GASTROENTEROLOGY | Facility: CLINIC | Age: 62
End: 2022-03-30
Payer: COMMERCIAL

## 2022-03-30 DIAGNOSIS — Z11.59 ENCOUNTER FOR SCREENING FOR OTHER VIRAL DISEASES: Primary | ICD-10-CM

## 2022-03-30 NOTE — TELEPHONE ENCOUNTER
Screening Questions  BlueKIND OF PREP RedLOCATION [review exclusion criteria] GreenSEDATION TYPE  1. Have you had a positive covid test in the last 90 days? n     2. Are you active on mychart? y    3. What insurance is in the chart? [employer]     3.   Ordering/Referring Provider: Lisandro    4. BMI 32.5 [BMI OVER 40-EXTENDED PREP]  If greater than 40 review exclusion criteria [PAC APPT IF @ [facility]]        5.  Respiratory Screening :  [If yes to any of the following HOSPITAL setting only]     Do you use daily home oxygen? n    Do you have mod to severe Obstructive Sleep Apnea? y  [OKAY @ [facility] SH PH RI]   Do you have Pulmonary Hypertension? n     Do you have UNCONTROLLED asthma? n        6.   Have you had a heart or lung transplant? n      7.   Are you currently on dialysis? n [ If yes, G-PREP & HOSPITAL setting only]     8.   Do you have chronic kidney disease? n [ If yes, G-PREP ]    9.   Have you had a stroke or Transient ischemic attack (TIA - aka  mini stroke ) within 6 months?  n (If yes, please review exclusion criteria)    10.   In the past 6 months, have you had any heart related issues including cardiomyopathy or heart attack? n           If yes, did it require cardiac stenting or other implantable device? n      11.   Do you have any implantable devices in your body (pacemaker, defib, LVAD)? y Inspire device (If yes, please review exclusion criteria)    12.   Do you take nitroglycerin? n           If yes, how often? n  (if yes, HOSPITAL setting ONLY)    13.   Are you currently taking any blood thinners? n           [IF YES, INFORM PATIENT TO FOLLOW UP W/ ORDERING PROVIDER FOR BRIDGING INSTRUCTIONS]     14.   Do you have a diagnosis of diabetes? n   [ If yes, G-PREP ]    15.   [FEMALES] Are you currently pregnant?     If yes, how many weeks?     16.   Are you taking any prescription pain medications on a routine schedule?  n  [ If yes, EXTENDED PREP.] [If yes, MAC]    17.   Do you have any  chemical dependencies such as alcohol, street drugs, or methadone?  n [If yes, MAC]    18.   Do you have any history of post-traumatic stress syndrome, severe anxiety or history of psychosis?  n  [If yes, MAC]    19.   Do you have a significant intellectual disability?  n [If yes, MAC]    20.   Do you transfer independently?  y    21.  On a regular basis do you go 3-5 days between bowel movements? n   [ If yes, EXTENDED PREP.]    22.   Preferred LOCAL Pharmacy for Pre Prescription     [facility] [address] [id number] - [place] [address] [address] AT [facility] [address]      Scheduling Details      Caller : Speedy Carlson  (Please ask for phone number if not scheduled by patient)    Type of Procedure Scheduled: Colonosopy  Which Colonoscopy Prep was Sent?: Mircatarino RAMIREZ CF PATIENTS & GROEN'S PATIENTS NEEDS EXTENDED PREP  Surgeon: Vini  Date of Procedure: 4/8  Location: [facility]      Sedation Type: CS  Conscious Sedation- Needs  for 6 hours after the procedure  MAC/General-Needs  for 24 hours after procedure    Pre-op Required at [facility], [place], [place] and OR for MAC sedation: n  (advise patient they will need a pre-op prior to procedure -)      Informed patient they will need an adult  y  Cannot take any type of public or medical transportation alone    [facility]Pro[facility] or Externally: y    Confirmed Nurse will call to complete assessment y    Additional comments:

## 2022-03-31 ENCOUNTER — PRE VISIT (OUTPATIENT)
Dept: UROLOGY | Facility: CLINIC | Age: 62
End: 2022-03-31
Payer: COMMERCIAL

## 2022-03-31 NOTE — CONFIDENTIAL NOTE
Reason for visit: consult     Relevant information: elevated psa    Records/imaging/labs/orders: in epic    Pt called: n/a    At Rooming: virtual

## 2022-03-31 NOTE — TELEPHONE ENCOUNTER
MEDICAL RECORDS REQUEST   [facility]  Urology Clinic  [address] [place] [address]  PHONE: [phone]  Fax: [phone]        FUTURE VISIT INFORMATION                                                   Speedy Carlson, : 1960 scheduled for future visit at [facility]    APPOINTMENT INFORMATION:    Date: 2022    Provider:  Cinthia Graham PA    Reason for Visit/Diagnosis: elevated PSA      RECORDS REQUESTED FOR VISIT                                                     NOTES  STATUS/DETAILS   OFFICE NOTE from other specialist  yes, 2022 -- Steve Mcmahon MD -- [facility]   MEDICATION LIST  yes   LABS     URINALYSIS (UA)  yes, 2021   PSA (LAB)  yes, 2022, 2022     PRE-VISIT CHECKLIST      Record collection complete Yes   Appointment appropriately scheduled           (right time/right provider) Yes   Joint diagnostic appointment coordinated correctly          (ensure right order & amount of time) Yes   MyChart activation Yes   Questionnaire complete If no, please explain pending

## 2022-04-01 ENCOUNTER — TELEPHONE (OUTPATIENT)
Dept: GASTROENTEROLOGY | Facility: CLINIC | Age: 62
End: 2022-04-01

## 2022-04-01 ENCOUNTER — TELEPHONE (OUTPATIENT)
Dept: UROLOGY | Facility: CLINIC | Age: 62
End: 2022-04-01

## 2022-04-01 NOTE — TELEPHONE ENCOUNTER
Pre assessment questions completed for upcoming colonoscopy procedure scheduled on 4.8.2022    COVID test scheduled 4.5.2022    Reviewed procedural arrival time 1015 and facility location [facility].    Designated  policy reviewed. Instructed to have someone stay 6 hours post procedure.     Pt wants MAC sedation.  Pt stated he spoke with his PCP specifically re: MAC sedation as per pt his last colonoscopy was uncomfortable.  RN is unable to see last colonoscopy report.  Pt transferred to endo scheduling.  Pt is aware he will need a pre-op with [facility] MAC.     Anticoagulation/blood thinners? no    Electronic implanted devices? Inspire device; pt instructed to bring remote with to his procedure    Naltrexone? Yes, Hold x 3 days.  Pt needs to f/u with PCP re: contrave dosing prior to procedure.    Reviewed Miralax/Magnesium citrate/Dulcolax prep instructions with patient. No fiber/iron supplements or foods that contain nuts/seeds prior to procedure.     Patient verbalized understanding and had no questions or concerns at this time.    Cinthia Simon RN

## 2022-04-01 NOTE — TELEPHONE ENCOUNTER
FUTURE VISIT INFORMATION      SURGERY INFORMATION:    Date: 22    Location:  gi    Surgeon:  Echo Caldera MD    Anesthesia Type:  MAC    Procedure: COLONOSCOPY    RECORDS REQUESTED FROM:       Primary Care Provider: Steve Mcmahon MD  - [facility]    Pertinent Medical History: HANNAH, Hypertension, CAD    Most recent EKG+ Tracin21    Most recent Sleep Study:  3/29/21

## 2022-04-01 NOTE — TELEPHONE ENCOUNTER
Caller: Speedy Carlson     Procedure: COLONOSCOPY     Date, Location, and Surgeon of Procedure Cancelled:     Surgeon: Vini  Date of Procedure: 4/8  Location: [facility]    Ordering Provider:MONALISA SPIVEY    Reason for cancel (please be detailed, any staff messages or encounters to note?):     PER PT REQUEST FOR (MAC) SEDATION AND VIRTUAL PAC EVAL.         Rescheduled: YES      If rescheduled:    Date: 04/28/2022   Location: [facility]   Note any change or update to original order/sedation:     (CS) to (MAC)

## 2022-04-01 NOTE — TELEPHONE ENCOUNTER
LVM explaining that pt will need to reschedule appt with Cinthia Graham PA-C on 4/1/2022. Provider has an emergency and needed to cancel clinic.    Zeynep Berrios MA  April 1, 2022

## 2022-04-07 ENCOUNTER — PRE VISIT (OUTPATIENT)
Dept: SURGERY | Facility: CLINIC | Age: 62
End: 2022-04-07
Payer: COMMERCIAL

## 2022-04-07 ENCOUNTER — VIRTUAL VISIT (OUTPATIENT)
Dept: SURGERY | Facility: CLINIC | Age: 62
End: 2022-04-07
Payer: COMMERCIAL

## 2022-04-07 ENCOUNTER — VIRTUAL VISIT (OUTPATIENT)
Dept: UROLOGY | Facility: CLINIC | Age: 62
End: 2022-04-07
Payer: COMMERCIAL

## 2022-04-07 ENCOUNTER — ANESTHESIA EVENT (OUTPATIENT)
Dept: SURGERY | Facility: CLINIC | Age: 62
End: 2022-04-07

## 2022-04-07 DIAGNOSIS — Z01.818 PREOP EXAMINATION: Primary | ICD-10-CM

## 2022-04-07 DIAGNOSIS — R97.20 ELEVATED PROSTATE SPECIFIC ANTIGEN (PSA): Primary | ICD-10-CM

## 2022-04-07 DIAGNOSIS — Z12.11 ENCOUNTER FOR SCREENING COLONOSCOPY: ICD-10-CM

## 2022-04-07 PROCEDURE — 99204 OFFICE O/P NEW MOD 45 MIN: CPT | Mod: 95 | Performed by: PHYSICIAN ASSISTANT

## 2022-04-07 PROCEDURE — 99203 OFFICE O/P NEW LOW 30 MIN: CPT | Mod: 95 | Performed by: PHYSICIAN ASSISTANT

## 2022-04-07 RX ORDER — AMLODIPINE BESYLATE 5 MG/1
5 TABLET ORAL EVERY MORNING
COMMUNITY
Start: 2022-03-11 | End: 2023-03-11

## 2022-04-07 ASSESSMENT — PAIN SCALES - GENERAL: PAINLEVEL: NO PAIN (0)

## 2022-04-07 NOTE — PROGRESS NOTES
Issa is a 61 year old who is being evaluated via a billable video visit.      How would you like to obtain your AVS? MyChart  If the video visit is dropped, the invitation should be resent by: Text to cell phone: [phone]      HPI         Review of Systems         Objective    Vitals - Patient Reported  Pain Score: No Pain (0)        Physical Exam

## 2022-04-07 NOTE — PROGRESS NOTES
Issa is a 61 year old who is being evaluated via a billable video visit.      How would you like to obtain your AVS? MyChart  If the video visit is dropped, the invitation should be resent by: Send to e-mail at: [email or web address]  Will anyone else be joining your video visit? No      Video Start Time: 10:39 AM  Video-Visit Details    Type of service:  Video Visit    Video End Time:10:49 AM    Originating Location (pt. Location): Home    Distant Location (provider location):  [facility] [place]     Platform used for Video Visit: Fieldglass

## 2022-04-07 NOTE — PROGRESS NOTES
Chief Complaint:   Elevated PSA         History of Present Illness:   Speedy Carlson is a 61 year old male with a history of CAD, hypertension, and HANNAH who presents for evaluation of elevated PSA.  Patient with recent screening PSA 6.2ng/mL from 3/29/2022, with increased from PSA 4.7ng/mL from 1/4/2022.  His other most recent PSA values included 4.25ng/mL from 10/22/2020 and PSA 3.49ng/mL from 1/7/2019.  Patient does have a history of intermittent prostatitis in the past, and has been treated for this in the past but has not been treated within the past several years.  Patient denies any baseline urinary symptoms including no frequency, urgency, slowed urinary stream, or incomplete bladder emptying.  No dysuria or hematuria.  No recent bicycle or motorcycle riding.  No known family history of prostate cancer.           Past Medical History:     Past Medical History:   Diagnosis Date     Acute prostatitis 12/2004     Calculus of kidney 1994    no recurrence     Cervical radiculopathy 10/04/2016    R side     Class 1 obesity due to excess calories without serious comorbidity with body mass index (BMI) of 31.0 to 31.9 in adult 07/06/2020     Coronary artery disease due to lipid rich plaque     coronary arteryt calcium score of 22, 63rd percentile in 2016     Dry eye syndrome      Hypertension      Idiopathic urticaria      Impotence of organic origin      Insomnia      Mild intermittent asthma 1975    hx in childhood     Mixed hyperlipidemia      Obesity      HANNAH (obstructive sleep apnea)      Other acne      Prostatitis      Seborrhea capitis      Tinnitus      Varicella without mention of complication             Past Surgical History:     Past Surgical History:   Procedure Laterality Date     COLONOSCOPY  2012     DENTAL SURGERY  1980    wisdom teeth     ENDOSCOPY DRUG INDUCED SLEEP N/A 6/3/2020    Procedure: DRUG-INDUCED SLEEP ENDOSCOPY;  Surgeon: Gladys Marroquin MD;  [facility]     IMPLANT  GENERATOR STIMULATOR (LOCATION) Right 10/28/2020    Procedure: INSERTION, PULSE GENERATOR, NEUROSTIMULATOR;  Surgeon: Gladys Marroquin MD;  Location: [facility] OR     PHOTOREFRACTIVE KERATECTOMY  2000    s/p Lasik surgery     TONSILLECTOMY  1971    s/p Tonsillectomy            Medications     Current Outpatient Medications   Medication     albuterol (PROAIR HFA/PROVENTIL HFA/VENTOLIN HFA) 108 (90 Base) MCG/ACT inhaler     amLODIPine (NORVASC) 5 MG tablet     naltrexone-bupropion (CONTRAVE) 8-90 MG per 12 hr tablet     pravastatin (PRAVACHOL) 40 MG tablet     traZODone (DESYREL) 50 MG tablet     zolpidem (AMBIEN) 5 MG tablet     aspirin 81 MG tablet     fenofibrate (TRICOR) 145 MG tablet     losartan (COZAAR) 50 MG tablet     tamsulosin (FLOMAX) 0.4 MG capsule     No current facility-administered medications for this visit.            Family History:     Family History   Problem Relation Age of Onset     Cancer Father         lung, smoker     Breast Cancer Mother         at age  45     Depression Mother      Obesity Mother      Eye Disorder Mother         cataracts     Hyperlipidemia Mother      Hypertension Mother      Coronary Artery Disease Mother         First MI betweeen 60 and 65 year of age, 3 MIs in past 15 years     Pulmonary Embolism Mother         on Eliquis     Coronary Artery Disease Brother         MI at age 30, pericarditis then MI     Coronary Artery Disease Brother         CABG x 4 at age 48, smoker     Hyperlipidemia Brother      Obesity Brother         Has lost over 100 pounds            Social History:     Social History     Socioeconomic History     Marital status:      Spouse name: Acosta Zuleta     Number of children: 0     Years of education: 18     Highest education level: Not on file   Occupational History     Occupation: Research Analyst     Employer: [employer]     Occupation: Consultant     Comment: self-employed   Tobacco Use     Smoking status: Never Smoker      Smokeless tobacco: Never Used     Tobacco comment: cigar occ   Substance and Sexual Activity     Alcohol use: Yes     Comment: 2-4 beers per week     Drug use: No     Sexual activity: Yes     Partners: Female     Birth control/protection: Pill   Other Topics Concern     Not on file   Social History Narrative    Social Documentation:        Balanced Diet: YES    Calcium intake: 1-2 per day    Caffeine: 1 cups coffee per day-during week    Exercise:  type of activity walk;  3 days / week  exercycle, treadmill    Sunscreen: Yes    Seatbelts:  Yes    Self Testicular Exam: Yes    Physical/Emotional/Sexual Abuse: No     Do you feel safe in your environment? Yes        Cholesterol screen up to date: No-Discussed    Eye Exam up to date: Yes    Dental Exam up to date: Yes    Dexa Scan up to date: Does Not Apply    Colonoscopy up to date: Does Not Apply    Immunizations up to date: Yes     Glucose screen if over 40:  No- Discussed        Brittany Canada MA     Social Determinants of Health     Financial Resource Strain: Not on file   Food Insecurity: Not on file   Transportation Needs: Not on file   Physical Activity: Not on file   Stress: Not on file   Social Connections: Not on file   Intimate Partner Violence: Not on file   Housing Stability: Not on file            Allergies:   Liraglutide, Vitamin d3 [cholecalciferol], Hmg-coa-r inhibitors, and Azithromycin         Review of Systems:  From intake questionnaire   Negative 14 system review except as noted on HPI, nurse's note.         Physical Exam:   Patient is a 61 year old  male    General Appearance Adult: Alert, no acute distress, oriented  Lungs: no respiratory distress, or pursed lip breathing  Heart: No obvious jugular venous distension present  Skin: no suspicious lesions or rashes  Neuro: Alert, oriented, speech and mentation normal  Further examination is deferred due to the nature of our visit.        Labs and Pathology:    I personally reviewed all applicable  laboratory data and went over findings with patient  Significant for:    CBC RESULTS:  Recent Labs   Lab Test 05/22/21  1129 08/12/14  1445   WBC 5.2 7.3   HGB 13.4 14.6    370        BMP RESULTS:  Recent Labs   Lab Test 08/18/21  1520 08/12/14  1445    137   POTASSIUM 5.0 4.2   CHLORIDE 105 104   CO2 30 26   ANIONGAP 1* 7   GLC 82 97   BUN 19 14   CR 1.21 1.17   GFRESTIMATED 65 65   GFRESTBLACK  --  79   RAVI 9.6 9.1       UA RESULTS:   Recent Labs   Lab Test 05/22/21  1125   SG >1.030   URINEPH 5.5   NITRITE Negative       PSA RESULTS  PSA   Date Value Ref Range Status   04/28/2005 2.05 0 - 4 ug/L Final     PSA  2.75 in 2011  7.41 in 2012  3.92 in 2013  3.44 in 2014  3.2 in 2015  3.4 in 2016  3.49 in 2019  4.25 in 2020    Prostatic Specific Antigen Screen (1/4/2022)  Specimen:  Blood   Ref Range & Units 3 mo ago   Prostatic Specific Antigen 0.0 - 4.0 ng/mL 4.7 High       Prostatic Specific Antigen Screen (3/29/2022)  Specimen:  Blood   Ref Range & Units 9 d ago   Prostatic Specific Antigen 0.0 - 4.0 ng/mL 6.2 High             Imaging:    I personally reviewed all applicable imaging and went over findings with patient.  Significant for: no recent relevant imaging          Assessment and Plan:     Assessment: 61 year old male with elevated PSA 6.2ng/mL from 3/29/2022, increased from PSA 4.7ng/mL from 1/4/2022.  His other most recent PSA values included 4.25ng/mL from 10/22/2020 and PSA 3.49ng/mL from 1/7/2019.  Patient without any baseline urinary symptoms to suggest BPH or infection.  Patient without a family history of prostate cancer.  At this time, we will plan to proceed with a prostate MRI and schedule for a prostate biopsy to follow; need to proceed with prostate biopsy pending results of MRI.     Plan:  - Schedule prostate MRI next available.   - Schedule MRI fusion prostate biopsy next available for a date following the prostate MRI.       MARI Mena  Department of Urology

## 2022-04-07 NOTE — PATIENT INSTRUCTIONS
Name:  Speedy Carlson   MRN:  [id number]   :  1960   Today's Date:  2022     GI Lab procedures:    A representative from the GI Lab will contact you regarding arrival date and time      You were seen today in the PAC Clinic   (Pre operative Anesthesia Assessment Center)  [address]  [address]   phone [phone]    You had a pre operative assessment done with Fouzia Glass YENIFER:    Anesthesia recommendations for medications:    Hold aspirin for 7 days before procedure  Hold multivitamins for 7 days before procedure   Hold herbal medications and supplements for 7 days before procedure  Hold ibuprofen for at least 24 hours before procedure, unless instructed differently by GI.   Hold Naproxen for at least 4 days before procedure     Hold Naltrexone-Bupropion (Contrave) for 3 days prior to procedure. Take the last Contrave on 22 and then hold until procedure. (Per Fouzia Glass YENIFER)    Please take these medications the day of procedure:  Amlodipine (Norvasc), Pravastatin (Pravachol),   Albuterol inhaler if needed  Acetaminophen (Tylenol) if needed    Bring Albuterol inhaler the day of the procedure.    For questions or appointments, call:  [facility]: [phone], option 2  Monday through Friday, 8 a.m. to 4:30 p.m.  (If it is after hours please reach out to the clinic or provider you were scheduled with.)

## 2022-04-07 NOTE — OR NURSING
Was asked by Fouzia Glass YENIFER to call Issa Carlson and go over medication instructions for upcoming Colonoscopy procedure. Gave patient verbal medication instructions and copy of instructions sent via My Chart. Will hold Contrave x3 days prior to procedure. Will bring Albuterol inhaler the day of surgery. Issa has no questions at this time.

## 2022-04-07 NOTE — H&P
Pre-Operative H & P     CC:  Preoperative exam to assess for increased cardiopulmonary risk while undergoing surgery and anesthesia.    Date of Encounter: 4/7/2022  Primary Care Physician:  No Ref-Primary, Physician     Reason for visit:   Encounter Diagnoses   Name Primary?     Preop examination Yes     Encounter for screening colonoscopy        HPI  Speedy Carlson is a 61 year old male who presents for pre-operative H & P in preparation for  Procedure Information     Date/Time: 4/28/2022     Procedure: colonoscopy    Anesthesia type: MAC    Pre-op diagnosis: screening colonoscopy    Location: [facility]    Providers: Dr. Caldera          Mr. Carlson is a pleasant 61-year-old male with past medical history significant for hypertension, dyslipidemia, mild intermittent asthma, HANNAH with inspire device in place, obesity, and difficulty sleeping.  Patient is scheduled for the above procedure under MAC.  He has a history of a colonoscopy without sedation which was uncomfortable for him.    History is obtained from the patient and chart review    Hx of abnormal bleeding or anti-platelet use: denies      Past Medical History  Past Medical History:   Diagnosis Date     Acute prostatitis 12/2004     Calculus of kidney 1994    no recurrence     Cervical radiculopathy 10/04/2016    R side     Class 1 obesity due to excess calories without serious comorbidity with body mass index (BMI) of 31.0 to 31.9 in adult 07/06/2020     Coronary artery disease due to lipid rich plaque     coronary arteryt calcium score of 22, 63rd percentile in 2016     Dry eye syndrome      Hypertension      Idiopathic urticaria      Impotence of organic origin      Insomnia      Mild intermittent asthma 1975    hx in childhood     Mixed hyperlipidemia      Obesity      HANNAH (obstructive sleep apnea)      Other acne      Prostatitis      Seborrhea capitis      Tinnitus      Varicella without mention of  complication        Past Surgical History  Past Surgical History:   Procedure Laterality Date     COLONOSCOPY  2012     DENTAL SURGERY  1980    wisdom teeth     ENDOSCOPY DRUG INDUCED SLEEP N/A 6/3/2020    Procedure: DRUG-INDUCED SLEEP ENDOSCOPY;  Surgeon: Gladys Marroquin MD;  [facility]     IMPLANT GENERATOR STIMULATOR (LOCATION) Right 10/28/2020    Procedure: INSERTION, PULSE GENERATOR, NEUROSTIMULATOR;  Surgeon: Gladys Marroquin MD;  Location: [facility] OR     PHOTOREFRACTIVE KERATECTOMY  2000    s/p Lasik surgery     TONSILLECTOMY  1971    s/p Tonsillectomy       Prior to Admission Medications  Current Outpatient Medications   Medication Sig Dispense Refill     albuterol (PROAIR HFA/PROVENTIL HFA/VENTOLIN HFA) 108 (90 Base) MCG/ACT inhaler Inhale 1-2 puffs into the lungs every 4 hours as needed        amLODIPine (NORVASC) 5 MG tablet Take 5 mg by mouth every morning        naltrexone-bupropion (CONTRAVE) 8-90 MG per 12 hr tablet Take 2 tablets by mouth 2 times daily 120 tablet 5     pravastatin (PRAVACHOL) 40 MG tablet Take 40 mg by mouth every morning        traZODone (DESYREL) 50 MG tablet Take 150 mg by mouth At Bedtime        zolpidem (AMBIEN) 5 MG tablet Take 5 mg by mouth nightly as needed for sleep          Allergies  Allergies   Allergen Reactions     Liraglutide GI Disturbance     Saxenda      Vitamin D3 [Cholecalciferol] Rash     Hmg-Coa-R Inhibitors      Myalgias      Statins       Azithromycin Rash     Skin peeling       Social History  Social History     Socioeconomic History     Marital status:      Spouse name: Acosta Zuleta     Number of children: 0     Years of education: 18     Highest education level: Not on file   Occupational History     Occupation: Research Analyst     Employer: [employer]     Occupation: Consultant     Comment: self-employed   Tobacco Use     Smoking status: Never Smoker     Smokeless tobacco: Never Used     Tobacco comment: cigar occ    Substance and Sexual Activity     Alcohol use: Yes     Comment: 2-4 beers per week     Drug use: No     Sexual activity: Yes     Partners: Female     Birth control/protection: Pill   Other Topics Concern     Not on file   Social History Narrative    Social Documentation:        Balanced Diet: YES    Calcium intake: 1-2 per day    Caffeine: 1 cups coffee per day-during week    Exercise:  type of activity walk;  3 days / week  exercycle, treadmill    Sunscreen: Yes    Seatbelts:  Yes    Self Testicular Exam: Yes    Physical/Emotional/Sexual Abuse: No     Do you feel safe in your environment? Yes        Cholesterol screen up to date: No-Discussed    Eye Exam up to date: Yes    Dental Exam up to date: Yes    Dexa Scan up to date: Does Not Apply    Colonoscopy up to date: Does Not Apply    Immunizations up to date: Yes     Glucose screen if over 40:  No- Discussed        Brittany Canada MA     Social Determinants of Health     Financial Resource Strain: Not on file   Food Insecurity: Not on file   Transportation Needs: Not on file   Physical Activity: Not on file   Stress: Not on file   Social Connections: Not on file   Intimate Partner Violence: Not on file   Housing Stability: Not on file       Family History  Family History   Problem Relation Age of Onset     Cancer Father         lung, smoker     Breast Cancer Mother         at age  45     Depression Mother      Obesity Mother      Eye Disorder Mother         cataracts     Hyperlipidemia Mother      Hypertension Mother      Coronary Artery Disease Mother         First MI betweeen 60 and 65 year of age, 3 MIs in past 15 years     Pulmonary Embolism Mother         on Eliquis     Coronary Artery Disease Brother         MI at age 30, pericarditis then MI     Coronary Artery Disease Brother         CABG x 4 at age 48, smoker     Hyperlipidemia Brother      Obesity Brother         Has lost over 100 pounds       Review of Systems  The complete review of systems is  negative other than noted in the HPI or here.     Anesthesia Evaluation   Pt has had prior anesthetic.     No history of anesthetic complications       ROS/MED HX  ENT/Pulmonary: Comment: Inspire Device in place for HANNAH    (+) sleep apnea, Intermittent, asthma Treatment: Inhaler prn,      Neurologic:  - neg neurologic ROS     Cardiovascular:     (+) Dyslipidemia hypertension-----    METS/Exercise Tolerance: >4 METS    Hematologic:       Musculoskeletal:  - neg musculoskeletal ROS     GI/Hepatic:  - neg GI/hepatic ROS     Renal/Genitourinary:     (+) Nephrolithiasis ,     Endo:     (+) Obesity,     Psychiatric/Substance Use:  - neg psychiatric ROS     Infectious Disease:  - neg infectious disease ROS     Malignancy:  - neg malignancy ROS     Other:  - neg other ROS          Virtual visit -  No vitals were obtained    Physical Exam  Constitutional: Awake, alert, cooperative, no apparent distress, and appears stated age.  HENT: Normocephalic  Respiratory: non labored breathing   Neurologic: Awake, alert, oriented to name, place and time.   Neuropsychiatric: Calm, cooperative. Normal affect.      Prior Labs/Diagnostic Studies   All labs and imaging personally reviewed     3/29/22  Sodium 136 - 145 mmol/L 138  [facility]   Potassium 3.5 - 5.1 mmol/L 4.3  [facility]   Chloride 98 - 109 mmol/L 102  [facility]   CO2 20 - 29 mmol/L 26  [facility]   Anion Gap 7 - 16 mmol/L 10  [facility]   Calcium 8.4 - 10.4 mg/dL 9.6  [facility]   BUN 7 - 26 mg/dL 18  [facility]   Creatinine 0.73 - 1.18 mg/dL 0.98  [facility]   GFR, Estimated >60 mL/min/1.73m2 >60  [facility]   Glucose 70 - 100 mg/dL 113 High   [facility]       EKG/ stress test - if available please see in ROS above         The patient's records and results personally reviewed by this provider.     Outside records reviewed from: Care Everywhere      Assessment      Speedy BOLDEN  Aldo is a 61 year old male seen as a PAC referral for risk assessment and optimization for anesthesia.    Plan/Recommendations  Pt will be optimized for the proposed procedure.  See below for details on the assessment, risk, and preoperative recommendations    NEUROLOGY  - No history of TIA, CVA or seizure  - Post Op delirium risk factors:  No risk identified    ENT  - unable to assess  Mallampati: Unable to assess  TM: Unable to assess    CARDIAC  - denies CAD, chest pain, SOB, TEIXEIRA, palpitations  - is active with walking, biking etc.  Can ascend a flight or two of stairs without exertional symptoms  -  HTN, continue amlodipine, HLD continue pravastatin  - METS (Metabolic Equivalents)  Patient performs 4 or more METS exercise without symptoms            Total Score: 0      RCRI-Very low risk: Class 1 0.4% complication rate            Total Score: 0        PULMONARY  - known HANNAH, has Inspire Device in place and will bring remote to procedure    HANNAH Medium Risk            Total Score: 4    HANNAH: Observed stopped breathing    HANNAH: Hypertension    HANNAH: Over 50 ys old    HANNAH: Male      - Asthma  Well controlled   - he states since swiching from ARB to amlodipine for HTN his cough is better    - Tobacco History      History   Smoking Status     Never Smoker   Smokeless Tobacco     Never Used     Comment: cigar occ       GI    PONV Low Risk  Total Score: 1           1 AN PONV: Patient is not a current smoker          ENDOCRINE    - BMI: Estimated body mass index is 31.6 kg/m  as calculated from the following:    Height as of 11/24/21: 1.829 m (6').    Weight as of 11/24/21: 105.7 kg (233 lb).  Obesity (BMI >30)  - No history of Diabetes Mellitus    HEME  VTE Medium Risk 1.8%            Total Score: 7    VTE: Greater than 59 yrs old    VTE: Male    VTE: Family Hx of VTE      - No history of abnormal bleeding or antiplatelet use.            The patient is optimized for their procedure. AVS with information on surgery  time/arrival time, meds and NPO status given by nursing staff. No further diagnostic testing indicated.    Please refer to the physical examination documented by the anesthesiologist in the anesthesia record on the day of surgery.    Video-Visit Details    Type of service:  Video Visit    Patient verbally consented to video service today: YES    Video Start Time: 1217  Video End Time (time video stopped): 1224    Originating Location (pt. Location): Home    Distant Location (provider location):  home    Mode of Communication:  Video Conference via [employer]  On the day of service:     Prep time: 10 minutes  Visit time: 7 minutes  Documentation time: 11 minutes  ------------------------------------------  Total time: 28 minutes      Fouzia Burnett PA-C  Preoperative Assessment Center  [facility]  Phone: [phone]  Fax: [phone]

## 2022-04-07 NOTE — PATIENT INSTRUCTIONS
UROLOGY CLINIC VISIT PATIENT INSTRUCTIONS    - Schedule prostate MRI next available.   - Schedule MRI fusion prostate biopsy next available for a date following the prostate MRI.     If you have any issues, questions or concerns in the meantime, do not hesitate to contact us at [phone] or via [email or web address].     It was a pleasure meeting with you today.  Thank you for allowing me and my team the privilege of caring for you today.  YOU are the reason we are here, and I truly hope we provided you with the excellent service you deserve.  Please let us know if there is anything else we can do for you so that we can be sure you are leaving completely satisfied with your care experience.    Cinthia Graham PA-C  Department of Urology

## 2022-04-07 NOTE — LETTER
4/7/2022       RE: Speedy Carlson  [address] [facility] [place] [facility] [address]     Dear Colleague,    Thank you for referring your patient, Speedy Carlson, to the [facility] at [facility]. Please see a copy of my visit note below.    sIsa is a 61 year old who is being evaluated via a billable video visit.      How would you like to obtain your AVS? MyChart  If the video visit is dropped, the invitation should be resent by: Send to e-mail at: [email or web address]  Will anyone else be joining your video visit? No      Video Start Time: 10:39 AM  Video-Visit Details    Type of service:  Video Visit    Video End Time:10:49 AM    Originating Location (pt. Location): Home    Distant Location (provider location):  [facility]     Platform used for Video Visit: [employer]          Chief Complaint:   Elevated PSA         History of Present Illness:   Speedy Carlson is a 61 year old male with a history of CAD, hypertension, and HANNAH who presents for evaluation of elevated PSA.  Patient with recent screening PSA 6.2ng/mL from 3/29/2022, with increased from PSA 4.7ng/mL from 1/4/2022.  His other most recent PSA values included 4.25ng/mL from 10/22/2020 and PSA 3.49ng/mL from 1/7/2019.  Patient does have a history of intermittent prostatitis in the past, and has been treated for this in the past but has not been treated within the past several years.  Patient denies any baseline urinary symptoms including no frequency, urgency, slowed urinary stream, or incomplete bladder emptying.  No dysuria or hematuria.  No recent bicycle or motorcycle riding.  No known family history of prostate cancer.           Past Medical History:     Past Medical History:   Diagnosis Date     Acute prostatitis 12/2004     Calculus of kidney 1994    no recurrence     Cervical radiculopathy 10/04/2016    R side     Class 1 obesity due  to excess calories without serious comorbidity with body mass index (BMI) of 31.0 to 31.9 in adult 07/06/2020     Coronary artery disease due to lipid rich plaque     coronary arteryt calcium score of 22, 63rd percentile in 2016     Dry eye syndrome      Hypertension      Idiopathic urticaria      Impotence of organic origin      Insomnia      Mild intermittent asthma 1975    hx in childhood     Mixed hyperlipidemia      Obesity      HANNAH (obstructive sleep apnea)      Other acne      Prostatitis      Seborrhea capitis      Tinnitus      Varicella without mention of complication             Past Surgical History:     Past Surgical History:   Procedure Laterality Date     COLONOSCOPY  2012     DENTAL SURGERY  1980    wisdom teeth     ENDOSCOPY DRUG INDUCED SLEEP N/A 6/3/2020    Procedure: DRUG-INDUCED SLEEP ENDOSCOPY;  Surgeon: Gladys Marroquin MD;  [facility]     IMPLANT GENERATOR STIMULATOR (LOCATION) Right 10/28/2020    Procedure: INSERTION, PULSE GENERATOR, NEUROSTIMULATOR;  Surgeon: Gladys Marroquin MD;  Location: [facility] OR     PHOTOREFRACTIVE KERATECTOMY  2000    s/p Lasik surgery     TONSILLECTOMY  1971    s/p Tonsillectomy            Medications     Current Outpatient Medications   Medication     albuterol (PROAIR HFA/PROVENTIL HFA/VENTOLIN HFA) 108 (90 Base) MCG/ACT inhaler     amLODIPine (NORVASC) 5 MG tablet     naltrexone-bupropion (CONTRAVE) 8-90 MG per 12 hr tablet     pravastatin (PRAVACHOL) 40 MG tablet     traZODone (DESYREL) 50 MG tablet     zolpidem (AMBIEN) 5 MG tablet     aspirin 81 MG tablet     fenofibrate (TRICOR) 145 MG tablet     losartan (COZAAR) 50 MG tablet     tamsulosin (FLOMAX) 0.4 MG capsule     No current facility-administered medications for this visit.            Family History:     Family History   Problem Relation Age of Onset     Cancer Father         lung, smoker     Breast Cancer Mother         at age  45     Depression Mother      Obesity Mother      Eye  Disorder Mother         cataracts     Hyperlipidemia Mother      Hypertension Mother      Coronary Artery Disease Mother         First MI betweeen 61 and 66 year of age, 3 MIs in past 15 years     Pulmonary Embolism Mother         on Eliquis     Coronary Artery Disease Brother         MI at age 30, pericarditis then MI     Coronary Artery Disease Brother         CABG x 4 at age 48, smoker     Hyperlipidemia Brother      Obesity Brother         Has lost over 100 pounds            Social History:     Social History     Socioeconomic History     Marital status:      Spouse name: Acosta Zuleta     Number of children: 0     Years of education: 18     Highest education level: Not on file   Occupational History     Occupation: Research Analyst     Employer: [employer]     Occupation: Consultant     Comment: self-employed   Tobacco Use     Smoking status: Never Smoker     Smokeless tobacco: Never Used     Tobacco comment: cigar occ   Substance and Sexual Activity     Alcohol use: Yes     Comment: 2-4 beers per week     Drug use: No     Sexual activity: Yes     Partners: Female     Birth control/protection: Pill   Other Topics Concern     Not on file   Social History Narrative    Social Documentation:        Balanced Diet: YES    Calcium intake: 1-2 per day    Caffeine: 1 cups coffee per day-during week    Exercise:  type of activity walk;  3 days / week  exercycle, treadmill    Sunscreen: Yes    Seatbelts:  Yes    Self Testicular Exam: Yes    Physical/Emotional/Sexual Abuse: No     Do you feel safe in your environment? Yes        Cholesterol screen up to date: No-Discussed    Eye Exam up to date: Yes    Dental Exam up to date: Yes    Dexa Scan up to date: Does Not Apply    Colonoscopy up to date: Does Not Apply    Immunizations up to date: Yes     Glucose screen if over 40:  No- Discussed        Brittany Canada MA     Social Determinants of Health     Financial Resource Strain: Not on file   Food  Insecurity: Not on file   Transportation Needs: Not on file   Physical Activity: Not on file   Stress: Not on file   Social Connections: Not on file   Intimate Partner Violence: Not on file   Housing Stability: Not on file            Allergies:   Liraglutide, Vitamin d3 [cholecalciferol], Hmg-coa-r inhibitors, and Azithromycin         Review of Systems:  From intake questionnaire   Negative 14 system review except as noted on HPI, nurse's note.         Physical Exam:   Patient is a 61 year old  male    General Appearance Adult: Alert, no acute distress, oriented  Lungs: no respiratory distress, or pursed lip breathing  Heart: No obvious jugular venous distension present  Skin: no suspicious lesions or rashes  Neuro: Alert, oriented, speech and mentation normal  Further examination is deferred due to the nature of our visit.        Labs and Pathology:    I personally reviewed all applicable laboratory data and went over findings with patient  Significant for:    CBC RESULTS:  Recent Labs   Lab Test 05/22/21  1129 08/12/14  1445   WBC 5.2 7.3   HGB 13.4 14.6    370        BMP RESULTS:  Recent Labs   Lab Test 08/18/21  1520 08/12/14  1445    137   POTASSIUM 5.0 4.2   CHLORIDE 105 104   CO2 30 26   ANIONGAP 1* 7   GLC 82 97   BUN 19 14   CR 1.21 1.17   GFRESTIMATED 65 65   GFRESTBLACK  --  79   RAVI 9.6 9.1       UA RESULTS:   Recent Labs   Lab Test 05/22/21  1125   SG >1.030   URINEPH 5.5   NITRITE Negative       PSA RESULTS  PSA   Date Value Ref Range Status   04/28/2005 2.05 0 - 4 ug/L Final     PSA  2.75 in 2011  7.41 in 2012  3.92 in 2013  3.44 in 2014  3.2 in 2015  3.4 in 2016  3.49 in 2019  4.25 in 2020    Prostatic Specific Antigen Screen (1/4/2022)  Specimen:  Blood   Ref Range & Units 3 mo ago   Prostatic Specific Antigen 0.0 - 4.0 ng/mL 4.7 High       Prostatic Specific Antigen Screen (3/29/2022)  Specimen:  Blood   Ref Range & Units 9 d ago   Prostatic Specific Antigen 0.0 - 4.0 ng/mL 6.2 High              Imaging:    I personally reviewed all applicable imaging and went over findings with patient.  Significant for: no recent relevant imaging          Assessment and Plan:     Assessment: 61 year old male with elevated PSA 6.2ng/mL from 3/29/2022, increased from PSA 4.7ng/mL from 1/4/2022.  His other most recent PSA values included 4.25ng/mL from 10/22/2020 and PSA 3.49ng/mL from 1/7/2019.  Patient without any baseline urinary symptoms to suggest BPH or infection.  Patient without a family history of prostate cancer.  At this time, we will plan to proceed with a prostate MRI and schedule for a prostate biopsy to follow; need to proceed with prostate biopsy pending results of MRI.     Plan:  - Schedule prostate MRI next available.   - Schedule MRI fusion prostate biopsy next available for a date following the prostate MRI.       MARI Mena  Department of Urology

## 2022-04-21 ENCOUNTER — TELEPHONE (OUTPATIENT)
Dept: GASTROENTEROLOGY | Facility: CLINIC | Age: 62
End: 2022-04-21

## 2022-04-21 NOTE — TELEPHONE ENCOUNTER
Attempted to contact patient regarding upcoming Colonoscopy procedure on 4.28.22 for pre assessment questions. No answer.     Left message to return call to [phone] #2    Covid test scheduled: 4.25.22    Pre op exam scheduled: 4.7.22 with Fouzia Burnett at PAC    Arrival time: 0730    Facility location: [facility]    Sedation type: MAC    Indication for procedure: Screening    Anticoagulants: No.    Bowel prep recommendation: Miralax/Magnesium citrate/Dulcolax      Luz Elena Brown RN

## 2022-04-25 ENCOUNTER — TELEPHONE (OUTPATIENT)
Dept: GASTROENTEROLOGY | Facility: CLINIC | Age: 62
End: 2022-04-25
Payer: COMMERCIAL

## 2022-04-25 ENCOUNTER — LAB (OUTPATIENT)
Dept: LAB | Facility: CLINIC | Age: 62
End: 2022-04-25
Attending: INTERNAL MEDICINE
Payer: COMMERCIAL

## 2022-04-25 ENCOUNTER — ALLIED HEALTH/NURSE VISIT (OUTPATIENT)
Dept: FAMILY MEDICINE | Facility: CLINIC | Age: 62
End: 2022-04-25

## 2022-04-25 VITALS — SYSTOLIC BLOOD PRESSURE: 130 MMHG | DIASTOLIC BLOOD PRESSURE: 88 MMHG

## 2022-04-25 DIAGNOSIS — Z01.30 BP CHECK: Primary | ICD-10-CM

## 2022-04-25 DIAGNOSIS — Z11.59 ENCOUNTER FOR SCREENING FOR OTHER VIRAL DISEASES: ICD-10-CM

## 2022-04-25 PROCEDURE — 99207 PR NO CHARGE NURSE ONLY: CPT

## 2022-04-25 PROCEDURE — U0005 INFEC AGEN DETEC AMPLI PROBE: HCPCS

## 2022-04-25 PROCEDURE — U0003 INFECTIOUS AGENT DETECTION BY NUCLEIC ACID (DNA OR RNA); SEVERE ACUTE RESPIRATORY SYNDROME CORONAVIRUS 2 (SARS-COV-2) (CORONAVIRUS DISEASE [COVID-19]), AMPLIFIED PROBE TECHNIQUE, MAKING USE OF HIGH THROUGHPUT TECHNOLOGIES AS DESCRIBED BY CMS-2020-01-R: HCPCS

## 2022-04-25 NOTE — TELEPHONE ENCOUNTER
[facility] Call Center    Reason for Call: Other: Pt returning call for procedure PA     Action Taken: Patient transferred to: Saadia Clark RN    Travel Screening: Not Applicable

## 2022-04-25 NOTE — PROGRESS NOTES
Speedy Carlson is a 61 year old patient who comes in today for a Blood Pressure check.  Initial BP:  /88      Data Unavailable  Disposition: pt will notify his provider of visit

## 2022-04-25 NOTE — TELEPHONE ENCOUNTER
Pre assessment questions completed for upcoming Colonoscopy procedure scheduled on 4/28/22    COVID test scheduled 4/25/22    Pre-op scheduled 4/7/22 at PAC    Reviewed procedural arrival time 0730 and facility location [facility].    Designated  policy reviewed. Instructed to have someone stay 24 hours post procedure.     Procedure indication: Screening    Bowel prep recommendation: Miralax    Prep instructions sent via [employer].    Reviewed Colonoscopy prep instructions with patient. No fiber/iron supplements or foods that contain nuts/seeds 7 days prior to procedure.     Anticoagulation/blood thinners? None    Electronic implanted devices? Has Neuro stimulator. He will bring remote.     On Naltrexone/Bupropion. Advised 3 day hold.     Patient verbalized understanding and had no questions or concerns at this time.    Saadia Clark RN

## 2022-04-26 LAB — SARS-COV-2 RNA RESP QL NAA+PROBE: NEGATIVE

## 2022-04-27 ENCOUNTER — TELEPHONE (OUTPATIENT)
Dept: UROLOGY | Facility: CLINIC | Age: 62
End: 2022-04-27
Payer: COMMERCIAL

## 2022-04-27 DIAGNOSIS — J45.20 MILD INTERMITTENT ASTHMA WITHOUT COMPLICATION: Primary | ICD-10-CM

## 2022-04-27 NOTE — TELEPHONE ENCOUNTER
[facility] Call Center    Phone Message    May a detailed message be left on voicemail: yes     Reason for Call: Other: Issa is calling stating that his doctor Dr. Mcmahon at [facility] told him that he should not wait until 6/8 to be seen. Issa would like someone to review and see if he should be sooner than that and call him back to discuss, thanks!     Action Taken: Message routed to:  [facility] [place] [facility]    Travel Screening: Not Applicable

## 2022-04-28 ENCOUNTER — ANESTHESIA (OUTPATIENT)
Dept: GASTROENTEROLOGY | Facility: CLINIC | Age: 62
End: 2022-04-28
Payer: COMMERCIAL

## 2022-04-28 ENCOUNTER — ANESTHESIA EVENT (OUTPATIENT)
Dept: GASTROENTEROLOGY | Facility: CLINIC | Age: 62
End: 2022-04-28
Payer: COMMERCIAL

## 2022-04-28 ENCOUNTER — HOSPITAL ENCOUNTER (OUTPATIENT)
Facility: CLINIC | Age: 62
Discharge: HOME | End: 2022-04-28
Attending: INTERNAL MEDICINE | Admitting: INTERNAL MEDICINE
Payer: COMMERCIAL

## 2022-04-28 VITALS
BODY MASS INDEX: 32.97 KG/M2 | HEART RATE: 89 BPM | SYSTOLIC BLOOD PRESSURE: 135 MMHG | TEMPERATURE: 98.4 F | HEIGHT: 72 IN | DIASTOLIC BLOOD PRESSURE: 101 MMHG | OXYGEN SATURATION: 97 % | WEIGHT: 243.39 LBS | RESPIRATION RATE: 14 BRPM

## 2022-04-28 LAB — COLONOSCOPY: NORMAL

## 2022-04-28 PROCEDURE — 258N000003 HC RX IP 258 OP 636: Performed by: NURSE ANESTHETIST, CERTIFIED REGISTERED

## 2022-04-28 PROCEDURE — 250N000011 HC RX IP 250 OP 636: Performed by: NURSE ANESTHETIST, CERTIFIED REGISTERED

## 2022-04-28 PROCEDURE — G0121 COLON CA SCRN NOT HI RSK IND: HCPCS | Performed by: INTERNAL MEDICINE

## 2022-04-28 PROCEDURE — 45378 DIAGNOSTIC COLONOSCOPY: CPT | Performed by: INTERNAL MEDICINE

## 2022-04-28 PROCEDURE — 370N000017 HC ANESTHESIA TECHNICAL FEE, PER MIN: Performed by: INTERNAL MEDICINE

## 2022-04-28 PROCEDURE — 250N000009 HC RX 250: Performed by: NURSE ANESTHETIST, CERTIFIED REGISTERED

## 2022-04-28 RX ORDER — LIDOCAINE 40 MG/G
CREAM TOPICAL
Status: DISCONTINUED | OUTPATIENT
Start: 2022-04-28 | End: 2022-04-28 | Stop reason: HOSPADM

## 2022-04-28 RX ORDER — ONDANSETRON 4 MG/1
4 TABLET, ORALLY DISINTEGRATING ORAL EVERY 30 MIN PRN
Status: DISCONTINUED | OUTPATIENT
Start: 2022-04-28 | End: 2022-04-28 | Stop reason: HOSPADM

## 2022-04-28 RX ORDER — SODIUM CHLORIDE, SODIUM LACTATE, POTASSIUM CHLORIDE, CALCIUM CHLORIDE 600; 310; 30; 20 MG/100ML; MG/100ML; MG/100ML; MG/100ML
INJECTION, SOLUTION INTRAVENOUS CONTINUOUS
Status: DISCONTINUED | OUTPATIENT
Start: 2022-04-28 | End: 2022-04-28 | Stop reason: HOSPADM

## 2022-04-28 RX ORDER — LIDOCAINE HYDROCHLORIDE 20 MG/ML
INJECTION, SOLUTION INFILTRATION; PERINEURAL PRN
Status: DISCONTINUED | OUTPATIENT
Start: 2022-04-28 | End: 2022-04-28

## 2022-04-28 RX ORDER — PROPOFOL 10 MG/ML
INJECTION, EMULSION INTRAVENOUS CONTINUOUS PRN
Status: DISCONTINUED | OUTPATIENT
Start: 2022-04-28 | End: 2022-04-28

## 2022-04-28 RX ORDER — PROPOFOL 10 MG/ML
INJECTION, EMULSION INTRAVENOUS PRN
Status: DISCONTINUED | OUTPATIENT
Start: 2022-04-28 | End: 2022-04-28

## 2022-04-28 RX ORDER — SODIUM CHLORIDE, SODIUM LACTATE, POTASSIUM CHLORIDE, CALCIUM CHLORIDE 600; 310; 30; 20 MG/100ML; MG/100ML; MG/100ML; MG/100ML
INJECTION, SOLUTION INTRAVENOUS CONTINUOUS PRN
Status: DISCONTINUED | OUTPATIENT
Start: 2022-04-28 | End: 2022-04-28

## 2022-04-28 RX ORDER — OXYCODONE HYDROCHLORIDE 5 MG/1
5 TABLET ORAL EVERY 4 HOURS PRN
Status: DISCONTINUED | OUTPATIENT
Start: 2022-04-28 | End: 2022-04-28 | Stop reason: HOSPADM

## 2022-04-28 RX ORDER — ONDANSETRON 2 MG/ML
4 INJECTION INTRAMUSCULAR; INTRAVENOUS
Status: DISCONTINUED | OUTPATIENT
Start: 2022-04-28 | End: 2022-04-28 | Stop reason: HOSPADM

## 2022-04-28 RX ORDER — FENTANYL CITRATE 50 UG/ML
25 INJECTION, SOLUTION INTRAMUSCULAR; INTRAVENOUS EVERY 5 MIN PRN
Status: DISCONTINUED | OUTPATIENT
Start: 2022-04-28 | End: 2022-04-28 | Stop reason: HOSPADM

## 2022-04-28 RX ORDER — ONDANSETRON 2 MG/ML
4 INJECTION INTRAMUSCULAR; INTRAVENOUS EVERY 30 MIN PRN
Status: DISCONTINUED | OUTPATIENT
Start: 2022-04-28 | End: 2022-04-28 | Stop reason: HOSPADM

## 2022-04-28 RX ORDER — HYDROMORPHONE HYDROCHLORIDE 1 MG/ML
0.2 INJECTION, SOLUTION INTRAMUSCULAR; INTRAVENOUS; SUBCUTANEOUS EVERY 5 MIN PRN
Status: DISCONTINUED | OUTPATIENT
Start: 2022-04-28 | End: 2022-04-28 | Stop reason: HOSPADM

## 2022-04-28 RX ADMIN — PROPOFOL 50 MG: 10 INJECTION, EMULSION INTRAVENOUS at 09:09

## 2022-04-28 RX ADMIN — LIDOCAINE HYDROCHLORIDE 60 MG: 20 INJECTION, SOLUTION INFILTRATION; PERINEURAL at 09:07

## 2022-04-28 RX ADMIN — PROPOFOL 150 MCG/KG/MIN: 10 INJECTION, EMULSION INTRAVENOUS at 09:07

## 2022-04-28 RX ADMIN — SODIUM CHLORIDE, POTASSIUM CHLORIDE, SODIUM LACTATE AND CALCIUM CHLORIDE: 600; 310; 30; 20 INJECTION, SOLUTION INTRAVENOUS at 09:05

## 2022-04-28 NOTE — ANESTHESIA POSTPROCEDURE EVALUATION
Patient: Speedy Carlson    Procedure: Procedure(s):  COLONOSCOPY       Anesthesia Type:  MAC    Note:  Disposition: Outpatient   Postop Pain Control: Uneventful            Sign Out: Well controlled pain   PONV: No   Neuro/Psych: Uneventful            Sign Out: Acceptable/Baseline neuro status   Airway/Respiratory: Uneventful            Sign Out: Acceptable/Baseline resp. status   CV/Hemodynamics: Uneventful            Sign Out: Acceptable CV status; No obvious hypovolemia; No obvious fluid overload   Other NRE: NONE   DID A NON-ROUTINE EVENT OCCUR? No           Last vitals:  Vitals Value Taken Time   /83 04/28/22 0943   Temp     Pulse 93 04/28/22 0943   Resp     SpO2 98 % 04/28/22 0948   Vitals shown include unvalidated device data.    Electronically Signed By: Gertrudis Miranda MD  April 28, 2022  9:49 AM

## 2022-04-28 NOTE — ANESTHESIA PREPROCEDURE EVALUATION
Anesthesia Pre-Procedure Evaluation    Patient: Speedy Carlson   MRN: [id number] : 1960        Procedure : Procedure(s):  COLONOSCOPY          Past Medical History:   Diagnosis Date     Acute prostatitis 2004     Calculus of kidney     no recurrence     Cervical radiculopathy 10/04/2016    R side     Class 1 obesity due to excess calories without serious comorbidity with body mass index (BMI) of 31.0 to 31.9 in adult 2020     Coronary artery disease due to lipid rich plaque     coronary arteryt calcium score of 22, 63rd percentile in 2016     Dry eye syndrome      Hypertension      Idiopathic urticaria      Impotence of organic origin      Insomnia      Mild intermittent asthma 1975    hx in childhood     Mixed hyperlipidemia      Obesity      HANNAH (obstructive sleep apnea)      Other acne      Prostatitis      Seborrhea capitis      Tinnitus      Varicella without mention of complication       Past Surgical History:   Procedure Laterality Date     COLONOSCOPY       DENTAL SURGERY  1980    wisdom teeth     ENDOSCOPY DRUG INDUCED SLEEP N/A 6/3/2020    Procedure: DRUG-INDUCED SLEEP ENDOSCOPY;  Surgeon: Gladys Marroquin MD;  [facility]     IMPLANT GENERATOR STIMULATOR (LOCATION) Right 10/28/2020    Procedure: INSERTION, PULSE GENERATOR, NEUROSTIMULATOR;  Surgeon: Gladys Marroquin MD;  Location: [facility] OR     PHOTOREFRACTIVE KERATECTOMY      s/p Lasik surgery     TONSILLECTOMY  1971    s/p Tonsillectomy      Allergies   Allergen Reactions     Liraglutide GI Disturbance     Saxenda      Vitamin D3 [Cholecalciferol] Rash     Hmg-Coa-R Inhibitors      Myalgias      Statins       Azithromycin Rash     Skin peeling      Social History     Tobacco Use     Smoking status: Never Smoker     Smokeless tobacco: Never Used     Tobacco comment: cigar occ   Substance Use Topics     Alcohol use: Yes     Comment: 2-4 beers per week      Wt Readings from Last 1 Encounters:   22  110.4 kg (243 lb 6.2 oz)        Anesthesia Evaluation   Pt has had prior anesthetic. Type: General.    No history of anesthetic complications       ROS/MED HX  ENT/Pulmonary:     (+) sleep apnea, asthma     Neurologic:       Cardiovascular:     (+) hypertension--CAD ---    METS/Exercise Tolerance: >4 METS    Hematologic:       Musculoskeletal:       GI/Hepatic:    (-) GERD   Renal/Genitourinary:     (+) Nephrolithiasis ,     Endo:     (+) Obesity,     Psychiatric/Substance Use:       Infectious Disease:       Malignancy:       Other:            Physical Exam    Airway        Mallampati: II   TM distance: > 3 FB   Neck ROM: full   Mouth opening: > 3 cm    Respiratory Devices and Support         Dental  no notable dental history         Cardiovascular             Pulmonary                   OUTSIDE LABS:  CBC:   Lab Results   Component Value Date    WBC 5.2 05/22/2021    WBC 7.3 08/12/2014    HGB 13.4 05/22/2021    HGB 14.6 08/12/2014    HCT 40.5 05/22/2021    HCT 43.4 08/12/2014     05/22/2021     08/12/2014     BMP:   Lab Results   Component Value Date     08/18/2021     08/12/2014    POTASSIUM 5.0 08/18/2021    POTASSIUM 4.2 08/12/2014    CHLORIDE 105 08/18/2021    CHLORIDE 104 08/12/2014    CO2 30 08/18/2021    CO2 26 08/12/2014    BUN 19 08/18/2021    BUN 14 08/12/2014    CR 1.21 08/18/2021    CR 1.17 08/12/2014    GLC 82 08/18/2021    GLC 97 08/12/2014     COAGS: No results found for: PTT, INR, FIBR  POC: No results found for: BGM, HCG, HCGS  HEPATIC:   Lab Results   Component Value Date    ALBUMIN 4.3 08/18/2021    PROTTOTAL 7.3 08/18/2021    ALT 43 08/18/2021    AST 29 08/18/2021    ALKPHOS 50 08/18/2021    BILITOTAL 0.9 08/18/2021     OTHER:   Lab Results   Component Value Date    A1C 5.0 01/14/2021    RAVI 9.6 08/18/2021    TSH 0.74 08/12/2014    T4 1.09 04/22/2010    CRP <2.9 01/14/2021    SED 3 01/14/2021       Anesthesia Plan    ASA Status:  3   NPO Status:  NPO Appropriate     Anesthesia Type: MAC.     - Reason for MAC: straight local not clinically adequate   Induction: Propofol.   Maintenance: TIVA.        Consents    Anesthesia Plan(s) and associated risks, benefits, and realistic alternatives discussed. Questions answered and patient/representative(s) expressed understanding.    - Discussed:     - Discussed with:  Patient      - Extended Intubation/Ventilatory Support Discussed: No.      - Patient is DNR/DNI Status: No    Use of blood products discussed: No .     Postoperative Care       PONV prophylaxis: Ondansetron (or other 5HT-3), Background Propofol Infusion     Comments:                Gertrudis Miranda MD

## 2022-04-28 NOTE — OR NURSING
Procedure: Colonoscopy Screening  Sedation: MAC  Tolerated: VS stable during and post procedure. Not c/o abdominal or chest pain.  Report: Given to Nancy AVILA RN  Pt to recovery area in stable condition, accompanied by RN    Vanessa Alva RN

## 2022-04-28 NOTE — H&P
Speedy Carlson  [id number]  male  61 year old      Reason for procedure/surgery: Screening colonoscopy.    Patient Active Problem List   Diagnosis     Other acne     Tear film insufficiency     Hyperlipidemia LDL goal <130     HANNAH (obstructive sleep apnea)- severe (AHI 52)     Advance directive on file     ED (erectile dysfunction)     Family history of early CAD     Hypertension     Hypovitaminosis D     Insomnia     Seborrhea capitis     Tinnitus     Overweight (BMI 25.0-29.9)     Hx of obesity     Coronary artery disease due to lipid rich plaque     Mild intermittent asthma without complication     Chronic idiopathic urticaria       Past Surgical History:    Past Surgical History:   Procedure Laterality Date     COLONOSCOPY  2012     DENTAL SURGERY  1980    wisdom teeth     ENDOSCOPY DRUG INDUCED SLEEP N/A 6/3/2020    Procedure: DRUG-INDUCED SLEEP ENDOSCOPY;  Surgeon: Gladys Marroquin MD;  [facility]     IMPLANT GENERATOR STIMULATOR (LOCATION) Right 10/28/2020    Procedure: INSERTION, PULSE GENERATOR, NEUROSTIMULATOR;  Surgeon: Gladys Marroquin MD;  Location: [facility] OR     PHOTOREFRACTIVE KERATECTOMY  2000    s/p Lasik surgery     TONSILLECTOMY  1971    s/p Tonsillectomy       Past Medical History:   Past Medical History:   Diagnosis Date     Acute prostatitis 12/2004     Calculus of kidney 1994    no recurrence     Cervical radiculopathy 10/04/2016    R side     Class 1 obesity due to excess calories without serious comorbidity with body mass index (BMI) of 31.0 to 31.9 in adult 07/06/2020     Coronary artery disease due to lipid rich plaque     coronary arteryt calcium score of 22, 63rd percentile in 2016     Dry eye syndrome      Hypertension      Idiopathic urticaria      Impotence of organic origin      Insomnia      Mild intermittent asthma 1975    hx in childhood     Mixed hyperlipidemia      Obesity      HANNAH (obstructive sleep apnea)      Other acne      Prostatitis      Seborrhea  capitis      Tinnitus      Varicella without mention of complication        Social History:   Social History     Tobacco Use     Smoking status: Never Smoker     Smokeless tobacco: Never Used     Tobacco comment: cigar occ   Substance Use Topics     Alcohol use: Yes     Comment: 2-4 beers per week       Family History:   Family History   Problem Relation Age of Onset     Cancer Father         lung, smoker     Breast Cancer Mother         at age  45     Depression Mother      Obesity Mother      Eye Disorder Mother         cataracts     Hyperlipidemia Mother      Hypertension Mother      Coronary Artery Disease Mother         First MI betweeen 60 and 65 year of age, 3 MIs in past 15 years     Pulmonary Embolism Mother         on Eliquis     Coronary Artery Disease Brother         MI at age 30, pericarditis then MI     Coronary Artery Disease Brother         CABG x 4 at age 48, smoker     Hyperlipidemia Brother      Obesity Brother         Has lost over 100 pounds       Allergies:   Allergies   Allergen Reactions     Liraglutide GI Disturbance     Saxenda      Vitamin D3 [Cholecalciferol] Rash     Hmg-Coa-R Inhibitors      Myalgias      Statins       Azithromycin Rash     Skin peeling       Active Medications:   No current outpatient medications on file.       Systemic Review:   CONSTITUTIONAL: NEGATIVE for fever, chills, change in weight  ENT/MOUTH: NEGATIVE for ear, mouth and throat problems  RESP: NEGATIVE for significant cough or SOB  CV: NEGATIVE for chest pain, palpitations or peripheral edema    Physical Examination:   Vital Signs: BP (!) 145/99   Temp 98.4  F (36.9  C) (Oral)   Resp 14   Ht 1.829 m (6')   Wt 110.4 kg (243 lb 6.2 oz)   SpO2 97%   BMI 33.01 kg/m    GENERAL: healthy, alert and no distress  NECK: no adenopathy, no asymmetry, masses, or scars  RESP: lungs clear to auscultation - no rales, rhonchi or wheezes  CV: regular rate and rhythm, normal S1 S2, no S3 or S4, no murmur, click or rub, no  peripheral edema and peripheral pulses strong  ABDOMEN: soft, nontender, no hepatosplenomegaly, no masses and bowel sounds normal  MS: no gross musculoskeletal defects noted, no edema    Plan: Appropriate to proceed as scheduled.      Echo Caldera MD  4/28/2022    PCP:  Steve Mcmahon

## 2022-04-28 NOTE — ANESTHESIA CARE TRANSFER NOTE
Patient: Speedy Carlson    Procedure: Procedure(s):  COLONOSCOPY       Diagnosis: Contact with and (suspected) exposure to covid-19 [Z20.822]  Diagnosis Additional Information: No value filed.    Anesthesia Type:   MAC     Note:    Oropharynx: oropharynx clear of all foreign objects and spontaneously breathing  Level of Consciousness: awake  Oxygen Supplementation: room air    Independent Airway: airway patency satisfactory and stable  Dentition: dentition unchanged  Vital Signs Stable: post-procedure vital signs reviewed and stable  Report to RN Given: handoff report given  Patient transferred to: Phase II    Handoff Report: Identifed the Patient, Identified the Reponsible Provider, Reviewed the pertinent medical history, Discussed the surgical course, Reviewed Intra-OP anesthesia mangement and issues during anesthesia, Set expectations for post-procedure period and Allowed opportunity for questions and acknowledgement of understanding      Vitals:  Vitals Value Taken Time   /78    Temp 37    Pulse 89    Resp 14    SpO2 99        Electronically Signed By: Charles Patrick Schlatter, APRN CRNA  April 28, 2022  9:39 AM

## 2022-05-02 NOTE — TELEPHONE ENCOUNTER
Mary Hayes RN  You; Agustina, Zeynep 4 days ago     JT    He is welcome to see someone else at [facility] or if someone has a sooner appt but that is the soonest with Dr. Song.  He will just have to have a regular biopsy without MRI.  Thank you      Writer called and spoke to pt. Writer stated that we do not have availability any sooner but pt can be seen at another location even with another provider, writer stated Dr Roche at [facility].

## 2022-05-03 ENCOUNTER — ONCOLOGY VISIT (OUTPATIENT)
Dept: ONCOLOGY | Facility: CLINIC | Age: 62
End: 2022-05-03
Attending: STUDENT IN AN ORGANIZED HEALTH CARE EDUCATION/TRAINING PROGRAM
Payer: COMMERCIAL

## 2022-05-03 VITALS
WEIGHT: 252.1 LBS | HEART RATE: 105 BPM | SYSTOLIC BLOOD PRESSURE: 134 MMHG | DIASTOLIC BLOOD PRESSURE: 90 MMHG | BODY MASS INDEX: 34.19 KG/M2 | OXYGEN SATURATION: 97 % | RESPIRATION RATE: 18 BRPM

## 2022-05-03 DIAGNOSIS — R97.20 ELEVATED PROSTATE SPECIFIC ANTIGEN (PSA): Primary | ICD-10-CM

## 2022-05-03 PROCEDURE — 99213 OFFICE O/P EST LOW 20 MIN: CPT | Performed by: STUDENT IN AN ORGANIZED HEALTH CARE EDUCATION/TRAINING PROGRAM

## 2022-05-03 PROCEDURE — G0463 HOSPITAL OUTPT CLINIC VISIT: HCPCS

## 2022-05-03 ASSESSMENT — PAIN SCALES - GENERAL: PAINLEVEL: NO PAIN (0)

## 2022-05-03 NOTE — PATIENT INSTRUCTIONS
Please Schedule for a 12 core standard prostate biopsy at the [facility].

## 2022-05-03 NOTE — PROGRESS NOTES
CHIEF COMPLAINT   It was my pleasure to see Speedy Carlson who is a 61 year old male for follow-up of elevated PSA.      HPI:  Speedy Carlson is a 61 year old male being seen for elevated PSA.  Duration of problem: 2 years  Previous treatments: None      Reviewed previous notes from Cinthia Graham PA-C  Persistently rising PSA levels for the last 3 to 4 years from 3.9 at 2019 to about 6.2 in March 2022  No family history of prostate cancer  No recent catheterization or instrumentation with the urethra  Past history of prostatitis with treatment of antibiotics about 10 years ago  Plan for MRI in the past but could not get it done in view of an implant for his HANNAH symptoms    Exam:  BP (!) 134/90   Pulse 105   Resp 18   Wt 114.4 kg (252 lb 1.6 oz)   SpO2 97%   BMI 34.19 kg/m    General: age-appropriate appearing male in NAD sitting in an exam chair  Resp: no respiratory distress  CV: heart rate regular  Abdomen: Degree of obesity is moderate. Abdomen is soft and nontender. No organomegaly.   Rectal exam: Enlarged prostate no nodules were palpable though  Neuro: grossly non focal. Normal reflexes  Motor: excellent strength throughout    Review of Imaging:  The following imaging exams were independently viewed and interpreted by me and discussed with patient:      Review of Labs:  The following labs were reviewed by me and discussed with the patient:  PSA: Abnormal: 6.2 in March 2022  4.7 in January 2022    Assessment & Plan     Elevated prostate specific antigen (PSA)  Discussed in detail about his PSA rise  Also talked about additional test that could help us decide about prostate biopsy including 4K score test, intact PSA, urine exosome test  Based on that he is a persistent PSA rise over the last 2 to 3 years I think we should do a prostate biopsy and lieu of a MRI prostate which has been being not possible because of his implant  Discussed about the procedure of prostate biopsy and likely issues that can  arise including hematuria, and UTI with sepsis  Based on the discussion we had Issa is agreeable to proceed ahead with the prostate biopsy as planned  We will have him scheduled for the same        Antwan JUAN JOSE Roche MD  [facility]      ==========================    Additional Billing and Coding Information:  Review of external notes as documented above   Review of the result(s) of each unique test - PSA    Independent interpretation of a test performed by another physician/other qualified health care professional (not separately reported) -       Discussion of management or test interpretation with external physician/other qualified healthcare professional/appropriate source -       Diagnosis or treatment significantly limited by social determinants of health -       22 minutes spent on the date of the encounter doing chart review, review of test results, interpretation of tests, patient visit and documentation     ==========================

## 2022-05-03 NOTE — PROGRESS NOTES
Urology Rooming Note    May 3, 2022 2:59 PM   Speedy Carlson is a 61 year old male who presents for:    Chief Complaint   Patient presents with     Urology     Initial Vitals: BP (!) 134/90   Pulse 105   Resp 18   Wt 114.4 kg (252 lb 1.6 oz)   SpO2 97%   BMI 34.19 kg/m   Estimated body mass index is 34.19 kg/m  as calculated from the following:    Height as of 4/28/22: 1.829 m (6').    Weight as of this encounter: 114.4 kg (252 lb 1.6 oz). Body surface area is 2.41 meters squared.  No Pain (0) Comment: Data Unavailable     Allergies reviewed: Yes  Medications reviewed: Yes    Medications: Medication refills not needed today.  Pharmacy name entered into EPIC:    [facility] [address] [id number] - [place], [place] - [address] AT [facility] [address]

## 2022-05-03 NOTE — LETTER
5/3/2022         RE: Speedy Carlson  [address] [facility] [address]        Dear Colleague,    Thank you for referring your patient, Speedy Carlson, to the [facility]. Please see a copy of my visit note below.    Urology Rooming Note    May 3, 2022 2:59 PM   Speedy Carlson is a 61 year old male who presents for:    Chief Complaint   Patient presents with     Urology     Initial Vitals: BP (!) 134/90   Pulse 105   Resp 18   Wt 114.4 kg (252 lb 1.6 oz)   SpO2 97%   BMI 34.19 kg/m   Estimated body mass index is 34.19 kg/m  as calculated from the following:    Height as of 4/28/22: 1.829 m (6').    Weight as of this encounter: 114.4 kg (252 lb 1.6 oz). Body surface area is 2.41 meters squared.  No Pain (0) Comment: Data Unavailable     Allergies reviewed: Yes  Medications reviewed: Yes    Medications: Medication refills not needed today.  Pharmacy name entered into EPIC:    [facility] [address]  [id number] - [place], [place] - [address] AT [facility] [address]    CHIEF COMPLAINT   It was my pleasure to see Speedy Carlson who is a 61 year old male for follow-up of elevated PSA.      HPI:  Speedy Carlson is a 61 year old male being seen for elevated PSA.  Duration of problem: 2 years  Previous treatments: None      Reviewed previous notes from Cinthia Graham PA-C  Persistently rising PSA levels for the last 3 to 4 years from 3.9 at 2019 to about 6.2 in March 2022  No family history of prostate cancer  No recent catheterization or instrumentation with the urethra  Past history of prostatitis with treatment of antibiotics about 10 years ago  Plan for MRI in the past but could not get it done in view of an implant for his HANNAH symptoms    Exam:  BP (!) 134/90   Pulse 105   Resp 18   Wt 114.4 kg (252 lb 1.6 oz)   SpO2 97%   BMI 34.19 kg/m    General: age-appropriate appearing male in  NAD sitting in an exam chair  Resp: no respiratory distress  CV: heart rate regular  Abdomen: Degree of obesity is moderate. Abdomen is soft and nontender. No organomegaly.   Rectal exam: Enlarged prostate no nodules were palpable though  Neuro: grossly non focal. Normal reflexes  Motor: excellent strength throughout    Review of Imaging:  The following imaging exams were independently viewed and interpreted by me and discussed with patient:      Review of Labs:  The following labs were reviewed by me and discussed with the patient:  PSA: Abnormal: 6.2 in March 2022  4.7 in January 2022    Assessment & Plan     Elevated prostate specific antigen (PSA)  Discussed in detail about his PSA rise  Also talked about additional test that could help us decide about prostate biopsy including 4K score test, intact PSA, urine exosome test  Based on that he is a persistent PSA rise over the last 2 to 3 years I think we should do a prostate biopsy and lieu of a MRI prostate which has been being not possible because of his implant  Discussed about the procedure of prostate biopsy and likely issues that can arise including hematuria, and UTI with sepsis  Based on the discussion we had Issa is agreeable to proceed ahead with the prostate biopsy as planned  We will have him scheduled for the same        Antwan JUAN JOSE Roche MD  [facility]      ==========================    Additional Billing and Coding Information:  Review of external notes as documented above   Review of the result(s) of each unique test - PSA    Independent interpretation of a test performed by another physician/other qualified health care professional (not separately reported) -       Discussion of management or test interpretation with external physician/other qualified healthcare professional/appropriate source -       Diagnosis or treatment significantly limited by social determinants of health -       22 minutes spent on the date of  the encounter doing chart review, review of test results, interpretation of tests, patient visit and documentation     ==========================      Again, thank you for allowing me to participate in the care of your patient.        Sincerely,        Antwan Roche MD

## 2022-05-04 NOTE — PROGRESS NOTES
Virtual Visit Check-In    During this virtual visit the patient is located in [place], patient verifies this as the location during the entirety of this visit.     Issa is a 61 year old who is being evaluated via a billable video visit.      How would you like to obtain your AVS? MyChart  If the video visit is dropped, the invitation should be resent by: Send to e-mail at: [email or web address]  Will anyone else be joining your video visit? No    Video Start Time: 10:51 AM    Video-Visit Details    Type of service:  Video Visit    Video End Time:11:10 AM    Originating Location (pt. Location): Home    Distant Location (provider location):  [facility] [place]     Platform used for Video Visit: Melissa Gil NREMT      20 minutes spent on the date of the encounter doing chart review, history and exam, documentation and further activities per the note    Return Medical Weight Management Note     Speedy Carlson  MRN:  [id number]  :  1960  CAMILO:  2022    Dear Steve Mcmahon MD,    I had the pleasure of seeing your patient Speedy Carlson. He is a 61 year old male who I am continuing to see for treatment of obesity related to:       2019   I have the following health issues associated with obesity: Heart Disease, High Blood Pressure, High Cholesterol, Sleep Apnea   I have the following symptoms associated with obesity: None of the above       Assessment & Plan   Problem List Items Addressed This Visit     Overweight (BMI 25.0-29.9)    Relevant Medications    naltrexone-bupropion (CONTRAVE) 8-90 MG per 12 hr tablet           INTERVAL HISTORY:  MWM follow up  Weight loss from 252 to 239 lbs today  Taking Contrave, not always remembering 2nd evening dose. Doesn't feel it is as effective as it was when first starting  Protein shake for breakfast meal replacement   Less walking/exercise   Intermittent fasting 6pm-7am    AOM:  Saxenda/ozempic: GI upset,  abdominal cramping  Topiramate: anxiety, shaky  Phentermine: not appropriate due to family cardiac history     PLAN:  Continue Contrave same dose  Follow up 6 months return MWM          CURRENT WEIGHT:   239 lbs 0 oz    Initial Weight (lbs): 252.3 lbs  Last Visits Weight: 105.7 kg (233 lb)  Cumulative weight loss (lbs): 13.3  Weight Loss Percentage: 5.27%    Changes and Difficulties 5/2/2022   I have made the following changes to my diet since my last visit: Still reduced intake of pasta, and fried foods   With regards to my diet, I am still struggling with: portion control/sweets   I have made the following changes to my activity/exercise since my last visit: no change/no strenuous workouts yet, more walks/etc.   With regards to my activity/exercise, I am still struggling with: motivation         MEDICATIONS:   Current Outpatient Medications   Medication Sig Dispense Refill     naltrexone-bupropion (CONTRAVE) 8-90 MG per 12 hr tablet Take 2 tablets by mouth 2 times daily 120 tablet 5     albuterol (PROAIR HFA/PROVENTIL HFA/VENTOLIN HFA) 108 (90 Base) MCG/ACT inhaler Inhale 1-2 puffs into the lungs every 4 hours as needed        amLODIPine (NORVASC) 5 MG tablet Take 5 mg by mouth every morning        pravastatin (PRAVACHOL) 40 MG tablet Take 40 mg by mouth every morning        traZODone (DESYREL) 50 MG tablet Take 150 mg by mouth At Bedtime        zolpidem (AMBIEN) 5 MG tablet Take 5 mg by mouth nightly as needed for sleep          Weight Loss Medication History Reviewed With Patient 5/2/2022   Which weight loss medications are you currently taking on a regular basis?  Contrave (naltrexone/bupropion)   Are you having any side effects from the weight loss medication that we have prescribed you? No   If you are having side effects please describe: -       Admission on 04/28/2022, Discharged on 04/28/2022   Component Date Value Ref Range Status     COLONOSCOPY 04/28/2022    Final                    Value:[facility]  [address] [place] [address] [phone]     Endoscopy Department  _______________________________________________________________________________  Patient Name: Speedy Carlson           Procedure Date: 4/28/2022 8:57 AM  MRN: [id number]                       Account Number: [id number]  YOB: 1960             Admit Type: Outpatient  Age: 61                               Room: [place] [address]  Gender: Male                          Note Status: Finalized  Attending MD: Echo Cadlera MD  Pause for the Cause: completed  Total Sedation Time:                    _______________________________________________________________________________     Procedure:             Colonoscopy  Indications:           Screening for colorectal malignant neoplasm  Providers:             Echo Caldera MD, Vanessa Alva RN  Referring MD:          MONALISA SPIVEY MD  Medicines:             Monitored Anesthesia Care  Complications:         No                           immediate complications.  _______________________________________________________________________________  Procedure:             Pre-Anesthesia Assessment:                         - Prior to the procedure, a History and Physical was                          performed, and patient medications and allergies were                          reviewed. The patient is competent. The risks and                          benefits of the procedure and the sedation options and                          risks were discussed with the patient. All questions                          were answered and informed consent was obtained.                          Patient identification and proposed procedure were                          verified by the physician in the pre-procedure area.                          Mental Status Examination: normal. Airway Examination:                          normal oropharyngeal airway and neck mobility.                           Respiratory Examination: clear to auscultation.                           CV                          Examination: normal. ASA Grade Assessment: I - A                          normal, healthy patient. After reviewing the risks and                          benefits, the patient was deemed in satisfactory                          condition to undergo the procedure. The anesthesia                          plan was to use monitored anesthesia care (MAC).                          Immediately prior to administration of medications,                          the patient was re-assessed for adequacy to receive                          sedatives. The heart rate, respiratory rate, oxygen                          saturations, blood pressure, adequacy of pulmonary                          ventilation, and response to care were monitored                          throughout the procedure. The physical status of the                          patient was re-assessed after the procedure.                         After obtaining informed consent, the colonoscope was                                                    passed under direct vision. Throughout the procedure,                          the patient's blood pressure, pulse, and oxygen                          saturations were monitored continuously. The was                          introduced through the anus and advanced to the                          terminal ileum. The colonoscopy was performed without                          difficulty. The patient tolerated the procedure well.                          The quality of the bowel preparation was good.                          Anatomical landmarks were photographed.                                                                                   Findings:       The perianal and digital rectal examinations were normal.       A few small-mouthed diverticula were found in the sigmoid colon.       The exam was  otherwise without abnormality.                                                                                   Impression:            - Diverticulosis in the sigmoid colo                          n.                         - The examination was otherwise normal.                         - No specimens collected.  Recommendation:        - Discharge patient to home (with escort).                         - Repeat colonoscopy in 10 years for screening                          purposes.                         - Return to referring physician as previously                          scheduled.                                                                                     electronically signed by KARYN Caldera  _____________________  Echo Caldera MD  4/28/2022 9:38:07 AM  I was physically present for the entire viewing portion of the exam.  __________________________  Signature of teaching physician  [id number] Caldera MD  Number of Addenda: 0    Note Initiated On: 4/28/2022 8:57 AM           PHYSICAL EXAM:  Objective    Ht 1.829 m (6')   Wt 108.4 kg (239 lb)   BMI 32.41 kg/m             Vitals:  No vitals were obtained today due to virtual visit.    GENERAL: Healthy, alert and no distress  EYES: Eyes grossly normal to inspection.  No discharge or erythema, or obvious scleral/conjunctival abnormalities.  RESP: No audible wheeze, cough, or visible cyanosis.  No visible retractions or increased work of breathing.    SKIN: Visible skin clear. No significant rash, abnormal pigmentation or lesions.  NEURO: Cranial nerves grossly intact.  Mentation and speech appropriate for age.  PSYCH: Mentation appears normal, affect normal/bright, judgement and insight intact, normal speech and appearance well-groomed.        Sincerely,    Mary Childers PA-C

## 2022-05-05 ENCOUNTER — VIRTUAL VISIT (OUTPATIENT)
Dept: ENDOCRINOLOGY | Facility: CLINIC | Age: 62
End: 2022-05-05
Payer: COMMERCIAL

## 2022-05-05 ENCOUNTER — TELEPHONE (OUTPATIENT)
Dept: SURGERY | Facility: CLINIC | Age: 62
End: 2022-05-05

## 2022-05-05 VITALS — HEIGHT: 72 IN | BODY MASS INDEX: 32.37 KG/M2 | WEIGHT: 239 LBS

## 2022-05-05 DIAGNOSIS — Z79.2 PROPHYLACTIC ANTIBIOTIC: Primary | ICD-10-CM

## 2022-05-05 DIAGNOSIS — E66.3 OVERWEIGHT (BMI 25.0-29.9): ICD-10-CM

## 2022-05-05 PROCEDURE — 99213 OFFICE O/P EST LOW 20 MIN: CPT | Mod: 95 | Performed by: PHYSICIAN ASSISTANT

## 2022-05-05 RX ORDER — NALTREXONE HYDROCHLORIDE AND BUPROPION HYDROCHLORIDE 8; 90 MG/1; MG/1
2 TABLET, EXTENDED RELEASE ORAL 2 TIMES DAILY
Qty: 120 TABLET | Refills: 5 | Status: SHIPPED | OUTPATIENT
Start: 2022-05-05 | End: 2023-02-14

## 2022-05-05 RX ORDER — CIPROFLOXACIN 500 MG/1
500 TABLET, FILM COATED ORAL 2 TIMES DAILY
Qty: 6 TABLET | Refills: 0 | Status: SHIPPED | OUTPATIENT
Start: 2022-05-05 | End: 2022-05-08

## 2022-05-05 NOTE — LETTER
2022       RE: Speedy Carlson  [address] [facility] [address]     Dear Colleague,    Thank you for referring your patient, Speedy Carlson, to the [facility] [place] at [facility]. Please see a copy of my visit note below.    Virtual Visit Check-In    During this virtual visit the patient is located in [place], patient verifies this as the location during the entirety of this visit.     Issa is a 61 year old who is being evaluated via a billable video visit.      How would you like to obtain your AVS? MyChart  If the video visit is dropped, the invitation should be resent by: Send to e-mail at: [email or web address]  Will anyone else be joining your video visit? No    Video Start Time: 10:51 AM    Video-Visit Details    Type of service:  Video Visit    Video End Time:11:10 AM    Originating Location (pt. Location): Home    Distant Location (provider location):  [facility] [place]     Platform used for Video Visit: Melissa Gil NREMT      20 minutes spent on the date of the encounter doing chart review, history and exam, documentation and further activities per the note    Return Medical Weight Management Note     Speedy Carlson  MRN:  [id number]  :  1960  CAMILO:  2022    Dear Steve Mcmahon MD,    I had the pleasure of seeing your patient Speedy Carlson. He is a 61 year old male who I am continuing to see for treatment of obesity related to:       2019   I have the following health issues associated with obesity: Heart Disease, High Blood Pressure, High Cholesterol, Sleep Apnea   I have the following symptoms associated with obesity: None of the above       Assessment & Plan   Problem List Items Addressed This Visit     Overweight (BMI 25.0-29.9)    Relevant Medications    naltrexone-bupropion (CONTRAVE) 8-90 MG per 12 hr tablet            INTERVAL HISTORY:  MW follow up  Weight loss from 252 to 239 lbs today  Taking Contrave, not always remembering 2nd evening dose. Doesn't feel it is as effective as it was when first starting  Protein shake for breakfast meal replacement   Less walking/exercise   Intermittent fasting 6pm-7am    AOM:  Saxenda/ozempic: GI upset, abdominal cramping  Topiramate: anxiety, shaky  Phentermine: not appropriate due to family cardiac history     PLAN:  Continue Contrave same dose  Follow up 6 months return [facility]          CURRENT WEIGHT:   239 lbs 0 oz    Initial Weight (lbs): 252.3 lbs  Last Visits Weight: 105.7 kg (233 lb)  Cumulative weight loss (lbs): 13.3  Weight Loss Percentage: 5.27%    Changes and Difficulties 5/2/2022   I have made the following changes to my diet since my last visit: Still reduced intake of pasta, and fried foods   With regards to my diet, I am still struggling with: portion control/sweets   I have made the following changes to my activity/exercise since my last visit: no change/no strenuous workouts yet, more walks/etc.   With regards to my activity/exercise, I am still struggling with: motivation         MEDICATIONS:   Current Outpatient Medications   Medication Sig Dispense Refill     naltrexone-bupropion (CONTRAVE) 8-90 MG per 12 hr tablet Take 2 tablets by mouth 2 times daily 120 tablet 5     albuterol (PROAIR HFA/PROVENTIL HFA/VENTOLIN HFA) 108 (90 Base) MCG/ACT inhaler Inhale 1-2 puffs into the lungs every 4 hours as needed        amLODIPine (NORVASC) 5 MG tablet Take 5 mg by mouth every morning        pravastatin (PRAVACHOL) 40 MG tablet Take 40 mg by mouth every morning        traZODone (DESYREL) 50 MG tablet Take 150 mg by mouth At Bedtime        zolpidem (AMBIEN) 5 MG tablet Take 5 mg by mouth nightly as needed for sleep          Weight Loss Medication History Reviewed With Patient 5/2/2022   Which weight loss medications are you currently taking on a regular basis?  Contrave  (naltrexone/bupropion)   Are you having any side effects from the weight loss medication that we have prescribed you? No   If you are having side effects please describe: -       Admission on 04/28/2022, Discharged on 04/28/2022   Component Date Value Ref Range Status     COLONOSCOPY 04/28/2022    Final                    Value:[facility]  [address] [place] [address] [phone]     Endoscopy Department  _______________________________________________________________________________  Patient Name: Speedy Carlson           Procedure Date: 4/28/2022 8:57 AM  MRN: [id number]                       Account Number: [id number]  YOB: 1960             Admit Type: Outpatient  Age: 61                               Room: [place] [address]  Gender: Male                          Note Status: Finalized  Attending MD: Echo Caldera MD  Pause for the Cause: completed  Total Sedation Time:                    _______________________________________________________________________________     Procedure:             Colonoscopy  Indications:           Screening for colorectal malignant neoplasm  Providers:             Echo Caldera MD, Vanessa Alva RN  Referring MD:          MONALISA SPIVEY MD  Medicines:             Monitored Anesthesia Care  Complications:         No                           immediate complications.  _______________________________________________________________________________  Procedure:             Pre-Anesthesia Assessment:                         - Prior to the procedure, a History and Physical was                          performed, and patient medications and allergies were                          reviewed. The patient is competent. The risks and                          benefits of the procedure and the sedation options and                          risks were discussed with the patient. All questions                          were answered and  informed consent was obtained.                          Patient identification and proposed procedure were                          verified by the physician in the pre-procedure area.                          Mental Status Examination: normal. Airway Examination:                          normal oropharyngeal airway and neck mobility.                          Respiratory Examination: clear to auscultation.                           CV                          Examination: normal. ASA Grade Assessment: I - A                          normal, healthy patient. After reviewing the risks and                          benefits, the patient was deemed in satisfactory                          condition to undergo the procedure. The anesthesia                          plan was to use monitored anesthesia care (MAC).                          Immediately prior to administration of medications,                          the patient was re-assessed for adequacy to receive                          sedatives. The heart rate, respiratory rate, oxygen                          saturations, blood pressure, adequacy of pulmonary                          ventilation, and response to care were monitored                          throughout the procedure. The physical status of the                          patient was re-assessed after the procedure.                         After obtaining informed consent, the colonoscope was                                                    passed under direct vision. Throughout the procedure,                          the patient's blood pressure, pulse, and oxygen                          saturations were monitored continuously. The was                          introduced through the anus and advanced to the                          terminal ileum. The colonoscopy was performed without                          difficulty. The patient tolerated the procedure well.                          The quality of  the bowel preparation was good.                          Anatomical landmarks were photographed.                                                                                   Findings:       The perianal and digital rectal examinations were normal.       A few small-mouthed diverticula were found in the sigmoid colon.       The exam was otherwise without abnormality.                                                                                   Impression:            - Diverticulosis in the sigmoid colo                          n.                         - The examination was otherwise normal.                         - No specimens collected.  Recommendation:        - Discharge patient to home (with escort).                         - Repeat colonoscopy in 10 years for screening                          purposes.                         - Return to referring physician as previously                          scheduled.                                                                                     electronically signed by KARYN Caldera  _____________________  Echo Caldera MD  4/28/2022 9:38:07 AM  I was physically present for the entire viewing portion of the exam.  __________________________  Signature of teaching physician  [id number] Caldera MD  Number of Addenda: 0    Note Initiated On: 4/28/2022 8:57 AM           PHYSICAL EXAM:  Objective    Ht 1.829 m (6')   Wt 108.4 kg (239 lb)   BMI 32.41 kg/m             Vitals:  No vitals were obtained today due to virtual visit.    GENERAL: Healthy, alert and no distress  EYES: Eyes grossly normal to inspection.  No discharge or erythema, or obvious scleral/conjunctival abnormalities.  RESP: No audible wheeze, cough, or visible cyanosis.  No visible retractions or increased work of breathing.    SKIN: Visible skin clear. No significant rash, abnormal pigmentation or lesions.  NEURO: Cranial nerves grossly intact.  Mentation and speech appropriate for  age.  PSYCH: Mentation appears normal, affect normal/bright, judgement and insight intact, normal speech and appearance well-groomed.        Sincerely,    Mary Childers PA-C

## 2022-05-05 NOTE — NURSING NOTE
Chief Complaint   Patient presents with     Follow Up     Return medical weight management.         Vitals:    05/05/22 0959   Weight: 239 lb       Body mass index is 32.41 kg/m .      Gus Gil, EMT  [facility]

## 2022-05-12 ENCOUNTER — MYC MEDICAL ADVICE (OUTPATIENT)
Dept: FAMILY MEDICINE | Facility: CLINIC | Age: 62
End: 2022-05-12

## 2022-05-12 ENCOUNTER — ANCILLARY PROCEDURE (OUTPATIENT)
Dept: GENERAL RADIOLOGY | Facility: CLINIC | Age: 62
End: 2022-05-12
Attending: NURSE PRACTITIONER
Payer: COMMERCIAL

## 2022-05-12 ENCOUNTER — OFFICE VISIT (OUTPATIENT)
Dept: FAMILY MEDICINE | Facility: CLINIC | Age: 62
End: 2022-05-12
Payer: COMMERCIAL

## 2022-05-12 VITALS
SYSTOLIC BLOOD PRESSURE: 138 MMHG | WEIGHT: 250.5 LBS | BODY MASS INDEX: 33.93 KG/M2 | DIASTOLIC BLOOD PRESSURE: 82 MMHG | HEIGHT: 72 IN | HEART RATE: 87 BPM | RESPIRATION RATE: 20 BRPM | TEMPERATURE: 97.2 F | OXYGEN SATURATION: 97 %

## 2022-05-12 DIAGNOSIS — R06.02 SOB (SHORTNESS OF BREATH): ICD-10-CM

## 2022-05-12 DIAGNOSIS — J06.9 VIRAL URI WITH COUGH: ICD-10-CM

## 2022-05-12 DIAGNOSIS — J45.20 MILD INTERMITTENT ASTHMA WITHOUT COMPLICATION: Primary | ICD-10-CM

## 2022-05-12 PROCEDURE — 99215 OFFICE O/P EST HI 40 MIN: CPT | Performed by: NURSE PRACTITIONER

## 2022-05-12 PROCEDURE — 71046 X-RAY EXAM CHEST 2 VIEWS: CPT | Mod: TC | Performed by: INTERNAL MEDICINE

## 2022-05-12 PROCEDURE — 93000 ELECTROCARDIOGRAM COMPLETE: CPT | Performed by: NURSE PRACTITIONER

## 2022-05-12 RX ORDER — MONTELUKAST SODIUM 10 MG/1
10 TABLET ORAL AT BEDTIME
Qty: 30 TABLET | Refills: 1 | Status: SHIPPED | OUTPATIENT
Start: 2022-05-12 | End: 2022-07-05

## 2022-05-12 ASSESSMENT — PAIN SCALES - GENERAL: PAINLEVEL: NO PAIN (0)

## 2022-05-12 NOTE — PROGRESS NOTES
Assessment & Plan     (J45.20) Mild intermittent asthma without complication  (primary encounter diagnosis)  Comment: Worsening over the last 4 to 5 months.  He has been needing to use his albuterol inhaler more often.  Exam reveals pale nasal mucosa which could be a sign of allergies.  He has an appointment scheduled with pulmonology in about 2 months.  We will start with a trial of Singulair.  Further eval and management can be initiated by pulmonology  Plan: montelukast (SINGULAIR) 10 MG tablet            (J06.9) Viral URI with cough  Comment: Patient had significant sore throat that has resolved.  Suspect multitude of symptoms is likely viral.  Chest x-ray, reviewed by me, is without acute findings.  We are awaiting rad read.  Steroid nasal spray would be beneficial for the swollen nasal mucosa  Plan: XR Chest 2 Views            (R06.02) SOB (shortness of breath)  Comment: Suspect shortness of breath is related to asthma.  EKG is completed without any acute findings.  Chest x-ray without acute findings as well.  I doubt shortness of breath is cardiac related considering historical features. He last saw cardiology in January 2021. Plan is pulm appt in 2 months.   Plan: EKG 12-lead complete w/read - Clinics, XR Chest        2 Views            60 minutes spent on the date of the encounter doing chart review, history and exam, documentation and further activities as noted above     RORO Carter CNP  [facility] JAS[place] Parsons is a 61 year old who presents for the following health issues     URI    History of Present Illness     Asthma:  He presents for follow up of asthma.  He has some cough, some wheezing, and some shortness of breath. He is using a relief medication daily. He does not miss any doses of his controller medication throughout the week.Patient is aware of the following triggers: cold air, exercise or sports and smoke. The patient has not had a visit to the  Emergency Room, Urgent Care or Hospital due to asthma since the last clinic visit.     Reason for visit:  Cough, difficulty breathing, headache, sore throa  Symptom onset:  More than a month  Symptom intensity:  Severe  Symptom progression:  Worsening  Had these symptoms before:  Yes  Has tried/received treatment for these symptoms:  Yes  Previous treatment was successful:  No  What makes it worse:  Exercise  What makes it better:  Rest    He eats 2-3 servings of fruits and vegetables daily.He consumes 1 sweetened beverage(s) daily.He exercises with enough effort to increase his heart rate 20 to 29 minutes per day.  He exercises with enough effort to increase his heart rate 4 days per week. He is missing 1 dose(s) of medications per week.       Most concerned about asthma getting worse for about 4-5 months   Coughing a lot more. Coughs all day   Just got another inhaler. This helps just a little    Has appt with pulm in July   Never has had allergies   Has had mild asthma on occasion over the years. Used an inhaler over a couple years   No reflux   Symptoms are better in the morning and get worse as the day goes on   Doesn't drink much coffee. Maybe 3-4 oz   Sleeps pretty well generally   Goes up the stairs will cause him to cough sometimes   Exercise tolerance is on and off. Sometimes will get lots of wheezing and coughing   Being around a fire is very triggering for him   No orthopnea or excess coughing with lying     Recently has been feeling run down  Over the weekend got a terrible sore throat   Today it is just a little scratchy   Has tested neg for covid   Has headaches   Bad tinnitus that is worse than normal   Feeling rundown   Had covid vaccine       Review of Systems   Detailed as above         Objective    /82 (BP Location: Left arm, Cuff Size: Adult Regular)   Pulse 87   Temp 97.2  F (36.2  C) (Tympanic)   Resp 20   Ht 1.829 m (6')   Wt 113.6 kg (250 lb 8 oz)   SpO2 97%   BMI 33.97 kg/m     Body mass index is 33.97 kg/m .  Physical Exam  Constitutional:       Appearance: He is well-developed.   HENT:      Head: Normocephalic.      Right Ear: Tympanic membrane, ear canal and external ear normal.      Left Ear: Tympanic membrane, ear canal and external ear normal.      Nose: Mucosal edema (significant bilaterally) present.      Mouth/Throat:      Pharynx: No oropharyngeal exudate.   Eyes:      Conjunctiva/sclera: Conjunctivae normal.   Cardiovascular:      Rate and Rhythm: Normal rate and regular rhythm.      Heart sounds: Normal heart sounds. No murmur heard.  Pulmonary:      Effort: Pulmonary effort is normal. No respiratory distress.      Breath sounds: Normal breath sounds.   Musculoskeletal:         General: Normal range of motion.      Cervical back: Normal range of motion.   Lymphadenopathy:      Cervical: No cervical adenopathy.   Skin:     General: Skin is warm and dry.   Neurological:      Mental Status: He is alert.   Psychiatric:         Mood and Affect: Mood normal.

## 2022-05-12 NOTE — PATIENT INSTRUCTIONS
I recommend Flonase or Nasacort daily until symptoms resolve. These are 2 different steroid sprays that do the same thing to decrease swelling in the nose and reduce your symptoms. They are both over the counter    Start the singulair 1 pill at night. See if this helps your asthma symptoms     If anything changes, please gets seen right away     Magnesium glycinate 200-400mg at night (oxide is ok. Citrate helps more with the bowels)    [email or web address] is a great place to buy supplements

## 2022-05-13 NOTE — TELEPHONE ENCOUNTER
See below, pt requesting contact info for acupuncture    Please advise    Alexandra MONIQUE Triage RN  [facility]

## 2022-05-27 ENCOUNTER — OFFICE VISIT (OUTPATIENT)
Dept: UROLOGY | Facility: CLINIC | Age: 62
End: 2022-05-27
Payer: COMMERCIAL

## 2022-05-27 ENCOUNTER — ALLIED HEALTH/NURSE VISIT (OUTPATIENT)
Dept: UROLOGY | Facility: CLINIC | Age: 62
End: 2022-05-27

## 2022-05-27 VITALS
OXYGEN SATURATION: 96 % | HEIGHT: 72 IN | HEART RATE: 94 BPM | DIASTOLIC BLOOD PRESSURE: 87 MMHG | BODY MASS INDEX: 33.86 KG/M2 | WEIGHT: 250 LBS | SYSTOLIC BLOOD PRESSURE: 130 MMHG

## 2022-05-27 DIAGNOSIS — R97.20 ELEVATED PROSTATE SPECIFIC ANTIGEN (PSA): Primary | ICD-10-CM

## 2022-05-27 DIAGNOSIS — Z53.9 ERRONEOUS ENCOUNTER--DISREGARD: ICD-10-CM

## 2022-05-27 PROCEDURE — 96372 THER/PROPH/DIAG INJ SC/IM: CPT | Mod: 59 | Performed by: STUDENT IN AN ORGANIZED HEALTH CARE EDUCATION/TRAINING PROGRAM

## 2022-05-27 PROCEDURE — 88341 IMHCHEM/IMCYTCHM EA ADD ANTB: CPT | Performed by: PATHOLOGY

## 2022-05-27 PROCEDURE — 2894A SURGICAL PATHOLOGY EXAM: CPT | Performed by: PATHOLOGY

## 2022-05-27 PROCEDURE — 88342 IMHCHEM/IMCYTCHM 1ST ANTB: CPT | Performed by: PATHOLOGY

## 2022-05-27 PROCEDURE — 76942 ECHO GUIDE FOR BIOPSY: CPT | Performed by: STUDENT IN AN ORGANIZED HEALTH CARE EDUCATION/TRAINING PROGRAM

## 2022-05-27 PROCEDURE — 55700 PR BIOPSY OF PROSTATE,NEEDLE/PUNCH: CPT | Performed by: STUDENT IN AN ORGANIZED HEALTH CARE EDUCATION/TRAINING PROGRAM

## 2022-05-27 RX ORDER — GENTAMICIN 40 MG/ML
80 INJECTION, SOLUTION INTRAMUSCULAR; INTRAVENOUS ONCE
Status: COMPLETED | OUTPATIENT
Start: 2022-05-27 | End: 2022-05-27

## 2022-05-27 RX ADMIN — GENTAMICIN 80 MG: 40 INJECTION, SOLUTION INTRAMUSCULAR; INTRAVENOUS at 08:12

## 2022-05-27 ASSESSMENT — PAIN SCALES - GENERAL
PAINLEVEL: NO PAIN (0)
PAINLEVEL: MODERATE PAIN (4)

## 2022-05-27 NOTE — PROGRESS NOTES
PREPROCEDURE DIAGNOSIS: Elevated PSA    POSTPROCEDURE DIAGNOSIS: Elevated PSA.     PROCEDURE: Transrectal ultrasound sizing and transrectal ultrasound guided prostatic needle biopsy for Speedy Carlson who is a 61 year old year old male.     SURGEON: Antwan Roche  ANESTHESIA: 10 mL of 1% periprostatic block bilaterally     DESCRIPTION OF PROCEDURE: The procedure, the outcome, the anesthesia, and the risks were discussed with the patient. Informed consent was obtained and signed and a timeout was completed prior to the procedure. Digital rectal examination was performed with the below findings noted. Anesthesia was administered as noted above and the transrectal ultrasound probe was inserted, sizing was performed, and the below findings were noted. 14 core biopsies were taken as described below. The probe was then withdrawn. Patient tolerated the procedure well.     FINDINGS: Digital rectal exam reveals firm  normal prostate. Total volume is 79 mL. Hypoechoic lesions noted bilaterally. US images were obtained.  We then performed site-directed sextant biopsies.  12 cores were taken with 6 on each side, base, mid and apex. 2 Additional cores were taken from the transition zone    PLAN: Will follow-up next week to review results.    Atnwan Roche MD  Urology  [facility]

## 2022-05-27 NOTE — LETTER
5/27/2022       RE: Speedy Carlson  [address] [facility] [address]     Dear Colleague,    Thank you for referring your patient, Speedy Carlson, to the [facility] at [facility]. Please see a copy of my visit note below.    PREPROCEDURE DIAGNOSIS: Elevated PSA    POSTPROCEDURE DIAGNOSIS: Elevated PSA.     PROCEDURE: Transrectal ultrasound sizing and transrectal ultrasound guided prostatic needle biopsy for Speedy Carlson who is a 61 year old year old male.     SURGEON: Antwan Roche  ANESTHESIA: 10 mL of 1% periprostatic block bilaterally     DESCRIPTION OF PROCEDURE: The procedure, the outcome, the anesthesia, and the risks were discussed with the patient. Informed consent was obtained and signed and a timeout was completed prior to the procedure. Digital rectal examination was performed with the below findings noted. Anesthesia was administered as noted above and the transrectal ultrasound probe was inserted, sizing was performed, and the below findings were noted. 14 core biopsies were taken as described below. The probe was then withdrawn. Patient tolerated the procedure well.     FINDINGS: Digital rectal exam reveals firm  normal prostate. Total volume is 79 mL. Hypoechoic lesions noted bilaterally. US images were obtained.  We then performed site-directed sextant biopsies.  12 cores were taken with 6 on each side, base, mid and apex. 2 Additional cores were taken from the transition zone    PLAN: Will follow-up next week to review results.    Antwan Roche MD  Urology  [facility]

## 2022-05-27 NOTE — NURSING NOTE
Chief Complaint   Patient presents with     Elevated PSA     Patient presents to clinic for Transrectal Ultrasound/MRI Guided Prostate Biopsies      Procedure was explained to patient prior to performing said procedure.  The patient signed the [place] consent form and all questions were answered prior to the procedure.  Any pre-procedural antibiotics were given according to the performing physician's recommendation.  Patient reviewed information on the labels attached to samples and confirmed the accuracy of all of the labels.     Performing Physician:  Dr. Roche  Antibiotic taken?  yes  Aspirin or other blood thinning medications discontinued 7-10 days:  YES  Time of enema:  6:45AM   Patient given Gentamicin intramuscular injection 30 minutes prior to procedure  Yes    The following medication was given:     MEDICATION: Gentamicin   ROUTE: IM  SITE: LUQ - Gluteus  DOSE:80mg/2ml  LOT #: [id number]  : [employer]  EXPIRATION DATE: 03/23  NDC#: 46815-808-99   Was there drug waste? No  Multi-dose vial: Yes  Using a 1 1/2 inch 21 guage needle medication drawn up as ordered with sterile syringe. Using sterile technique, a new 1 1/2 needle 21 gauge placed on syringe and patient cleansed with alcohol pad. Site was mapped out using palm of hand on the greater trochanter and forefinger on iliac crest. Using V technique between middle finger and index finger. Skin was tracted and Injection was given using a 90 degree angle dart method and after aspiration of needle and no blood, medication was slowly given via IM injection. After 10 seconds needle was removed.  Patient tolerated injection well, and bandage placed on site following insertion removal.       Daphne Vann LPN

## 2022-05-27 NOTE — PATIENT INSTRUCTIONS
[facility], [facility]  Transrectal Ultrasound  Post Operative Information    The physician who performed your Transrectal Ultrasound is Dr. Roche (telephone number [phone]).  Please contact this doctor if you have any problems or questions.  If unable to reach your doctor, please return to the Emergency Department.    Take one antibiotic the evening of the procedure and then as directed on your prescription.  Drink at least 6-8 glasses of fluids for the first 48 hours.  Avoid heavy lifting and strenuous activity for 48 hours.  Avoid sexual intercourse for the first 24 hours.  No aspirin or ibuprofen products (Motrin, Advil, Nuprin, ect.) for one week.  You may take acetaminophen (Tylenol) for pain.  You may notice a small amount of blood on the tissue after a bowel movement.  You may pass blood with clots in your urine following the procedure.  The amount will decrease with time but may be visible for up to two weeks.   You make have blood in your semen for 4 weeks after the procedure.  You may experience mild perineal (groin area) discomfort after the procedure.  Please call you doctor if you have any of the follow symptoms:  Fever  Increase in the amount of blood passed  Severe discomfort or pain

## 2022-06-01 LAB
PATH REPORT.COMMENTS IMP SPEC: NORMAL
PATH REPORT.COMMENTS IMP SPEC: NORMAL
PATH REPORT.FINAL DX SPEC: NORMAL
PATH REPORT.GROSS SPEC: NORMAL
PATH REPORT.MICROSCOPIC SPEC OTHER STN: NORMAL
PATH REPORT.MICROSCOPIC SPEC OTHER STN: NORMAL
PATH REPORT.RELEVANT HX SPEC: NORMAL
PHOTO IMAGE: NORMAL

## 2022-06-02 ENCOUNTER — VIRTUAL VISIT (OUTPATIENT)
Dept: UROLOGY | Facility: CLINIC | Age: 62
End: 2022-06-02
Payer: COMMERCIAL

## 2022-06-02 VITALS — HEIGHT: 72 IN | BODY MASS INDEX: 33.18 KG/M2 | WEIGHT: 245 LBS

## 2022-06-02 DIAGNOSIS — R97.20 ELEVATED PROSTATE SPECIFIC ANTIGEN (PSA): Primary | ICD-10-CM

## 2022-06-02 PROCEDURE — 99213 OFFICE O/P EST LOW 20 MIN: CPT | Mod: TEL | Performed by: STUDENT IN AN ORGANIZED HEALTH CARE EDUCATION/TRAINING PROGRAM

## 2022-06-02 ASSESSMENT — PAIN SCALES - GENERAL: PAINLEVEL: NO PAIN (0)

## 2022-06-02 NOTE — PROGRESS NOTES
Issa is a 61 year old who is being evaluated via a billable telephone visit.      What phone number would you like to be contacted at?   [phone]  How would you like to obtain your AVS? [place]  Phone call duration: 5 minutes        Chief Complaint:    Elevated PSA (Prostate Specific Antigen)             History of Present Illness:   Speedy Carlson is a 61 year old male being seen for prostate biopsy follow-up.  Duration of problem: Few months  Previous treatments: Prostate biopsy 1 week ago        Doing well no complaints at the moment  No significant issues after the biopsy             Past Medical History:     Past Medical History:   Diagnosis Date     Acute prostatitis 12/2004     Calculus of kidney 1994    no recurrence     Cervical radiculopathy 10/04/2016    R side     Class 1 obesity due to excess calories without serious comorbidity with body mass index (BMI) of 31.0 to 31.9 in adult 07/06/2020     Coronary artery disease due to lipid rich plaque     coronary arteryt calcium score of 22, 63rd percentile in 2016     Dry eye syndrome      Hypertension      Idiopathic urticaria      Impotence of organic origin      Insomnia      Mild intermittent asthma 1975    hx in childhood     Mixed hyperlipidemia      Obesity      HANNAH (obstructive sleep apnea)      Other acne      Prostatitis      Seborrhea capitis      Tinnitus      Varicella without mention of complication             Past Surgical History:     Past Surgical History:   Procedure Laterality Date     COLONOSCOPY  2012     COLONOSCOPY N/A 4/28/2022    Procedure: COLONOSCOPY;  Surgeon: Echo Caldera MD;  Location:  GI     DENTAL SURGERY  1980    wisdom teeth     ENDOSCOPY DRUG INDUCED SLEEP N/A 6/3/2020    Procedure: DRUG-INDUCED SLEEP ENDOSCOPY;  Surgeon: Gladys Marroquin MD;  Location: [facility]     IMPLANT GENERATOR STIMULATOR (LOCATION) Right 10/28/2020    Procedure: INSERTION, PULSE GENERATOR, NEUROSTIMULATOR;  Surgeon:  Gladys Marroquin MD;  Location: [facility] OR     PHOTOREFRACTIVE KERATECTOMY  2000    s/p Lasik surgery     TONSILLECTOMY  1971    s/p Tonsillectomy            Medications     Current Outpatient Medications   Medication     albuterol (PROAIR HFA/PROVENTIL HFA/VENTOLIN HFA) 108 (90 Base) MCG/ACT inhaler     amLODIPine (NORVASC) 5 MG tablet     ASPIRIN NOT PRESCRIBED (INTENTIONAL)     montelukast (SINGULAIR) 10 MG tablet     naltrexone-bupropion (CONTRAVE) 8-90 MG per 12 hr tablet     pravastatin (PRAVACHOL) 40 MG tablet     traZODone (DESYREL) 50 MG tablet     zolpidem (AMBIEN) 5 MG tablet     No current facility-administered medications for this visit.            Family History:     Family History   Problem Relation Age of Onset     Cancer Father         lung, smoker     Breast Cancer Mother         at age  45     Depression Mother      Obesity Mother      Eye Disorder Mother         cataracts     Hyperlipidemia Mother      Hypertension Mother      Coronary Artery Disease Mother         First MI betweeen 60 and 65 year of age, 3 MIs in past 15 years     Pulmonary Embolism Mother         on Eliquis     Coronary Artery Disease Brother         MI at age 30, pericarditis then MI     Coronary Artery Disease Brother         CABG x 4 at age 48, smoker     Hyperlipidemia Brother      Obesity Brother         Has lost over 100 pounds            Social History:     Social History     Socioeconomic History     Marital status:      Spouse name: Acosta Zuleta     Number of children: 0     Years of education: 18     Highest education level: Not on file   Occupational History     Occupation: Research Analyst     Employer: [employer]     Occupation: Consultant     Comment: self-employed   Tobacco Use     Smoking status: Never Smoker     Smokeless tobacco: Never Used     Tobacco comment: cigar occ   Substance and Sexual Activity     Alcohol use: Yes     Comment: 2-4 beers per week     Drug use: No     Sexual  activity: Yes     Partners: Female     Birth control/protection: Pill   Other Topics Concern     Not on file   Social History Narrative    Social Documentation:        Balanced Diet: YES    Calcium intake: 1-2 per day    Caffeine: 1 cups coffee per day-during week    Exercise:  type of activity walk;  3 days / week  exercycle, treadmill    Sunscreen: Yes    Seatbelts:  Yes    Self Testicular Exam: Yes    Physical/Emotional/Sexual Abuse: No     Do you feel safe in your environment? Yes        Cholesterol screen up to date: No-Discussed    Eye Exam up to date: Yes    Dental Exam up to date: Yes    Dexa Scan up to date: Does Not Apply    Colonoscopy up to date: Does Not Apply    Immunizations up to date: Yes     Glucose screen if over 40:  No- Discussed        Brittany Canada MA     Social Determinants of Health     Financial Resource Strain: Not on file   Food Insecurity: Not on file   Transportation Needs: Not on file   Physical Activity: Not on file   Stress: Not on file   Social Connections: Not on file   Intimate Partner Violence: Not on file   Housing Stability: Not on file            Allergies:   Liraglutide, Vitamin d3 [cholecalciferol], Hmg-coa-r inhibitors, and Azithromycin         Review of Systems:  From intake questionnaire     Skin: negative  Eyes: negative  Ears/Nose/Throat: negative  Respiratory: No shortness of breath, dyspnea on exertion, cough, or hemoptysis  Cardiovascular: No chest pain or palpitations  Gastrointestinal: negative; no nausea/vomiting, constipation or diarrhea  Genitourinary: as per HPI  Musculoskeletal: negative  Neurologic: negative  Psychiatric: negative  Hematologic/Lymphatic/Immunologic: negative  Endocrine: negative         Physical Exam:   This is a virtual phone visit      Review of Imaging:  The following imaging exams were independently viewed and interpreted by me and discussed with patient:      Review of Labs:  The following labs were reviewed by me and discussed with  the patient:  Prostate biopsy/surgical pathology: Normal  No evidence of malignancy    Assessment & Plan     Elevated prostate specific antigen (PSA)  Discussed about the significance of the findings of the pathology on the biopsy  No need for further intervention  Follow-up in 1 year with me with PSA  - PSA tumor marker [[id number]]; Future      Antwanbello Roche MD  [facility] [place]      ==========================    Additional Billing and Coding Information:  Review of external notes as documented above   Review of the result(s) of each unique test - Surgical pathology    Independent interpretation of a test performed by another physician/other qualified health care professional (not separately reported) -       Discussion of management or test interpretation with external physician/other qualified healthcare professional/appropriate source -       Diagnosis or treatment significantly limited by social determinants of health -         8 minutes spent on the date of the encounter doing chart review, review of test results, interpretation of tests, patient visit and documentation

## 2022-06-02 NOTE — LETTER
6/2/2022       RE: Speedy Carlson  [address] [facility] [address]     Dear Colleague,    Thank you for referring your patient, Speedy Carlson, to the [facility] [place] at [facility]. Please see a copy of my visit note below.    Issa is a 61 year old who is being evaluated via a billable telephone visit.      What phone number would you like to be contacted at?   [phone]  How would you like to obtain your AVS? MyChart  Phone call duration: 5 minutes        Chief Complaint:    Elevated PSA (Prostate Specific Antigen)             History of Present Illness:   Speedy Carlson is a 61 year old male being seen for prostate biopsy follow-up.  Duration of problem: Few months  Previous treatments: Prostate biopsy 1 week ago        Doing well no complaints at the moment  No significant issues after the biopsy             Past Medical History:     Past Medical History:   Diagnosis Date     Acute prostatitis 12/2004     Calculus of kidney 1994    no recurrence     Cervical radiculopathy 10/04/2016    R side     Class 1 obesity due to excess calories without serious comorbidity with body mass index (BMI) of 31.0 to 31.9 in adult 07/06/2020     Coronary artery disease due to lipid rich plaque     coronary arteryt calcium score of 22, 63rd percentile in 2016     Dry eye syndrome      Hypertension      Idiopathic urticaria      Impotence of organic origin      Insomnia      Mild intermittent asthma 1975    hx in childhood     Mixed hyperlipidemia      Obesity      HANNAH (obstructive sleep apnea)      Other acne      Prostatitis      Seborrhea capitis      Tinnitus      Varicella without mention of complication             Past Surgical History:     Past Surgical History:   Procedure Laterality Date     COLONOSCOPY  2012     COLONOSCOPY N/A 4/28/2022    Procedure: COLONOSCOPY;  Surgeon: Echo Caldera MD;  Location: [facility]      DENTAL SURGERY  1980    wisdom teeth     ENDOSCOPY DRUG INDUCED SLEEP N/A 6/3/2020    Procedure: DRUG-INDUCED SLEEP ENDOSCOPY;  Surgeon: Gladys Marroquin MD;  Location: [facility] OR     IMPLANT GENERATOR STIMULATOR (LOCATION) Right 10/28/2020    Procedure: INSERTION, PULSE GENERATOR, NEUROSTIMULATOR;  Surgeon: Gladys Marroquin MD;  Location: [facility] OR     PHOTOREFRACTIVE KERATECTOMY  2000    s/p Lasik surgery     TONSILLECTOMY  1971    s/p Tonsillectomy            Medications     Current Outpatient Medications   Medication     albuterol (PROAIR HFA/PROVENTIL HFA/VENTOLIN HFA) 108 (90 Base) MCG/ACT inhaler     amLODIPine (NORVASC) 5 MG tablet     ASPIRIN NOT PRESCRIBED (INTENTIONAL)     montelukast (SINGULAIR) 10 MG tablet     naltrexone-bupropion (CONTRAVE) 8-90 MG per 12 hr tablet     pravastatin (PRAVACHOL) 40 MG tablet     traZODone (DESYREL) 50 MG tablet     zolpidem (AMBIEN) 5 MG tablet     No current facility-administered medications for this visit.            Family History:     Family History   Problem Relation Age of Onset     Cancer Father         lung, smoker     Breast Cancer Mother         at age  45     Depression Mother      Obesity Mother      Eye Disorder Mother         cataracts     Hyperlipidemia Mother      Hypertension Mother      Coronary Artery Disease Mother         First MI betweeen 61 and 66 year of age, 3 MIs in past 15 years     Pulmonary Embolism Mother         on Eliquis     Coronary Artery Disease Brother         MI at age 30, pericarditis then MI     Coronary Artery Disease Brother         CABG x 4 at age 48, smoker     Hyperlipidemia Brother      Obesity Brother         Has lost over 100 pounds            Social History:     Social History     Socioeconomic History     Marital status:      Spouse name: Acosta Zuleta     Number of children: 0     Years of education: 18     Highest education level: Not on file   Occupational History     Occupation: Research Analyst      Employer: [employer]     Occupation: Consultant     Comment: self-employed   Tobacco Use     Smoking status: Never Smoker     Smokeless tobacco: Never Used     Tobacco comment: cigar occ   Substance and Sexual Activity     Alcohol use: Yes     Comment: 2-4 beers per week     Drug use: No     Sexual activity: Yes     Partners: Female     Birth control/protection: Pill   Other Topics Concern     Not on file   Social History Narrative    Social Documentation:        Balanced Diet: YES    Calcium intake: 1-2 per day    Caffeine: 1 cups coffee per day-during week    Exercise:  type of activity walk;  3 days / week  exercycle, treadmill    Sunscreen: Yes    Seatbelts:  Yes    Self Testicular Exam: Yes    Physical/Emotional/Sexual Abuse: No     Do you feel safe in your environment? Yes        Cholesterol screen up to date: No-Discussed    Eye Exam up to date: Yes    Dental Exam up to date: Yes    Dexa Scan up to date: Does Not Apply    Colonoscopy up to date: Does Not Apply    Immunizations up to date: Yes     Glucose screen if over 40:  No- Discussed        Brittany Canada MA     Social Determinants of Health     Financial Resource Strain: Not on file   Food Insecurity: Not on file   Transportation Needs: Not on file   Physical Activity: Not on file   Stress: Not on file   Social Connections: Not on file   Intimate Partner Violence: Not on file   Housing Stability: Not on file            Allergies:   Liraglutide, Vitamin d3 [cholecalciferol], Hmg-coa-r inhibitors, and Azithromycin         Review of Systems:  From intake questionnaire     Skin: negative  Eyes: negative  Ears/Nose/Throat: negative  Respiratory: No shortness of breath, dyspnea on exertion, cough, or hemoptysis  Cardiovascular: No chest pain or palpitations  Gastrointestinal: negative; no nausea/vomiting, constipation or diarrhea  Genitourinary: as per HPI  Musculoskeletal: negative  Neurologic: negative  Psychiatric:  negative  Hematologic/Lymphatic/Immunologic: negative  Endocrine: negative         Physical Exam:   This is a virtual phone visit      Review of Imaging:  The following imaging exams were independently viewed and interpreted by me and discussed with patient:      Review of Labs:  The following labs were reviewed by me and discussed with the patient:  Prostate biopsy/surgical pathology: Normal  No evidence of malignancy    Assessment & Plan     Elevated prostate specific antigen (PSA)  Discussed about the significance of the findings of the pathology on the biopsy  No need for further intervention  Follow-up in 1 year with me with PSA  - PSA tumor marker [[id number]]; Future      Antwanbello Roche MD  [facility] [place]      ==========================    Additional Billing and Coding Information:  Review of external notes as documented above   Review of the result(s) of each unique test - Surgical pathology    Independent interpretation of a test performed by another physician/other qualified health care professional (not separately reported) -       Discussion of management or test interpretation with external physician/other qualified healthcare professional/appropriate source -       Diagnosis or treatment significantly limited by social determinants of health -         8 minutes spent on the date of the encounter doing chart review, review of test results, interpretation of tests, patient visit and documentation

## 2022-06-11 ENCOUNTER — OFFICE VISIT (OUTPATIENT)
Dept: URGENT CARE | Facility: URGENT CARE | Age: 62
End: 2022-06-11
Payer: COMMERCIAL

## 2022-06-11 ENCOUNTER — E-VISIT (OUTPATIENT)
Dept: URGENT CARE | Facility: CLINIC | Age: 62
End: 2022-06-11
Payer: COMMERCIAL

## 2022-06-11 VITALS
TEMPERATURE: 97.6 F | OXYGEN SATURATION: 98 % | HEART RATE: 86 BPM | SYSTOLIC BLOOD PRESSURE: 144 MMHG | DIASTOLIC BLOOD PRESSURE: 89 MMHG

## 2022-06-11 DIAGNOSIS — R21 RASH AND NONSPECIFIC SKIN ERUPTION: Primary | ICD-10-CM

## 2022-06-11 DIAGNOSIS — S80.862A INSECT BITE OF LEFT LOWER LEG, INITIAL ENCOUNTER: Primary | ICD-10-CM
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
## 2022-06-11 PROCEDURE — 99207 PR NON-BILLABLE SERV PER CHARTING: CPT | Performed by: FAMILY MEDICINE

## 2022-06-11 PROCEDURE — 99213 OFFICE O/P EST LOW 20 MIN: CPT | Performed by: PHYSICIAN ASSISTANT

## 2022-06-11 RX ORDER — DOXYCYCLINE 100 MG/1
100 CAPSULE ORAL 2 TIMES DAILY
Qty: 28 CAPSULE | Refills: 0 | Status: SHIPPED | OUTPATIENT
Start: 2022-06-11 | End: 2022-06-25

## 2022-06-11 NOTE — PATIENT INSTRUCTIONS
(S80.862A,  W57.XXXA) Insect bite of left lower leg, initial encounter  (primary encounter diagnosis)  Comment: most consistent with spider bite  Plan: doxycycline hyclate (VIBRAMYCIN) 100 MG capsule          As you are travelling out of country shortly, I am prescribing this medication for you to take SHOULD you develop joint pain, fever, headache or lethargy (signs of lyme disease)

## 2022-06-11 NOTE — PATIENT INSTRUCTIONS
Dear Speedy Carlson,    We are sorry you are not feeling well. Based on the responses you provided, it is recommended that you be seen in-person in urgent care so we can better evaluate your symptoms. Please click here to find the nearest urgent care location to you.   You will not be charged for this Visit. Thank you for trusting us with your care.    Velia Reynolds MD

## 2022-06-11 NOTE — PROGRESS NOTES
Patient presents with:  Urgent Care: Tick bite on right leg sore and bruise, which started few days ago.        Differential Diagnosis includes but is not limited to:  Cellulitis, tick bite, lyme disease, other insect bite with local reaction    (S80.862A,  W57.XXXA) Insect bite of left lower leg, initial encounter  (primary encounter diagnosis)  Comment: most consistent with spider bite  Plan: doxycycline hyclate (VIBRAMYCIN) 100 MG capsule          As you are travelling out of country shortly, I am prescribing this medication for you to take SHOULD you develop joint pain, fever, headache or lethargy (signs of lyme disease)      If not improving or if condition worsens, follow up with your Primary Care Provider            SUBJECTIVE:   Speedy Carlson is a 61 year old male who presents today with a small bruise/area of redness on his anterior right lower leg, onset a few days ago.  He denies any fevers, joint pain, headaches or nausea.  He is not sure that it was a tick bite, but because of the bullseye look of the lesion, he wanted to have it checked out before leaving the country.          Past Medical History:   Diagnosis Date     Acute prostatitis 12/2004     Calculus of kidney 1994    no recurrence     Cervical radiculopathy 10/04/2016    R side     Class 1 obesity due to excess calories without serious comorbidity with body mass index (BMI) of 31.0 to 31.9 in adult 07/06/2020     Coronary artery disease due to lipid rich plaque     coronary arteryt calcium score of 22, 63rd percentile in 2016     Dry eye syndrome      Hypertension      Idiopathic urticaria      Impotence of organic origin      Insomnia      Mild intermittent asthma 1975    hx in childhood     Mixed hyperlipidemia      Obesity      HANNAH (obstructive sleep apnea)      Other acne      Prostatitis      Seborrhea capitis      Tinnitus      Varicella without mention of complication          Current Outpatient Medications   Medication Sig Dispense  Refill     Multiple Vitamins-Iron (DAILY-THERESA/IRON/BETA-CAROTENE) TABS TAKE 1 TABLET BY MOUTH DAILY. (Patient not taking: Reported on 10/19/2020) 30 tablet 7     Social History     Tobacco Use     Smoking status: Never Smoker     Smokeless tobacco: Never Used   Substance Use Topics     Alcohol use: Not on file     Family History   Problem Relation Age of Onset     Diabetes Mother      Diabetes Father          ROS:    10 point ROS of systems including Constitutional, Eyes, Respiratory, Cardiovascular, Gastroenterology, Genitourinary, Integumentary, Muscularskeletal, Psychiatric ,neurological were all negative except for pertinent positives noted in my HPI       OBJECTIVE:  BP (!) 144/89 (BP Location: Right arm, Patient Position: Sitting, Cuff Size: Adult Large)   Pulse 86   Temp 97.6  F (36.4  C) (Tympanic)   SpO2 98%   Physical Exam:  GENERAL APPEARANCE: healthy, alert and no distress  Extremities: no peripheral edema or tenderness, peripheral pulses normal  NEURO: Normal strength and tone, sensory exam grossly normal,  normal speech and mentation  SKIN: ecchymoses on right lower anterior leg with central clearing and small scabbed lesion.

## 2022-07-01 DIAGNOSIS — J45.20 MILD INTERMITTENT ASTHMA WITHOUT COMPLICATION: ICD-10-CM

## 2022-07-05 RX ORDER — MONTELUKAST SODIUM 10 MG/1
TABLET ORAL
Qty: 30 TABLET | Refills: 1 | Status: SHIPPED | OUTPATIENT
Start: 2022-07-05 | End: 2022-09-09

## 2022-07-05 NOTE — TELEPHONE ENCOUNTER
Routing refill request to provider for review/approval because:  No ACT on file   Eveline Wilkes RN

## 2022-07-06 DIAGNOSIS — J45.20 MILD INTERMITTENT ASTHMA WITHOUT COMPLICATION: ICD-10-CM

## 2022-07-08 RX ORDER — MONTELUKAST SODIUM 10 MG/1
TABLET ORAL
Qty: 30 TABLET | Refills: 1 | OUTPATIENT
Start: 2022-07-08

## 2022-07-08 NOTE — TELEPHONE ENCOUNTER
This is a duplicate refill request, that has been refused to the pharmacy.   Goldy Vallejo RN  [facility] Triage Nurse

## 2022-07-13 DIAGNOSIS — E66.3 OVERWEIGHT (BMI 25.0-29.9): ICD-10-CM

## 2022-07-15 RX ORDER — NALTREXONE HYDROCHLORIDE AND BUPROPION HYDROCHLORIDE 8; 90 MG/1; MG/1
TABLET, EXTENDED RELEASE ORAL
Qty: 120 TABLET | Refills: 5 | OUTPATIENT
Start: 2022-07-15

## 2022-07-15 NOTE — TELEPHONE ENCOUNTER
CONTRAVE ER 8-90MG TABLETS     Take 2 tablets by mouth 2 times daily   Last Written Prescription Date:  5/5/22  Last Fill Quantity: 120,   # refills: 5  Sent to :[id number] - [place] [id number] [facility] [address]  Last Office Visit : 5/5/22  Future Office visit:  none   Denied/ refills on file

## 2022-09-07 DIAGNOSIS — J45.20 MILD INTERMITTENT ASTHMA WITHOUT COMPLICATION: ICD-10-CM

## 2022-09-09 RX ORDER — MONTELUKAST SODIUM 10 MG/1
TABLET ORAL
Qty: 30 TABLET | Refills: 1 | Status: SHIPPED | OUTPATIENT
Start: 2022-09-09 | End: 2022-11-14

## 2022-09-09 NOTE — TELEPHONE ENCOUNTER
Routing refill request to provider for review/approval because:      Leukotriene Inhibitors Protocol Failed    Protocol Details  Asthma control assessment score within normal limits in last 6 months     Future Appointments 9/9/2022 - 3/8/2023      Date Visit Type Length Department Provider     9/14/2022  7:30 AM PULMONARY FUNCTION TEST 60 min CS PULMONOLOGY  PULMONARY FUNCTION    Location Instructions:     Your appointment is at [facility] located at [address] [place] [id number]. Please check in at the  in [address].              9/14/2022  8:30 AM NEW 30 min  PULMONOLOGY Xavier Umanzor MD    Location Instructions:     Your appointment is at [facility] located at [address] [place] [address] [address], [place] [address]. Please check in at the  in [address].

## 2022-09-12 ENCOUNTER — E-VISIT (OUTPATIENT)
Dept: URGENT CARE | Facility: CLINIC | Age: 62
End: 2022-09-12
Payer: COMMERCIAL

## 2022-09-12 DIAGNOSIS — R19.7 DIARRHEA, UNSPECIFIED TYPE: Primary | ICD-10-CM

## 2022-09-12 PROCEDURE — 99207 PR NON-BILLABLE SERV PER CHARTING: CPT | Performed by: NURSE PRACTITIONER

## 2022-09-12 NOTE — PATIENT INSTRUCTIONS
Dear Speedy Carlson,    We are sorry you are not feeling well. Based on the responses you provided, it is recommended that you be seen in-person in urgent care so we can better evaluate your symptoms. Please click here to find the nearest urgent care location to you.   You will not be charged for this Visit. Thank you for trusting us with your care.    RORO Urban CNP      Diarrhea with Uncertain Cause (Adult)    Diarrhea is when stools are loose and watery. This can be caused by:    Viral infections    Bacterial infections    Food poisoning    Parasites    Irritable bowel syndrome (IBS)    Inflammatory bowel diseases such as ulcerative colitis, Crohn's disease, and celiac disease    Food intolerance, such as to lactose, the sugar found in milk and milk products    Reaction to medicines like antibiotics, laxatives, cancer drugs, and antacids  Along with diarrhea, you may also have:    Abdominal pain and cramping    Nausea and vomiting    Loss of bowel control    Fever and chills    Bloody stools  In some cases, antibiotics may help to treat diarrhea. You may have a stool sample test. This is done to see what is causing your diarrhea, and if antibiotics will help treat it. The results of a stool sample test may take up to 2 days. The healthcare provider may not give you antibiotics until he or she has the stool test results.  Diarrhea can cause dehydration. This is the loss of too much water and other fluids from the body. When this occurs, body fluid must be replaced. This can be done with oral rehydration solutions. Oral rehydration solutions are available at drugstores and grocery stores without a prescription. Sports drinks are not the best choice if you are very dehydrated. They have too much sugar and not enough electrolytes.  Home care  Follow all instructions given by your healthcare provider. Rest at home for the next 24 hours, or until you feel better. Avoid caffeine, tobacco, and alcohol.  These can make diarrhea, cramping, and pain worse.  If taking medicines:    Over-the-counter nausea and diarrhea medicines are generally OK unless you experience fever or blood stool. Check with your doctor first in those circumstances.    You may use acetaminophen or NSAID medicines like ibuprofen or naproxen to reduce pain and fever. Don t use these if you have chronic liver or kidney disease, or ever had a stomach ulcer or gastrointestinal bleeding. Don't use NSAID medicines if you are already taking one for another condition (like arthritis) or are on daily aspirin therapy (such as for heart disease or after a stroke). Talk with your healthcare provider first.    If antibiotics were prescribed, be sure you take them until they are finished. Don t stop taking them even when you feel better. Antibiotics must be taken as a full course.  To prevent the spread of illness:    Remember that washing with soap and water and using alcohol-based  is the best way to prevent the spread of infection. Dry your hands with a single use towel (like a paper towel).    Clean the toilet after each use.    Wash your hands before eating.    Wash your hands before and after preparing food. Keep in mind that people with diarrhea or vomiting should not prepare food for others.    Wash your hands after using cutting boards, countertops, and knives that have been in contact with raw foods.    Wash and then peel fruits and vegetables.    Keep uncooked meats away from cooked and ready-to-eat foods.    Use a food thermometer when cooking. Cook poultry to at least 165 F (74 C). Cook ground meat (beef, veal, pork, lamb) to at least 160 F (71 C). Cook fresh beef, veal, lamb, and pork to at least 145 F (63 C).    Don t eat raw or undercooked eggs (poached or debby side up), poultry, meat, or unpasteurized milk and juices.  Food and drinks  The main goal while treating vomiting or diarrhea is to prevent dehydration. This is done by taking  small amounts of liquids often.    Keep in mind that liquids are more important than food right now.    Drink only small amounts of liquids at a time.    Don t force yourself to eat, especially if you are having cramping, vomiting, or diarrhea. Don t eat large amounts at a time, even if you are hungry.    If you eat, avoid fatty, greasy, spicy, or fried foods.    Don t eat dairy foods or drink milk if you have diarrhea. These can make diarrhea worse.  During the first 24 hours you can try:    Oral rehydration solutions.  Sports drinks may be used if you are not too dehydrated and are otherwise healthy.    Soft drinks without caffeine    Ginger ale    Water (plain or flavored)    Decaf tea or coffee    Clear broth, consommé, or bouillon    Gelatin, popsicles, or frozen fruit juice bars  The second 24 hours, if you are feeling better, you can add:    Hot cereal, plain toast, bread, rolls, or crackers    Plain noodles, rice, mashed potatoes, chicken noodle soup, or rice soup    Unsweetened canned fruit (no pineapple)    Bananas  As you recover:    Limit fat intake to less than 15 grams per day. Don t eat margarine, butter, oils, mayonnaise, sauces, gravies, fried foods, peanut butter, meat, poultry, or fish.    Limit fiber. Don t eat raw or cooked vegetables, fresh fruits except bananas, or bran cereals.    Limit caffeine and chocolate.    Limit dairy.    Don t use spices or seasonings except salt.    Go back to your normal diet over time, as you feel better and your symptoms improve.    If the symptoms come back, go back to a simple diet or clear liquids.  Follow-up care  Follow up with your healthcare provider, or as advised. If a stool sample was taken or cultures were done, call the healthcare provider for the results as instructed.  Call 911  Call 911 if you have any of these symptoms:    Trouble breathing    Confusion    Extreme drowsiness or trouble walking    Loss of consciousness    Rapid heart rate    Chest  pain    Stiff neck    Seizure  When to seek medical advice  Call your healthcare provider right away if any of these occur:    Abdominal pain that gets worse    Constant lower right abdominal pain    Continued vomiting and inability to keep liquids down    Diarrhea more than 5 times a day    Blood in vomit or stool    Dark urine or no urine for 8 hours, dry mouth and tongue, tiredness, weakness, or dizziness    Drowsiness    New rash    You don t get better in 2 to 3 days    Fever of 100.4 F (38 C) or higher, or as directed by your healthcare provider  [employer] last reviewed this educational content on 6/1/2018 2000-2021 [employer]. All rights reserved. This information is not intended as a substitute for professional medical care. Always follow your healthcare professional's instructions.

## 2022-09-13 NOTE — PROGRESS NOTES
Pulmonary Clinic Note    Date of Service: 9/14/2022    Chief Complaint   Patient presents with     New Patient     Asthma       A/P:  61M being seen for asthma. Symptoms consistent with mild/moderate persistent asthma. PFTs normal. CXR normal.     - start ICS-LABA (Advair), advised to rinse his mouth out after use  - continue montelukast  - continue prn albuterol    RTC 4mo    History:  61M HTN, HLD, CAD, HANNAH being seen for asthma. He is prescribed albuterol and montelukast. First told he has asthma as a teen. Has been getting worse the last 10 years. Was doing poorly a few months ago, coughing a lot, started on montelukast which vastly improved his symptoms. Typically feels TEIXEIRA w/ stairs or heavy lifting, but at times notices SOB at rest. Had been coughing frequently, much improved, still some daily cough, dry. Cough wakes from sleep 3-4x/week. Occasional SOB at night, which he thinks may be his HANNAH. Using albuterol ~2-3x/week, helps a moderate amount. At times hears wheezing. Harder to get air in. At times feels throat tightness. No hoarseness. No seasonal allergies.     Smoking: never       Recent travel: [place] recently                        Bird exposure: no             Animal exposure: 2 dogs        Inhalation exposure: no    Occupation: works for an [employer] firm                          10 point review of systems negative, aside from that mentioned in HPI.    BP (!) 145/90 (BP Location: Left arm, Patient Position: Sitting, Cuff Size: Adult Large)   Pulse 95   Wt 112.7 kg (248 lb 8 oz)   SpO2 97%   BMI 33.70 kg/m    Gen: well-appearing  HEENT: Mallampati IV  Card: RRR  Pulm: clear bilaterally   Abd: soft  MSK: no edema, no acute joint abnormality   Skin: no obvious rash  Psych: normal affect  Neuro: alert and oriented     Labs:  Personally reviewed  Abs eos (5/2021) - 300    Imaging/Studies: Personally reviewed  PFTs (9/2022) - normal pulmonary function  CXR (5/2022) - no acute infiltrates    Past  Medical History:   Diagnosis Date     Acute prostatitis 12/2004     Calculus of kidney 1994    no recurrence     Cervical radiculopathy 10/04/2016    R side     Class 1 obesity due to excess calories without serious comorbidity with body mass index (BMI) of 31.0 to 31.9 in adult 07/06/2020     Coronary artery disease due to lipid rich plaque     coronary arteryt calcium score of 22, 63rd percentile in 2016     Dry eye syndrome      Hypertension      Idiopathic urticaria      Impotence of organic origin      Insomnia      Mild intermittent asthma 1975    hx in childhood     Mixed hyperlipidemia      Obesity      HANNAH (obstructive sleep apnea)      Other acne      Prostatitis      Seborrhea capitis      Tinnitus      Varicella without mention of complication      Past Surgical History:   Procedure Laterality Date     COLONOSCOPY  2012     COLONOSCOPY N/A 4/28/2022    Procedure: COLONOSCOPY;  Surgeon: Echo Caldera MD;  Location:  GI     DENTAL SURGERY  1980    wisdom teeth     ENDOSCOPY DRUG INDUCED SLEEP N/A 6/3/2020    Procedure: DRUG-INDUCED SLEEP ENDOSCOPY;  Surgeon: Gladys Marroquin MD;  Location:  OR     IMPLANT GENERATOR STIMULATOR (LOCATION) Right 10/28/2020    Procedure: INSERTION, PULSE GENERATOR, NEUROSTIMULATOR;  Surgeon: Gladys Marroquin MD;  Location: [facility] [place] OR     PHOTOREFRACTIVE KERATECTOMY  2000    s/p Lasik surgery     TONSILLECTOMY  1971    s/p Tonsillectomy     Family History   Problem Relation Age of Onset     Cancer Father         lung, smoker     Breast Cancer Mother         at age  45     Depression Mother      Obesity Mother      Eye Disorder Mother         cataracts     Hyperlipidemia Mother      Hypertension Mother      Coronary Artery Disease Mother         First MI betweeen 61 and 66 year of age, 3 MIs in past 15 years     Pulmonary Embolism Mother         on Eliquis     Coronary Artery Disease Brother         MI at age 30, pericarditis then MI      Coronary Artery Disease Brother         CABG x 4 at age 48, smoker     Hyperlipidemia Brother      Obesity Brother         Has lost over 100 pounds     Social History     Socioeconomic History     Marital status:      Spouse name: Acosta Zuleta     Number of children: 0     Years of education: 18     Highest education level: Not on file   Occupational History     Occupation: Research Analyst     Employer: [employer]     Occupation: Consultant     Comment: self-employed   Tobacco Use     Smoking status: Never Smoker     Smokeless tobacco: Never Used     Tobacco comment: cigar occ   Substance and Sexual Activity     Alcohol use: Yes     Comment: 2-4 beers per week     Drug use: No     Sexual activity: Yes     Partners: Female     Birth control/protection: Pill   Other Topics Concern     Not on file   Social History Narrative    Social Documentation:        Balanced Diet: YES    Calcium intake: 1-2 per day    Caffeine: 1 cups coffee per day-during week    Exercise:  type of activity walk;  3 days / week  exercycle, treadmill    Sunscreen: Yes    Seatbelts:  Yes    Self Testicular Exam: Yes    Physical/Emotional/Sexual Abuse: No     Do you feel safe in your environment? Yes        Cholesterol screen up to date: No-Discussed    Eye Exam up to date: Yes    Dental Exam up to date: Yes    Dexa Scan up to date: Does Not Apply    Colonoscopy up to date: Does Not Apply    Immunizations up to date: Yes     Glucose screen if over 40:  No- Discussed        Brittany Canada MA     Social Determinants of Health     Financial Resource Strain: Not on file   Food Insecurity: Not on file   Transportation Needs: Not on file   Physical Activity: Not on file   Stress: Not on file   Social Connections: Not on file   Intimate Partner Violence: Not on file   Housing Stability: Not on file       50 minutes spent reviewing chart, reviewing test results, talking with and examining patient, formulating plan, and documentation  on the day of the encounter.    Xavier Umanzor MD  Pulmonary and Critical Care Medicine  [facility]

## 2022-09-14 ENCOUNTER — OFFICE VISIT (OUTPATIENT)
Dept: PULMONOLOGY | Facility: CLINIC | Age: 62
End: 2022-09-14

## 2022-09-14 ENCOUNTER — ALLIED HEALTH/NURSE VISIT (OUTPATIENT)
Dept: PULMONOLOGY | Facility: CLINIC | Age: 62
End: 2022-09-14
Payer: COMMERCIAL

## 2022-09-14 VITALS
OXYGEN SATURATION: 97 % | SYSTOLIC BLOOD PRESSURE: 145 MMHG | DIASTOLIC BLOOD PRESSURE: 90 MMHG | BODY MASS INDEX: 33.7 KG/M2 | WEIGHT: 248.5 LBS | HEART RATE: 95 BPM

## 2022-09-14 DIAGNOSIS — J45.20 MILD INTERMITTENT ASTHMA WITHOUT COMPLICATION: ICD-10-CM

## 2022-09-14 DIAGNOSIS — J45.30 MILD PERSISTENT ASTHMA WITHOUT COMPLICATION: Primary | ICD-10-CM

## 2022-09-14 PROCEDURE — 99204 OFFICE O/P NEW MOD 45 MIN: CPT | Mod: 25 | Performed by: STUDENT IN AN ORGANIZED HEALTH CARE EDUCATION/TRAINING PROGRAM

## 2022-09-14 PROCEDURE — 94375 RESPIRATORY FLOW VOLUME LOOP: CPT | Performed by: INTERNAL MEDICINE

## 2022-09-14 PROCEDURE — 94727 GAS DIL/WSHOT DETER LNG VOL: CPT | Performed by: INTERNAL MEDICINE

## 2022-09-14 PROCEDURE — 94729 DIFFUSING CAPACITY: CPT | Performed by: INTERNAL MEDICINE

## 2022-09-14 RX ORDER — FLUTICASONE PROPIONATE AND SALMETEROL XINAFOATE 115; 21 UG/1; UG/1
2 AEROSOL, METERED RESPIRATORY (INHALATION) 2 TIMES DAILY
Qty: 12 G | Refills: 3 | Status: SHIPPED | OUTPATIENT
Start: 2022-09-14 | End: 2023-02-21

## 2022-09-14 NOTE — LETTER
9/14/2022         RE: Speedy Carlson  [address] [facility] [place] [id number]        Dear Colleague,    Thank you for referring your patient, Speedy Carlson, to the [facility] [place]. Please see a copy of my visit note below.    Pulmonary Clinic Note    Date of Service: 9/14/2022    Chief Complaint   Patient presents with     New Patient     Asthma       A/P:  61M being seen for asthma. Symptoms consistent with mild/moderate persistent asthma. PFTs normal. CXR normal.     - start ICS-LABA (Advair), advised to rinse his mouth out after use  - continue montelukast  - continue prn albuterol    RTC 4mo    History:  61M HTN, HLD, CAD, HANNAH being seen for asthma. He is prescribed albuterol and montelukast. First told he has asthma as a teen. Has been getting worse the last 10 years. Was doing poorly a few months ago, coughing a lot, started on montelukast which vastly improved his symptoms. Typically feels TEIXEIRA w/ stairs or heavy lifting, but at times notices SOB at rest. Had been coughing frequently, much improved, still some daily cough, dry. Cough wakes from sleep 3-4x/week. Occasional SOB at night, which he thinks may be his HANNAH. Using albuterol ~2-3x/week, helps a moderate amount. At times hears wheezing. Harder to get air in. At times feels throat tightness. No hoarseness. No seasonal allergies.     Smoking: never       Recent travel: [place] recently                        Bird exposure: no             Animal exposure: 2 dogs        Inhalation exposure: no    Occupation: works for an [employer] firm                          10 point review of systems negative, aside from that mentioned in HPI.    BP (!) 145/90 (BP Location: Left arm, Patient Position: Sitting, Cuff Size: Adult Large)   Pulse 95   Wt 112.7 kg (248 lb 8 oz)   SpO2 97%   BMI 33.70 kg/m    Gen: well-appearing  HEENT: Mallampati IV  Card: RRR  Pulm: clear bilaterally   Abd: soft  MSK: no edema, no acute joint  abnormality   Skin: no obvious rash  Psych: normal affect  Neuro: alert and oriented     Labs:  Personally reviewed  Abs eos (5/2021) - 300    Imaging/Studies: Personally reviewed  PFTs (9/2022) - normal pulmonary function  CXR (5/2022) - no acute infiltrates    Past Medical History:   Diagnosis Date     Acute prostatitis 12/2004     Calculus of kidney 1994    no recurrence     Cervical radiculopathy 10/04/2016    R side     Class 1 obesity due to excess calories without serious comorbidity with body mass index (BMI) of 31.0 to 31.9 in adult 07/06/2020     Coronary artery disease due to lipid rich plaque     coronary arteryt calcium score of 22, 63rd percentile in 2016     Dry eye syndrome      Hypertension      Idiopathic urticaria      Impotence of organic origin      Insomnia      Mild intermittent asthma 1975    hx in childhood     Mixed hyperlipidemia      Obesity      HANNAH (obstructive sleep apnea)      Other acne      Prostatitis      Seborrhea capitis      Tinnitus      Varicella without mention of complication      Past Surgical History:   Procedure Laterality Date     COLONOSCOPY  2012     COLONOSCOPY N/A 4/28/2022    Procedure: COLONOSCOPY;  Surgeon: Echo Caldera MD;  Location:  GI     DENTAL SURGERY  1980    wisdom teeth     ENDOSCOPY DRUG INDUCED SLEEP N/A 6/3/2020    Procedure: DRUG-INDUCED SLEEP ENDOSCOPY;  Surgeon: Gladys Marroquin MD;  Location:  OR     IMPLANT GENERATOR STIMULATOR (LOCATION) Right 10/28/2020    Procedure: INSERTION, PULSE GENERATOR, NEUROSTIMULATOR;  Surgeon: Gladys Marroquin MD;  Location: [facility] [place]     PHOTOREFRACTIVE KERATECTOMY  2000    s/p Lasik surgery     TONSILLECTOMY  1971    s/p Tonsillectomy     Family History   Problem Relation Age of Onset     Cancer Father         lung, smoker     Breast Cancer Mother         at age  45     Depression Mother      Obesity Mother      Eye Disorder Mother         cataracts     Hyperlipidemia Mother       Hypertension Mother      Coronary Artery Disease Mother         First MI betweeen 61 and 66 year of age, 3 MIs in past 15 years     Pulmonary Embolism Mother         on Eliquis     Coronary Artery Disease Brother         MI at age 30, pericarditis then MI     Coronary Artery Disease Brother         CABG x 4 at age 48, smoker     Hyperlipidemia Brother      Obesity Brother         Has lost over 100 pounds     Social History     Socioeconomic History     Marital status:      Spouse name: Acosta Zuleta     Number of children: 0     Years of education: 18     Highest education level: Not on file   Occupational History     Occupation: Research Analyst     Employer: [employer]     Occupation: Consultant     Comment: self-employed   Tobacco Use     Smoking status: Never Smoker     Smokeless tobacco: Never Used     Tobacco comment: cigar occ   Substance and Sexual Activity     Alcohol use: Yes     Comment: 2-4 beers per week     Drug use: No     Sexual activity: Yes     Partners: Female     Birth control/protection: Pill   Other Topics Concern     Not on file   Social History Narrative    Social Documentation:        Balanced Diet: YES    Calcium intake: 1-2 per day    Caffeine: 1 cups coffee per day-during week    Exercise:  type of activity walk;  3 days / week  exercycle, treadmill    Sunscreen: Yes    Seatbelts:  Yes    Self Testicular Exam: Yes    Physical/Emotional/Sexual Abuse: No     Do you feel safe in your environment? Yes        Cholesterol screen up to date: No-Discussed    Eye Exam up to date: Yes    Dental Exam up to date: Yes    Dexa Scan up to date: Does Not Apply    Colonoscopy up to date: Does Not Apply    Immunizations up to date: Yes     Glucose screen if over 40:  No- Discussed        Brittany Canada MA     Social Determinants of Health     Financial Resource Strain: Not on file   Food Insecurity: Not on file   Transportation Needs: Not on file   Physical Activity: Not on file    Stress: Not on file   Social Connections: Not on file   Intimate Partner Violence: Not on file   Housing Stability: Not on file       50 minutes spent reviewing chart, reviewing test results, talking with and examining patient, formulating plan, and documentation on the day of the encounter.    Xavier Umanzor MD  Pulmonary and Critical Care Medicine  [facility]

## 2022-09-14 NOTE — PATIENT INSTRUCTIONS
Thank you for coming to pulmonary clinic. Your pulmonary function tests are normal. Your chest imaging is normal. Your symptoms are consistent with asthma. I would like you to step-up asthma therapy to Advair twice daily, rinse your mouth out after use. Continue Singulair and as needed albuterol. I will see you back in 4 months.

## 2022-09-15 LAB
DLCOUNC-%PRED-PRE: 104 %
DLCOUNC-PRE: 30.73 ML/MIN/MMHG
DLCOUNC-PRED: 29.36 ML/MIN/MMHG
ERV-%PRED-PRE: 76 %
ERV-PRE: 0.78 L
ERV-PRED: 1.02 L
EXPTIME-PRE: 3.42 SEC
FEF2575-%PRED-PRE: 119 %
FEF2575-PRE: 3.75 L/SEC
FEF2575-PRED: 3.14 L/SEC
FEFMAX-%PRED-PRE: 102 %
FEFMAX-PRE: 9.96 L/SEC
FEFMAX-PRED: 9.68 L/SEC
FEV1-%PRED-PRE: 106 %
FEV1-PRE: 4.07 L
FEV1FEV6-PRE: 80 %
FEV1FEV6-PRED: 79 %
FEV1FVC-PRE: 79 %
FEV1FVC-PRED: 77 %
FEV1SVC-PRE: 74 %
FEV1SVC-PRED: 72 %
FIFMAX-PRE: 5.39 L/SEC
FRCPLETH-%PRED-PRE: 95 %
FRCPLETH-PRE: 3.56 L
FRCPLETH-PRED: 3.73 L
FVC-%PRED-PRE: 103 %
FVC-PRE: 5.14 L
FVC-PRED: 4.97 L
IC-%PRED-PRE: 109 %
IC-PRE: 4.72 L
IC-PRED: 4.32 L
RVPLETH-%PRED-PRE: 111 %
RVPLETH-PRE: 2.77 L
RVPLETH-PRED: 2.49 L
TLCPLETH-%PRED-PRE: 109 %
TLCPLETH-PRE: 8.28 L
TLCPLETH-PRED: 7.53 L
VA-%PRED-PRE: 106 %
VA-PRE: 7.48 L
VC-%PRED-PRE: 103 %
VC-PRE: 5.51 L
VC-PRED: 5.34 L

## 2022-10-22 ENCOUNTER — HEALTH MAINTENANCE LETTER (OUTPATIENT)
Age: 62
End: 2022-10-22

## 2022-11-10 DIAGNOSIS — J45.20 MILD INTERMITTENT ASTHMA WITHOUT COMPLICATION: ICD-10-CM

## 2022-11-14 RX ORDER — MONTELUKAST SODIUM 10 MG/1
TABLET ORAL
Qty: 30 TABLET | Refills: 1 | Status: SHIPPED | OUTPATIENT
Start: 2022-11-14 | End: 2024-01-30

## 2022-11-14 NOTE — TELEPHONE ENCOUNTER
Routing refill request to provider for review/approval because:  Patient needs to be seen because:  has not been seen over 6 months  ACT: No flowsheet data found.  Callie SUTTON RN  [facility]

## 2022-12-09 ENCOUNTER — HOSPITAL ENCOUNTER (OUTPATIENT)
Dept: ULTRASOUND IMAGING | Facility: CLINIC | Age: 62
Discharge: HOME | End: 2022-12-09
Attending: FAMILY MEDICINE
Payer: COMMERCIAL

## 2022-12-09 ENCOUNTER — HOSPITAL ENCOUNTER (OUTPATIENT)
Dept: CT IMAGING | Facility: CLINIC | Age: 62
Discharge: HOME | End: 2022-12-09
Payer: COMMERCIAL

## 2022-12-09 DIAGNOSIS — E78.00 HIGH CHOLESTEROL: ICD-10-CM

## 2022-12-09 DIAGNOSIS — E66.3 OVERWEIGHT: ICD-10-CM

## 2022-12-09 DIAGNOSIS — Z91.89 SEDENTARY LIFESTYLE: ICD-10-CM

## 2022-12-09 DIAGNOSIS — Z82.49 FAMILY HISTORY OF CORONARY ARTERY DISEASE: ICD-10-CM

## 2022-12-09 DIAGNOSIS — R59.0 CERVICAL LYMPHADENOPATHY: ICD-10-CM

## 2022-12-09 DIAGNOSIS — R03.0 ELEVATED BLOOD PRESSURE READING: ICD-10-CM

## 2022-12-09 PROCEDURE — 75571 CT HRT W/O DYE W/CA TEST: CPT | Mod: 26 | Performed by: INTERNAL MEDICINE

## 2022-12-09 PROCEDURE — 75571 CT HRT W/O DYE W/CA TEST: CPT

## 2022-12-09 PROCEDURE — 76536 US EXAM OF HEAD AND NECK: CPT | Mod: 26 | Performed by: RADIOLOGY

## 2022-12-09 PROCEDURE — 76536 US EXAM OF HEAD AND NECK: CPT

## 2023-01-20 ENCOUNTER — TELEPHONE (OUTPATIENT)
Dept: PULMONOLOGY | Facility: CLINIC | Age: 63
End: 2023-01-20

## 2023-01-20 NOTE — TELEPHONE ENCOUNTER
LVM about rescheduling with Dr. Umanzor on 02/06 at 2:30pm. Will try again to check it is okay.  If not he will call back at [phone]

## 2023-01-20 NOTE — TELEPHONE ENCOUNTER
NATHANIELM saying I scheduled him for 02/06 at 2:30pm and if it does not work call us at [phone]

## 2023-02-05 NOTE — PROGRESS NOTES
Pulmonary Clinic Note    Date of Service: 2/6/2023    Chief Complaint   Patient presents with     RECHECK     Mild persistent asthma without complication       A/P:  62M HTN, HLD, CAD, HANNAH being seen for mild-persistent asthma f/u. ACT 17 today. Although ACT score slightly low, he feels improved and would like to continue current management.     - continue ICS-LABA (Advair), rinse mouth out after use  - advised to return to twice daily dosing if SOB worsens   - continue montelukast  - continue albuterol   - OK to substitute medications based on formulary     History:  62M HTN, HLD, CAD, HANNAH being seen for asthma f/u. Last seen 9/2022. He was started on ICS-LABA (Advair). He is also prescribed montelukast and prn albuterol. Has been doing well. Using Advair once daily (not twice) and montelukast. Using albuterol a few times per week, does help. TEIXEIRA seems to be most present when he is out doing things. No SOB at rest. Rare wheezing. Improved cough. No nocturnal cough. No orthopnea or PND.     Smoking: never                            Bird exposure: no             Animal exposure: 2 dogs        Inhalation exposure: no    Occupation: works for an [employer] firm                        10 point review of systems negative, aside from that mentioned in HPI.    BP (!) 144/84 (BP Location: Left arm, Patient Position: Sitting, Cuff Size: Adult Large)   Pulse 92   Wt 114.5 kg (252 lb 8 oz)   SpO2 97%   BMI 34.25 kg/m    Gen: well-appearing  HEENT: Mallampati IV  Card: RRR  Pulm: clear bilaterally   Abd: soft  MSK: no edema, no acute joint abnormality   Skin: no obvious rash  Psych: normal affect  Neuro: alert and oriented     Labs:  Personally reviewed  Abs eos (12/2022) - 300    Imaging/Studies: Personally reviewed  PFTs (9/2022) - normal pulmonary function  CXR (5/2022) - no acute infiltrates    Past Medical History:   Diagnosis Date     Acute prostatitis 12/2004     Calculus of kidney 1994    no recurrence     Cervical  radiculopathy 10/04/2016    R side     Class 1 obesity due to excess calories without serious comorbidity with body mass index (BMI) of 31.0 to 31.9 in adult 07/06/2020     Coronary artery disease due to lipid rich plaque     coronary arteryt calcium score of 22, 63rd percentile in 2016     Dry eye syndrome      Hypertension      Idiopathic urticaria      Impotence of organic origin      Insomnia      Mild intermittent asthma 1975    hx in childhood     Mixed hyperlipidemia      Obesity      HANNAH (obstructive sleep apnea)      Other acne      Prostatitis      Seborrhea capitis      Tinnitus      Varicella without mention of complication      Past Surgical History:   Procedure Laterality Date     COLONOSCOPY  2012     COLONOSCOPY N/A 4/28/2022    Procedure: COLONOSCOPY;  Surgeon: Echo Caldera MD;  Location:  GI     DENTAL SURGERY  1980    wisdom teeth     ENDOSCOPY DRUG INDUCED SLEEP N/A 6/3/2020    Procedure: DRUG-INDUCED SLEEP ENDOSCOPY;  Surgeon: Gladys Marroquin MD;  Location:  OR     IMPLANT GENERATOR STIMULATOR (LOCATION) Right 10/28/2020    Procedure: INSERTION, PULSE GENERATOR, NEUROSTIMULATOR;  Surgeon: Gladys Marroquin MD;  [place] [facility]     PHOTOREFRACTIVE KERATECTOMY  2000    s/p Lasik surgery     TONSILLECTOMY  1971    s/p Tonsillectomy     Family History   Problem Relation Age of Onset     Cancer Father         lung, smoker     Breast Cancer Mother         at age  45     Depression Mother      Obesity Mother      Eye Disorder Mother         cataracts     Hyperlipidemia Mother      Hypertension Mother      Coronary Artery Disease Mother         First MI betweeen 61 and 66 year of age, 3 MIs in past 15 years     Pulmonary Embolism Mother         on Eliquis     Coronary Artery Disease Brother         MI at age 30, pericarditis then MI     Coronary Artery Disease Brother         CABG x 4 at age 48, smoker     Hyperlipidemia Brother      Obesity Brother         Has  lost over 100 pounds     Social History     Socioeconomic History     Marital status:      Spouse name: Acosta Zuleta     Number of children: 0     Years of education: 18     Highest education level: Not on file   Occupational History     Occupation: Research Analyst     Employer: [employer]     Occupation: Consultant     Comment: self-employed   Tobacco Use     Smoking status: Never     Smokeless tobacco: Never     Tobacco comments:     cigar occ   Substance and Sexual Activity     Alcohol use: Yes     Comment: 2-4 beers per week     Drug use: No     Sexual activity: Yes     Partners: Female     Birth control/protection: Pill   Other Topics Concern     Not on file   Social History Narrative    Social Documentation:        Balanced Diet: YES    Calcium intake: 1-2 per day    Caffeine: 1 cups coffee per day-during week    Exercise:  type of activity walk;  3 days / week  exercycle, treadmill    Sunscreen: Yes    Seatbelts:  Yes    Self Testicular Exam: Yes    Physical/Emotional/Sexual Abuse: No     Do you feel safe in your environment? Yes        Cholesterol screen up to date: No-Discussed    Eye Exam up to date: Yes    Dental Exam up to date: Yes    Dexa Scan up to date: Does Not Apply    Colonoscopy up to date: Does Not Apply    Immunizations up to date: Yes     Glucose screen if over 40:  No- Discussed        Brittany Canada MA     Social Determinants of Health     Financial Resource Strain: Not on file   Food Insecurity: Not on file   Transportation Needs: Not on file   Physical Activity: Not on file   Stress: Not on file   Social Connections: Not on file   Intimate Partner Violence: Not on file   Housing Stability: Not on file       35 minutes spent reviewing chart, reviewing test results, talking with and examining patient, formulating plan, and documentation on the day of the encounter.    Xavier Umanzor MD  Pulmonary and Critical Care Medicine  [facility]

## 2023-02-06 ENCOUNTER — OFFICE VISIT (OUTPATIENT)
Dept: PULMONOLOGY | Facility: CLINIC | Age: 63
End: 2023-02-06
Payer: COMMERCIAL

## 2023-02-06 VITALS
SYSTOLIC BLOOD PRESSURE: 144 MMHG | OXYGEN SATURATION: 97 % | DIASTOLIC BLOOD PRESSURE: 84 MMHG | WEIGHT: 252.5 LBS | BODY MASS INDEX: 34.25 KG/M2 | HEART RATE: 92 BPM

## 2023-02-06 DIAGNOSIS — J45.30 MILD PERSISTENT ASTHMA WITHOUT COMPLICATION: Primary | ICD-10-CM

## 2023-02-06 PROCEDURE — 99214 OFFICE O/P EST MOD 30 MIN: CPT | Performed by: STUDENT IN AN ORGANIZED HEALTH CARE EDUCATION/TRAINING PROGRAM

## 2023-02-06 ASSESSMENT — ASTHMA QUESTIONNAIRES
QUESTION_5 LAST FOUR WEEKS HOW WOULD YOU RATE YOUR ASTHMA CONTROL: WELL CONTROLLED
ACT_TOTALSCORE: 17
ACT_TOTALSCORE: 17
QUESTION_4 LAST FOUR WEEKS HOW OFTEN HAVE YOU USED YOUR RESCUE INHALER OR NEBULIZER MEDICATION (SUCH AS ALBUTEROL): TWO OR THREE TIMES PER WEEK
QUESTION_2 LAST FOUR WEEKS HOW OFTEN HAVE YOU HAD SHORTNESS OF BREATH: THREE TO SIX TIMES A WEEK
QUESTION_3 LAST FOUR WEEKS HOW OFTEN DID YOUR ASTHMA SYMPTOMS (WHEEZING, COUGHING, SHORTNESS OF BREATH, CHEST TIGHTNESS OR PAIN) WAKE YOU UP AT NIGHT OR EARLIER THAN USUAL IN THE MORNING: ONCE A WEEK
QUESTION_1 LAST FOUR WEEKS HOW MUCH OF THE TIME DID YOUR ASTHMA KEEP YOU FROM GETTING AS MUCH DONE AT WORK, SCHOOL OR AT HOME: A LITTLE OF THE TIME

## 2023-02-06 ASSESSMENT — PAIN SCALES - GENERAL: PAINLEVEL: NO PAIN (0)

## 2023-02-06 NOTE — PATIENT INSTRUCTIONS
Continue Advair, rinse your mouth out after use. If you progression of shortness of breath, start using Advair twice daily. Continue as needed albuterol. Continue Singulair. I will see back in 6 months.

## 2023-02-06 NOTE — NURSING NOTE
Chief Complaint   Patient presents with     RECHECK     Mild persistent asthma without complication       Vitals:    02/06/23 1426   BP: (!) 144/84   BP Location: Left arm   Patient Position: Sitting   Cuff Size: Adult Large   Pulse: 92   SpO2: 97%   Weight: 114.5 kg (252 lb 8 oz)       Body mass index is 34.25 kg/m .     Jean Claude Abraham MA

## 2023-02-06 NOTE — LETTER
2/6/2023         RE: Speedy Carlson  [address] [facility] [address]        Dear Colleague,    Thank you for referring your patient, Speedy Carlson, to the [facility] [place]. Please see a copy of my visit note below.    Pulmonary Clinic Note    Date of Service: 2/6/2023    Chief Complaint   Patient presents with     RECHECK     Mild persistent asthma without complication       A/P:  62M HTN, HLD, CAD, HANNAH being seen for mild-persistent asthma f/u. ACT 17 today. Although ACT score slightly low, he feels improved and would like to continue current management.     - continue ICS-LABA (Advair), rinse mouth out after use  - advised to return to twice daily dosing if SOB worsens   - continue montelukast  - continue albuterol   - OK to substitute medications based on formulary     History:  62M HTN, HLD, CAD, HANNAH being seen for asthma f/u. Last seen 9/2022. He was started on ICS-LABA (Advair). He is also prescribed montelukast and prn albuterol. Has been doing well. Using Advair once daily (not twice) and montelukast. Using albuterol a few times per week, does help. TEIXEIRA seems to be most present when he is out doing things. No SOB at rest. Rare wheezing. Improved cough. No nocturnal cough. No orthopnea or PND.     Smoking: never                            Bird exposure: no             Animal exposure: 2 dogs        Inhalation exposure: no    Occupation: works for an [employer] firm                        10 point review of systems negative, aside from that mentioned in HPI.    BP (!) 144/84 (BP Location: Left arm, Patient Position: Sitting, Cuff Size: Adult Large)   Pulse 92   Wt 114.5 kg (252 lb 8 oz)   SpO2 97%   BMI 34.25 kg/m    Gen: well-appearing  HEENT: Mallampati IV  Card: RRR  Pulm: clear bilaterally   Abd: soft  MSK: no edema, no acute joint abnormality   Skin: no obvious rash  Psych: normal affect  Neuro: alert and oriented     Labs:  Personally reviewed  Abs  eos (12/2022) - 300    Imaging/Studies: Personally reviewed  PFTs (9/2022) - normal pulmonary function  CXR (5/2022) - no acute infiltrates    Past Medical History:   Diagnosis Date     Acute prostatitis 12/2004     Calculus of kidney 1994    no recurrence     Cervical radiculopathy 10/04/2016    R side     Class 1 obesity due to excess calories without serious comorbidity with body mass index (BMI) of 31.0 to 31.9 in adult 07/06/2020     Coronary artery disease due to lipid rich plaque     coronary arteryt calcium score of 22, 63rd percentile in 2016     Dry eye syndrome      Hypertension      Idiopathic urticaria      Impotence of organic origin      Insomnia      Mild intermittent asthma 1975    hx in childhood     Mixed hyperlipidemia      Obesity      HANNAH (obstructive sleep apnea)      Other acne      Prostatitis      Seborrhea capitis      Tinnitus      Varicella without mention of complication      Past Surgical History:   Procedure Laterality Date     COLONOSCOPY  2012     COLONOSCOPY N/A 4/28/2022    Procedure: COLONOSCOPY;  Surgeon: Echo Caldera MD;  Location:  GI     DENTAL SURGERY  1980    wisdom teeth     ENDOSCOPY DRUG INDUCED SLEEP N/A 6/3/2020    Procedure: DRUG-INDUCED SLEEP ENDOSCOPY;  Surgeon: Gladys Marroquin MD;  Location:  OR     IMPLANT GENERATOR STIMULATOR (LOCATION) Right 10/28/2020    Procedure: INSERTION, PULSE GENERATOR, NEUROSTIMULATOR;  Surgeon: Gladys Marroquin MD;  Location: [facility] OR     PHOTOREFRACTIVE KERATECTOMY  2000    s/p Lasik surgery     TONSILLECTOMY  1971    s/p Tonsillectomy     Family History   Problem Relation Age of Onset     Cancer Father         lung, smoker     Breast Cancer Mother         at age  45     Depression Mother      Obesity Mother      Eye Disorder Mother         cataracts     Hyperlipidemia Mother      Hypertension Mother      Coronary Artery Disease Mother         First MI betweeen 61 and 66 year of age, 3 MIs in past  15 years     Pulmonary Embolism Mother         on Eliquis     Coronary Artery Disease Brother         MI at age 30, pericarditis then MI     Coronary Artery Disease Brother         CABG x 4 at age 48, smoker     Hyperlipidemia Brother      Obesity Brother         Has lost over 100 pounds     Social History     Socioeconomic History     Marital status:      Spouse name: Acosta Zuleta     Number of children: 0     Years of education: 18     Highest education level: Not on file   Occupational History     Occupation: Research Analyst     Employer: [employer]     Occupation: Consultant     Comment: self-employed   Tobacco Use     Smoking status: Never     Smokeless tobacco: Never     Tobacco comments:     cigar occ   Substance and Sexual Activity     Alcohol use: Yes     Comment: 2-4 beers per week     Drug use: No     Sexual activity: Yes     Partners: Female     Birth control/protection: Pill   Other Topics Concern     Not on file   Social History Narrative    Social Documentation:        Balanced Diet: YES    Calcium intake: 1-2 per day    Caffeine: 1 cups coffee per day-during week    Exercise:  type of activity walk;  3 days / week  exercycle, treadmill    Sunscreen: Yes    Seatbelts:  Yes    Self Testicular Exam: Yes    Physical/Emotional/Sexual Abuse: No     Do you feel safe in your environment? Yes        Cholesterol screen up to date: No-Discussed    Eye Exam up to date: Yes    Dental Exam up to date: Yes    Dexa Scan up to date: Does Not Apply    Colonoscopy up to date: Does Not Apply    Immunizations up to date: Yes     Glucose screen if over 40:  No- Discussed        Brittany Canada MA     Social Determinants of Health     Financial Resource Strain: Not on file   Food Insecurity: Not on file   Transportation Needs: Not on file   Physical Activity: Not on file   Stress: Not on file   Social Connections: Not on file   Intimate Partner Violence: Not on file   Housing Stability: Not on file        35 minutes spent reviewing chart, reviewing test results, talking with and examining patient, formulating plan, and documentation on the day of the encounter.    Xavier Umanzor MD  Pulmonary and Critical Care Medicine  [facility]

## 2023-02-10 ENCOUNTER — TELEPHONE (OUTPATIENT)
Dept: ENDOCRINOLOGY | Facility: CLINIC | Age: 63
End: 2023-02-10
Payer: COMMERCIAL

## 2023-02-14 ENCOUNTER — MYC MEDICAL ADVICE (OUTPATIENT)
Dept: ENDOCRINOLOGY | Facility: CLINIC | Age: 63
End: 2023-02-14
Payer: COMMERCIAL

## 2023-02-14 DIAGNOSIS — E66.3 OVERWEIGHT (BMI 25.0-29.9): ICD-10-CM

## 2023-02-14 RX ORDER — NALTREXONE HYDROCHLORIDE AND BUPROPION HYDROCHLORIDE 8; 90 MG/1; MG/1
2 TABLET, EXTENDED RELEASE ORAL 2 TIMES DAILY
Qty: 120 TABLET | Refills: 5 | Status: SHIPPED | OUTPATIENT
Start: 2023-02-14 | End: 2023-12-06

## 2023-02-14 NOTE — TELEPHONE ENCOUNTER
I spoke to Chris at [facility]. I'm not sure if pharmacy is running their claim through correct insurance. Chris ran a test claim for #120/30 days and it pays. I had to leave [facility] for Birdie Sparrow to call insurance at [phone] if they continue to have trouble as I can't help them get the claim paid.

## 2023-02-14 NOTE — TELEPHONE ENCOUNTER
[facility]. She states she spoke to insurance and was told this is excluded from his plan. She asked that I follow up with insurance.

## 2023-02-14 NOTE — TELEPHONE ENCOUNTER
Prior Authorization Not Needed per Insurance    Medication: naltrexone-bupropion (CONTRAVE) 8-90 MG per 12 hr tablet-PA not needed  Insurance Company: [employer] - Phone [phone] Fax [phone]  Expected CoPay:      Pharmacy Filling the Rx: [id number] - [place], [place] - [address] [facility] [address]  Pharmacy Notified: Yes  Patient Notified: No

## 2023-02-21 DIAGNOSIS — J45.30 MILD PERSISTENT ASTHMA WITHOUT COMPLICATION: ICD-10-CM

## 2023-02-21 RX ORDER — FLUTICASONE PROPIONATE AND SALMETEROL XINAFOATE 115; 21 UG/1; UG/1
2 AEROSOL, METERED RESPIRATORY (INHALATION) 2 TIMES DAILY
Qty: 12 G | Refills: 3 | Status: SHIPPED | OUTPATIENT
Start: 2023-02-21 | End: 2023-10-09

## 2023-02-21 NOTE — TELEPHONE ENCOUNTER
Requested Prescriptions   Pending Prescriptions Disp Refills     fluticasone-salmeterol (ADVAIR HFA) 115-21 MCG/ACT inhaler 12 g 3     Sig: Inhale 2 puffs into the lungs 2 times daily       There is no refill protocol information for this order      Last Written Prescription Date:  09/14/2022  Last Fill Quantity: 12 g,  # refills: 3   Last office visit: 2/6/2023 with prescribing provider:  Dr. Umanzor  Future Office Visit:  08/09/2023

## 2023-05-10 ENCOUNTER — VIRTUAL VISIT (OUTPATIENT)
Dept: UROLOGY | Facility: CLINIC | Age: 63
End: 2023-05-10
Payer: COMMERCIAL

## 2023-05-10 VITALS — BODY MASS INDEX: 32.51 KG/M2 | WEIGHT: 240 LBS | HEIGHT: 72 IN

## 2023-05-10 DIAGNOSIS — R97.20 ELEVATED PROSTATE SPECIFIC ANTIGEN (PSA): Primary | ICD-10-CM

## 2023-05-10 PROCEDURE — 99213 OFFICE O/P EST LOW 20 MIN: CPT | Mod: VID | Performed by: STUDENT IN AN ORGANIZED HEALTH CARE EDUCATION/TRAINING PROGRAM

## 2023-05-10 ASSESSMENT — PAIN SCALES - GENERAL: PAINLEVEL: NO PAIN (0)

## 2023-05-10 NOTE — PROGRESS NOTES
SEND LINK TO CELL PHONE    Issa is a 62 year old who is being evaluated via a billable video visit.      How would you like to obtain your AVS? MyChart  If the video visit is dropped, the invitation should be resent by: Text to cell phone: [phone]  Will anyone else be joining your video visit? No        Video-Visit Details    Type of service:  Video Visit   Start time: 4:33 PM  Video end time: 4:41 PM    Originating Location (pt. Location): Home    Distant Location (provider location):  On-site  Platform used for Video Visit: Javelin      HPI:  Speedy Carlson is a 62 year old male being seen for elevated PSA.  Duration of problem: Few years  Previous treatments: Prostate biopsy      Reviewed previous notes  Does not have any significant issues  PSA repeat has shown minor elevation           Review of Systems:  From intake questionnaire     Skin: negative  Eyes: negative  Ears/Nose/Throat: negative  Respiratory: No shortness of breath, dyspnea on exertion, cough, or hemoptysis  Cardiovascular: No chest pain or palpitations  Gastrointestinal: negative; no nausea/vomiting, constipation or diarrhea  Genitourinary: as per HPI  Musculoskeletal: negative  Neurologic: negative  Psychiatric: negative  Hematologic/Lymphatic/Immunologic: negative  Endocrine: negative         Physical Exam:   This is a virtual visit    Alert, no acute distress, oriented, conversant    Ears/nose/mouth: mouth:normal, good dentition  Respiratory: no respiratory distress, or pursed lip breathing  Cardiovascular:no obvious jugular venous distension present  Skin: no suspicious lesions or rashes on Visible body parts on the Screen  Neuro: Alert, oriented, speech and mentation normal  Psych: affect and mood normal, alert and oriented to person, place and time  Review of Imaging:  The following imaging exams were independently viewed and interpreted by me and discussed with patient:  Prostate size: 79 g on transrectal ultrasound    Review of  Labs:  The following labs were reviewed by me and discussed with the patient:  PSA: Abnormal: 6.9 ng/L    Assessment & Plan     Elevated prostate specific antigen (PSA)  PSA has slightly risen though its not very concerning  He tells me that his HANNAH device is MRI compatible but not sure what strength of the MRI machine its compatible with  Would recommend continuing yearly PSAs for now  Rectal ultrasound at the time of the biopsy measured the prostate at 79 g  We will consider repeat biopsy if PSA closer to 10  - PSA tumor marker [[id number]]; Future      Antwancamille Roche MD  [facility] [place]      ==========================    Additional Billing and Coding Information:  Review of external notes as documented above   Review of the result(s) of each unique test - PSA    Independent interpretation of a test performed by another physician/other qualified health care professional (not separately reported) -       Discussion of management or test interpretation with external physician/other qualified healthcare professional/appropriate source -         16 minutes spent by me on the date of the encounter doing chart review, review of test results, interpretation of tests, patient visit and documentation

## 2023-05-10 NOTE — LETTER
5/10/2023       RE: Speedy Carlson  [address] [facility] [address]     Dear Colleague,    Thank you for referring your patient, Speedy Carlson, to the [facility] [place] [facility]. Please see a copy of my visit note below.    SEND LINK TO CELL PHONE    Issa is a 62 year old who is being evaluated via a billable video visit.      How would you like to obtain your AVS? MyChart  If the video visit is dropped, the invitation should be resent by: Text to cell phone: [phone]  Will anyone else be joining your video visit? No        Video-Visit Details    Type of service:  Video Visit   Start time: 4:33 PM  Video end time: 4:41 PM    Originating Location (pt. Location): Home    Distant Location (provider location):  On-site  Platform used for Video Visit: ThinkUp      HPI:  Speedy Carlson is a 62 year old male being seen for elevated PSA.  Duration of problem: Few years  Previous treatments: Prostate biopsy      Reviewed previous notes  Does not have any significant issues  PSA repeat has shown minor elevation           Review of Systems:  From intake questionnaire     Skin: negative  Eyes: negative  Ears/Nose/Throat: negative  Respiratory: No shortness of breath, dyspnea on exertion, cough, or hemoptysis  Cardiovascular: No chest pain or palpitations  Gastrointestinal: negative; no nausea/vomiting, constipation or diarrhea  Genitourinary: as per HPI  Musculoskeletal: negative  Neurologic: negative  Psychiatric: negative  Hematologic/Lymphatic/Immunologic: negative  Endocrine: negative         Physical Exam:   This is a virtual visit    Alert, no acute distress, oriented, conversant    Ears/nose/mouth: mouth:normal, good dentition  Respiratory: no respiratory distress, or pursed lip breathing  Cardiovascular:no obvious jugular venous distension present  Skin: no suspicious lesions or rashes on Visible body parts on the  Screen  Neuro: Alert, oriented, speech and mentation normal  Psych: affect and mood normal, alert and oriented to person, place and time  Review of Imaging:  The following imaging exams were independently viewed and interpreted by me and discussed with patient:  Prostate size: 79 g on transrectal ultrasound    Review of Labs:  The following labs were reviewed by me and discussed with the patient:  PSA: Abnormal: 6.9 ng/L    Assessment & Plan     Elevated prostate specific antigen (PSA)  PSA has slightly risen though its not very concerning  He tells me that his HANNAH device is MRI compatible but not sure what strength of the MRI machine its compatible with  Would recommend continuing yearly PSAs for now  Rectal ultrasound at the time of the biopsy measured the prostate at 79 g  We will consider repeat biopsy if PSA closer to 10  - PSA tumor marker [[id number]]; Future      Antwancamille Roche MD  [facility] [place]      ==========================    Additional Billing and Coding Information:  Review of external notes as documented above   Review of the result(s) of each unique test - PSA    Independent interpretation of a test performed by another physician/other qualified health care professional (not separately reported) -       Discussion of management or test interpretation with external physician/other qualified healthcare professional/appropriate source -         16 minutes spent by me on the date of the encounter doing chart review, review of test results, interpretation of tests, patient visit and documentation

## 2023-06-01 ENCOUNTER — MYC MEDICAL ADVICE (OUTPATIENT)
Dept: ENDOCRINOLOGY | Facility: CLINIC | Age: 63
End: 2023-06-01

## 2023-06-01 ENCOUNTER — TELEPHONE (OUTPATIENT)
Dept: SURGERY | Facility: CLINIC | Age: 63
End: 2023-06-01

## 2023-06-01 ENCOUNTER — VIRTUAL VISIT (OUTPATIENT)
Dept: ENDOCRINOLOGY | Facility: CLINIC | Age: 63
End: 2023-06-01
Payer: COMMERCIAL

## 2023-06-01 VITALS — WEIGHT: 242 LBS | HEIGHT: 72 IN | BODY MASS INDEX: 32.78 KG/M2

## 2023-06-01 DIAGNOSIS — E66.811 CLASS 1 OBESITY WITH SERIOUS COMORBIDITY AND BODY MASS INDEX (BMI) OF 32.0 TO 32.9 IN ADULT, UNSPECIFIED OBESITY TYPE: Primary | ICD-10-CM

## 2023-06-01 PROBLEM — E66.3 OVERWEIGHT (BMI 25.0-29.9): Status: RESOLVED | Noted: 2021-01-06 | Resolved: 2023-06-01

## 2023-06-01 PROCEDURE — 99213 OFFICE O/P EST LOW 20 MIN: CPT | Mod: VID | Performed by: PHYSICIAN ASSISTANT

## 2023-06-01 ASSESSMENT — PAIN SCALES - GENERAL: PAINLEVEL: NO PAIN (0)

## 2023-06-01 NOTE — LETTER
2023       RE: Speedy Carlson  [address] [facility] [address]     Dear Colleague,    Thank you for referring your patient, Speedy Carlson, to the [facility] [place] [facility]. Please see a copy of my visit note below.      Return Medical Weight Management Note     Speedy Carlson  MRN:  [id number]  :  1960  CAMILO:  2023    Dear Steve Mcmahon MD,    I had the pleasure of seeing your patient Speedy Carlson. He is a 62 year old male who I am continuing to see for treatment of obesity related to:        2019    10:06 AM   --   I have the following health issues associated with obesity Heart Disease    High Blood Pressure    High Cholesterol    Sleep Apnea   I have the following symptoms associated with obesity None of the above       Assessment & Plan   Problem List Items Addressed This Visit    None  Visit Diagnoses       Class 1 obesity with serious comorbidity and body mass index (BMI) of 32.0 to 32.9 in adult, unspecified obesity type    -  Primary             Weight mgmt follow up  Has been on Contrave and maintained 10 lbs (4% TBW) loss but not losing more  Doesn't feel contrave is effective and wants to taper off  Wants to discuss other options but didn't tolerate saxenda, ozempic, topiramate and phentermine contraindicated.  Could consider Mounjaro in the future when FDA approved for obesity  Go to Contrave 1 tab daily x 1 week then stop.  Can restart if needed in the future    Follow up Mary 6 months return [facility]      INTERVAL HISTORY:  [facility] follow up  Last visit 1 year ago  Wants to discuss stopping contrave, doesn't feel it has been helping.  Has already been taking 2 per day instead of 4.       Anti-obesity medications:     Current:   Contrave 2 pills daily    Failed/contraindicated:   Saxenda/ozempic: GI upset, abdominal cramping  Topiramate: anxiety, shaky  Phentermine: not  appropriate due to family cardiac history       Recent exercise/activity changes: walking more and more active in general with summer    CURRENT WEIGHT:   242 lbs 0 oz    Highest: 252  Initial Weight (lbs): 252.3 lbs  Last Visits Weight: 108.9 kg (240 lb)  Cumulative weight loss (lbs): 10.3  Weight Loss Percentage: 4.08%        5/30/2023    10:27 AM   Changes and Difficulties   I have made the following changes to my diet since my last visit: More exercise   With regards to my diet, I am still struggling with: sugar   I have made the following changes to my activity/exercise since my last visit: more exercise         MEDICATIONS:   Current Outpatient Medications   Medication Sig Dispense Refill    albuterol (PROAIR HFA/PROVENTIL HFA/VENTOLIN HFA) 108 (90 Base) MCG/ACT inhaler Inhale 1-2 puffs into the lungs every 4 hours as needed       fluticasone-salmeterol (ADVAIR HFA) 115-21 MCG/ACT inhaler Inhale 2 puffs into the lungs 2 times daily 12 g 3    montelukast (SINGULAIR) 10 MG tablet TAKE 1 TABLET(10 MG) BY MOUTH AT BEDTIME 30 tablet 1    naltrexone-bupropion (CONTRAVE) 8-90 MG per 12 hr tablet Take 2 tablets by mouth 2 times daily 120 tablet 5    pravastatin (PRAVACHOL) 40 MG tablet Take 40 mg by mouth every morning       traZODone (DESYREL) 50 MG tablet Take 150 mg by mouth At Bedtime       zolpidem (AMBIEN) 5 MG tablet Take 5 mg by mouth nightly as needed for sleep       amLODIPine (NORVASC) 5 MG tablet Take 5 mg by mouth every morning       ASPIRIN NOT PRESCRIBED (INTENTIONAL) Please choose reason not prescribed from choices below.             5/30/2023    10:27 AM   Weight Loss Medication History Reviewed With Patient   Which weight loss medications are you currently taking on a regular basis? Contrave (naltrexone/bupropion)   Are you having any side effects from the weight loss medication that we have prescribed you? No       [facility]/Nurse Visit on 09/14/2022   Component Date Value Ref Range Status     FVC-Pred 09/14/2022 4.97  L Final    FVC-Pre 09/14/2022 5.14  L Final    FVC-%Pred-Pre 09/14/2022 103  % Final    FEV1-Pre 09/14/2022 4.07  L Final    FEV1-%Pred-Pre 09/14/2022 106  % Final    FEV1FVC-Pred 09/14/2022 77  % Final    FEV1FVC-Pre 09/14/2022 79  % Final    FEFMax-Pred 09/14/2022 9.68  L/sec Final    FEFMax-Pre 09/14/2022 9.96  L/sec Final    FEFMax-%Pred-Pre 09/14/2022 102  % Final    FEF2575-Pred 09/14/2022 3.14  L/sec Final    FEF2575-Pre 09/14/2022 3.75  L/sec Final    OYG7718-%Pred-Pre 09/14/2022 119  % Final    ExpTime-Pre 09/14/2022 3.42  sec Final    FIFMax-Pre 09/14/2022 5.39  L/sec Final    VC-Pred 09/14/2022 5.34  L Final    VC-Pre 09/14/2022 5.51  L Final    VC-%Pred-Pre 09/14/2022 103  % Final    IC-Pred 09/14/2022 4.32  L Final    IC-Pre 09/14/2022 4.72  L Final    IC-%Pred-Pre 09/14/2022 109  % Final    ERV-Pred 09/14/2022 1.02  L Final    ERV-Pre 09/14/2022 0.78  L Final    ERV-%Pred-Pre 09/14/2022 76  % Final    FEV1FEV6-Pred 09/14/2022 79  % Final    FEV1FEV6-Pre 09/14/2022 80  % Final    FRCPleth-Pred 09/14/2022 3.73  L Final    FRCPleth-Pre 09/14/2022 3.56  L Final    FRCPleth-%Pred-Pre 09/14/2022 95  % Final    RVPleth-Pred 09/14/2022 2.49  L Final    RVPleth-Pre 09/14/2022 2.77  L Final    RVPleth-%Pred-Pre 09/14/2022 111  % Final    TLCPleth-Pred 09/14/2022 7.53  L Final    TLCPleth-Pre 09/14/2022 8.28  L Final    TLCPleth-%Pred-Pre 09/14/2022 109  % Final    DLCOunc-Pred 09/14/2022 29.36  ml/min/mmHg Final    DLCOunc-Pre 09/14/2022 30.73  ml/min/mmHg Final    DLCOunc-%Pred-Pre 09/14/2022 104  % Final    VA-Pre 09/14/2022 7.48  L Final    VA-%Pred-Pre 09/14/2022 106  % Final    FEV1SVC-Pred 09/14/2022 72  % Final    FEV1SVC-Pre 09/14/2022 74  % Final       PHYSICAL EXAM:  Objective    Ht 1.829 m (6')   Wt 109.8 kg (242 lb)   BMI 32.82 kg/m      Vitals - Patient Reported  Pain Score: No Pain (0)        GENERAL: Healthy, alert and no distress  EYES: Eyes grossly normal to  inspection.  No discharge or erythema, or obvious scleral/conjunctival abnormalities.  RESP: No audible wheeze, cough, or visible cyanosis.  No visible retractions or increased work of breathing.    SKIN: Visible skin clear. No significant rash, abnormal pigmentation or lesions.  NEURO: Cranial nerves grossly intact.  Mentation and speech appropriate for age.  PSYCH: Mentation appears normal, affect normal/bright, judgement and insight intact, normal speech and appearance well-groomed.        Sincerely,    Mary Childers PA-C      20 minutes spent by me on the date of the encounter doing chart review, history and exam, documentation and further activities per the note        Virtual Visit Details    Type of service:  Video Visit     Originating Location (pt. Location): Home  Distant Location (provider location):  Off-site  Platform used for Video Visit: Melissa Mcmahon MD  [facility] [place] [address], [place] [address]

## 2023-06-01 NOTE — PROGRESS NOTES
Return Medical Weight Management Note     Speedy Carlson  MRN:  [id number]  :  1960  CAMILO:  2023    Dear Steve Mcmahon MD,    I had the pleasure of seeing your patient Speedy Carlson. He is a 62 year old male who I am continuing to see for treatment of obesity related to:        2019    10:06 AM   --   I have the following health issues associated with obesity Heart Disease    High Blood Pressure    High Cholesterol    Sleep Apnea   I have the following symptoms associated with obesity None of the above       Assessment & Plan   Problem List Items Addressed This Visit    None  Visit Diagnoses     Class 1 obesity with serious comorbidity and body mass index (BMI) of 32.0 to 32.9 in adult, unspecified obesity type    -  Primary           Weight mgmt follow up  Has been on Contrave and maintained 10 lbs (4% TBW) loss but not losing more  Doesn't feel contrave is effective and wants to taper off  Wants to discuss other options but didn't tolerate saxenda, ozempic, topiramate and phentermine contraindicated.  Could consider Mounjaro in the future when FDA approved for obesity  Go to Contrave 1 tab daily x 1 week then stop.  Can restart if needed in the future    Follow up Mary 6 months return MWM      INTERVAL HISTORY:  MWM follow up  Last visit 1 year ago  Wants to discuss stopping contrave, doesn't feel it has been helping.  Has already been taking 2 per day instead of 4.       Anti-obesity medications:     Current:   Contrave 2 pills daily    Failed/contraindicated:   Saxenda/ozempic: GI upset, abdominal cramping  Topiramate: anxiety, shaky  Phentermine: not appropriate due to family cardiac history       Recent exercise/activity changes: walking more and more active in general with summer    CURRENT WEIGHT:   242 lbs 0 oz    Highest: 252  Initial Weight (lbs): 252.3 lbs  Last Visits Weight: 108.9 kg (240 lb)  Cumulative weight loss (lbs): 10.3  Weight Loss Percentage: 4.08%        2023     10:27 AM   Changes and Difficulties   I have made the following changes to my diet since my last visit: More exercise   With regards to my diet, I am still struggling with: sugar   I have made the following changes to my activity/exercise since my last visit: more exercise         MEDICATIONS:   Current Outpatient Medications   Medication Sig Dispense Refill     albuterol (PROAIR HFA/PROVENTIL HFA/VENTOLIN HFA) 108 (90 Base) MCG/ACT inhaler Inhale 1-2 puffs into the lungs every 4 hours as needed        fluticasone-salmeterol (ADVAIR HFA) 115-21 MCG/ACT inhaler Inhale 2 puffs into the lungs 2 times daily 12 g 3     montelukast (SINGULAIR) 10 MG tablet TAKE 1 TABLET(10 MG) BY MOUTH AT BEDTIME 30 tablet 1     naltrexone-bupropion (CONTRAVE) 8-90 MG per 12 hr tablet Take 2 tablets by mouth 2 times daily 120 tablet 5     pravastatin (PRAVACHOL) 40 MG tablet Take 40 mg by mouth every morning        traZODone (DESYREL) 50 MG tablet Take 150 mg by mouth At Bedtime        zolpidem (AMBIEN) 5 MG tablet Take 5 mg by mouth nightly as needed for sleep        amLODIPine (NORVASC) 5 MG tablet Take 5 mg by mouth every morning        ASPIRIN NOT PRESCRIBED (INTENTIONAL) Please choose reason not prescribed from choices below.             5/30/2023    10:27 AM   Weight Loss Medication History Reviewed With Patient   Which weight loss medications are you currently taking on a regular basis? Contrave (naltrexone/bupropion)   Are you having any side effects from the weight loss medication that we have prescribed you? No       [facility]/Nurse Visit on 09/14/2022   Component Date Value Ref Range Status     FVC-Pred 09/14/2022 4.97  L Final     FVC-Pre 09/14/2022 5.14  L Final     FVC-%Pred-Pre 09/14/2022 103  % Final     FEV1-Pre 09/14/2022 4.07  L Final     FEV1-%Pred-Pre 09/14/2022 106  % Final     FEV1FVC-Pred 09/14/2022 77  % Final     FEV1FVC-Pre 09/14/2022 79  % Final     FEFMax-Pred 09/14/2022 9.68  L/sec Final     FEFMax-Pre  09/14/2022 9.96  L/sec Final     FEFMax-%Pred-Pre 09/14/2022 102  % Final     FEF2575-Pred 09/14/2022 3.14  L/sec Final     FEF2575-Pre 09/14/2022 3.75  L/sec Final     KXG9045-%Pred-Pre 09/14/2022 119  % Final     ExpTime-Pre 09/14/2022 3.42  sec Final     FIFMax-Pre 09/14/2022 5.39  L/sec Final     VC-Pred 09/14/2022 5.34  L Final     VC-Pre 09/14/2022 5.51  L Final     VC-%Pred-Pre 09/14/2022 103  % Final     IC-Pred 09/14/2022 4.32  L Final     IC-Pre 09/14/2022 4.72  L Final     IC-%Pred-Pre 09/14/2022 109  % Final     ERV-Pred 09/14/2022 1.02  L Final     ERV-Pre 09/14/2022 0.78  L Final     ERV-%Pred-Pre 09/14/2022 76  % Final     FEV1FEV6-Pred 09/14/2022 79  % Final     FEV1FEV6-Pre 09/14/2022 80  % Final     FRCPleth-Pred 09/14/2022 3.73  L Final     FRCPleth-Pre 09/14/2022 3.56  L Final     FRCPleth-%Pred-Pre 09/14/2022 95  % Final     RVPleth-Pred 09/14/2022 2.49  L Final     RVPleth-Pre 09/14/2022 2.77  L Final     RVPleth-%Pred-Pre 09/14/2022 111  % Final     TLCPleth-Pred 09/14/2022 7.53  L Final     TLCPleth-Pre 09/14/2022 8.28  L Final     TLCPleth-%Pred-Pre 09/14/2022 109  % Final     DLCOunc-Pred 09/14/2022 29.36  ml/min/mmHg Final     DLCOunc-Pre 09/14/2022 30.73  ml/min/mmHg Final     DLCOunc-%Pred-Pre 09/14/2022 104  % Final     VA-Pre 09/14/2022 7.48  L Final     VA-%Pred-Pre 09/14/2022 106  % Final     FEV1SVC-Pred 09/14/2022 72  % Final     FEV1SVC-Pre 09/14/2022 74  % Final       PHYSICAL EXAM:  Objective    Ht 1.829 m (6')   Wt 109.8 kg (242 lb)   BMI 32.82 kg/m      Vitals - Patient Reported  Pain Score: No Pain (0)        GENERAL: Healthy, alert and no distress  EYES: Eyes grossly normal to inspection.  No discharge or erythema, or obvious scleral/conjunctival abnormalities.  RESP: No audible wheeze, cough, or visible cyanosis.  No visible retractions or increased work of breathing.    SKIN: Visible skin clear. No significant rash, abnormal pigmentation or lesions.  NEURO: Cranial  nerves grossly intact.  Mentation and speech appropriate for age.  PSYCH: Mentation appears normal, affect normal/bright, judgement and insight intact, normal speech and appearance well-groomed.        Sincerely,    Mary Childers PA-C      20 minutes spent by me on the date of the encounter doing chart review, history and exam, documentation and further activities per the note        Virtual Visit Details    Type of service:  Video Visit     Originating Location (pt. Location): Home  Distant Location (provider location):  Off-site  Platform used for Video Visit: Melissa

## 2023-06-01 NOTE — NURSING NOTE
Is the patient currently in the state of [place]? YES    Visit mode:VIDEO    If the visit is dropped, the patient can be reconnected by: VIDEO VISIT: Send to e-mail at: [email or web address]    Will anyone else be joining the visit? NO      How would you like to obtain your AVS? MyChart    Are changes needed to the allergy or medication list? NO    Reason for visit: Follow Up

## 2023-06-02 NOTE — PATIENT INSTRUCTIONS
Weight mgmt follow up  Has been on Contrave and maintained 10 lbs (4% TBW) loss but not losing more  Doesn't feel contrave is effective and wants to taper off  Wants to discuss other options but didn't tolerate saxenda, ozempic, topiramate and phentermine contraindicated.  Could consider Mounjaro in the future when FDA approved for obesity  Go to Contrave 1 tab daily x 1 week then stop.  Can restart if needed in the future    Follow up Mary 6 months return SANCHEZ

## 2023-06-29 ENCOUNTER — TELEPHONE (OUTPATIENT)
Dept: PULMONOLOGY | Facility: CLINIC | Age: 63
End: 2023-06-29
Payer: COMMERCIAL

## 2023-08-18 NOTE — Clinical Note
Follow up in 3 months Mary Childers RD soon or in 3 months, patient preference   Stelara Pregnancy And Lactation Text: This medication is Pregnancy Category B and is considered safe during pregnancy. It is unknown if this medication is excreted in breast milk.

## 2023-08-23 ENCOUNTER — OFFICE VISIT (OUTPATIENT)
Dept: PODIATRY | Facility: CLINIC | Age: 63
End: 2023-08-23
Payer: COMMERCIAL

## 2023-08-23 VITALS
SYSTOLIC BLOOD PRESSURE: 129 MMHG | DIASTOLIC BLOOD PRESSURE: 84 MMHG | HEIGHT: 72 IN | WEIGHT: 242 LBS | BODY MASS INDEX: 32.78 KG/M2 | HEART RATE: 84 BPM

## 2023-08-23 DIAGNOSIS — Q66.70 PES CAVUS: Primary | ICD-10-CM

## 2023-08-23 DIAGNOSIS — M72.2 PLANTAR FASCIITIS: ICD-10-CM

## 2023-08-23 PROCEDURE — 99203 OFFICE O/P NEW LOW 30 MIN: CPT | Performed by: PODIATRIST

## 2023-08-23 ASSESSMENT — PAIN SCALES - GENERAL: PAINLEVEL: SEVERE PAIN (7)

## 2023-08-23 NOTE — PROGRESS NOTES
Assessment:      ICD-10-CM    1. Pes cavus  Q66.70 Orthotics and Prosthetics DME Orthotic; Foot Orthotics; Bilateral      2. Plantar fasciitis  M72.2 Orthotics and Prosthetics DME Orthotic; Foot Orthotics; Bilateral           Plan:  Orders Placed This Encounter   Procedures    Orthotics and Prosthetics DME Orthotic; Foot Orthotics; Bilateral         Discussed the etiology and treatment of the condition with the patient.  Imaging studies reviewed and discussed with the patient.  Discussed surgical and conservative options.    -Injection- to fascia / arch if needed  -NSAID- topical; Rx po if neeed  -Orthoses- SuperFeet, custom Rx  -Activity- low impact,calf muscle stretching.  -PT- Rx today for Graston/ASTYM,eccentric training discussed      Standing foot / ankle x-rays if symptoms not improved        Return:  No follow-ups on file.    Velia Frye DPM                Chief Complaint:     Patient presents with:  Right Foot - Pain  Left Foot - Pain         HPI:  Speedy Carlson is a 62 year old year old male who presents for evaluation of heel pain.    Entire bottom of feet  Not top    Not focally to heel or arch         Past Medical & Surgical History:  Past Medical History:   Diagnosis Date    Acute prostatitis 12/2004    Calculus of kidney 1994    no recurrence    Cervical radiculopathy 10/04/2016    R side    Class 1 obesity due to excess calories without serious comorbidity with body mass index (BMI) of 31.0 to 31.9 in adult 07/06/2020    Coronary artery disease due to lipid rich plaque     coronary arteryt calcium score of 22, 63rd percentile in 2016    Dry eye syndrome     Hypertension     Idiopathic urticaria     Impotence of organic origin     Insomnia     Mild intermittent asthma 1975    hx in childhood    Mixed hyperlipidemia     Obesity     HANNAH (obstructive sleep apnea)     Other acne     Prostatitis     Seborrhea capitis     Tinnitus     Varicella without mention of complication       Past Surgical  History:   Procedure Laterality Date    COLONOSCOPY  2012    COLONOSCOPY N/A 4/28/2022    Procedure: COLONOSCOPY;  Surgeon: Echo Caldera MD;  Location:  GI    DENTAL SURGERY  1980    wisdom teeth    ENDOSCOPY DRUG INDUCED SLEEP N/A 6/3/2020    Procedure: DRUG-INDUCED SLEEP ENDOSCOPY;  Surgeon: Gladys Marroquin MD;  [facility]    IMPLANT GENERATOR STIMULATOR (LOCATION) Right 10/28/2020    Procedure: INSERTION, PULSE GENERATOR, NEUROSTIMULATOR;  Surgeon: Gladys Marroquin MD;  Location: [facility] OR    PHOTOREFRACTIVE KERATECTOMY  2000    s/p Lasik surgery    TONSILLECTOMY  1971    s/p Tonsillectomy      Family History   Problem Relation Age of Onset    Cancer Father         lung, smoker    Breast Cancer Mother         at age  45    Depression Mother     Obesity Mother     Eye Disorder Mother         cataracts    Hyperlipidemia Mother     Hypertension Mother     Coronary Artery Disease Mother         First MI betweeen 60 and 65 year of age, 3 MIs in past 15 years    Pulmonary Embolism Mother         on Eliquis    Coronary Artery Disease Brother         MI at age 30, pericarditis then MI    Coronary Artery Disease Brother         CABG x 4 at age 48, smoker    Hyperlipidemia Brother     Obesity Brother         Has lost over 100 pounds        Social History:  ?  History   Smoking Status    Never   Smokeless Tobacco    Never     History   Drug Use No     Social History    Substance and Sexual Activity      Alcohol use: Yes        Comment: 2-4 beers per week      Allergies:  ?   Allergies   Allergen Reactions    Liraglutide GI Disturbance     Saxenda     Vitamin D3 [Cholecalciferol] Rash    Statins      Myalgias      Statins      Azithromycin Rash     Skin peeling        Medications:    Current Outpatient Medications   Medication    albuterol (PROAIR HFA/PROVENTIL HFA/VENTOLIN HFA) 108 (90 Base) MCG/ACT inhaler    amLODIPine (NORVASC) 5 MG tablet    ASPIRIN NOT PRESCRIBED (INTENTIONAL)     fluticasone-salmeterol (ADVAIR HFA) 115-21 MCG/ACT inhaler    montelukast (SINGULAIR) 10 MG tablet    naltrexone-bupropion (CONTRAVE) 8-90 MG per 12 hr tablet    pravastatin (PRAVACHOL) 40 MG tablet    traZODone (DESYREL) 50 MG tablet    zolpidem (AMBIEN) 5 MG tablet     No current facility-administered medications for this visit.       Physical Exam:  ?  Vitals:  /84   Pulse 84   Ht 1.829 m (6')   Wt 109.8 kg (242 lb)   BMI 32.82 kg/m     General:  WD/WN, in NAD.  A&O x3.  Dermatologic:    Skin is intact, open lesions absent.   Skin texture, turgor is normal.  Vascular:  Pulses palpable .  Digital capillary refill time normal.  Skin temperature is normal .  Generalized edema- 1+.  Focal edema- none.  Neurologic:    Gross sensation normal.  Gait and balance normal.  Musculoskeletal:  Maximal pain to palpation of plantar heel, arch into ball of foot bilateral.  no pain to palpation of posterior heel, Achilles tendon bilateral.  Ankle, STJ, MTJ ROM intact to affected extremity.  Muscle strength 5/5  foot and ankle to affected extremity.     Stance:  Foot type:  cavus

## 2023-08-23 NOTE — LETTER
8/23/2023         RE: Speedy Carlson  [address] [facility] [address]        Dear Colleague,    Thank you for referring your patient, Speedy Carlson, to the [facility]. Please see a copy of my visit note below.    Assessment:      ICD-10-CM    1. Pes cavus  Q66.70 Orthotics and Prosthetics DME Orthotic; Foot Orthotics; Bilateral      2. Plantar fasciitis  M72.2 Orthotics and Prosthetics DME Orthotic; Foot Orthotics; Bilateral           Plan:  Orders Placed This Encounter   Procedures     Orthotics and Prosthetics DME Orthotic; Foot Orthotics; Bilateral         Discussed the etiology and treatment of the condition with the patient.  Imaging studies reviewed and discussed with the patient.  Discussed surgical and conservative options.    -Injection- to fascia / arch if needed  -NSAID- topical; Rx po if neeed  -Orthoses- SuperFeet, custom Rx  -Activity- low impact,calf muscle stretching.  -PT- Rx today for Graston/ASTYM,eccentric training discussed      Standing foot / ankle x-rays if symptoms not improved        Return:  No follow-ups on file.    Velia Frye DPM                Chief Complaint:     Patient presents with:  Right Foot - Pain  Left Foot - Pain         HPI:  Speedy Carlson is a 62 year old year old male who presents for evaluation of heel pain.    Entire bottom of feet  Not top    Not focally to heel or arch         Past Medical & Surgical History:  Past Medical History:   Diagnosis Date     Acute prostatitis 12/2004     Calculus of kidney 1994    no recurrence     Cervical radiculopathy 10/04/2016    R side     Class 1 obesity due to excess calories without serious comorbidity with body mass index (BMI) of 31.0 to 31.9 in adult 07/06/2020     Coronary artery disease due to lipid rich plaque     coronary arteryt calcium score of 22, 63rd percentile in 2016     Dry eye syndrome      Hypertension      Idiopathic urticaria      Impotence of organic  origin      Insomnia      Mild intermittent asthma 1975    hx in childhood     Mixed hyperlipidemia      Obesity      HANNAH (obstructive sleep apnea)      Other acne      Prostatitis      Seborrhea capitis      Tinnitus      Varicella without mention of complication       Past Surgical History:   Procedure Laterality Date     COLONOSCOPY  2012     COLONOSCOPY N/A 4/28/2022    Procedure: COLONOSCOPY;  Surgeon: Echo Caldera MD;  Location:  GI     DENTAL SURGERY  1980    wisdom teeth     ENDOSCOPY DRUG INDUCED SLEEP N/A 6/3/2020    Procedure: DRUG-INDUCED SLEEP ENDOSCOPY;  Surgeon: Gladys Marroquin MD;  Location:  OR     IMPLANT GENERATOR STIMULATOR (LOCATION) Right 10/28/2020    Procedure: INSERTION, PULSE GENERATOR, NEUROSTIMULATOR;  Surgeon: Gladys Marroquin MD;  Location: [facility] OR     PHOTOREFRACTIVE KERATECTOMY  2000    s/p Lasik surgery     TONSILLECTOMY  1971    s/p Tonsillectomy      Family History   Problem Relation Age of Onset     Cancer Father         lung, smoker     Breast Cancer Mother         at age  45     Depression Mother      Obesity Mother      Eye Disorder Mother         cataracts     Hyperlipidemia Mother      Hypertension Mother      Coronary Artery Disease Mother         First MI betweeen 60 and 65 year of age, 3 MIs in past 15 years     Pulmonary Embolism Mother         on Eliquis     Coronary Artery Disease Brother         MI at age 30, pericarditis then MI     Coronary Artery Disease Brother         CABG x 4 at age 48, smoker     Hyperlipidemia Brother      Obesity Brother         Has lost over 100 pounds        Social History:  ?  History   Smoking Status     Never   Smokeless Tobacco     Never     History   Drug Use No     Social History    Substance and Sexual Activity      Alcohol use: Yes        Comment: 2-4 beers per week      Allergies:  ?   Allergies   Allergen Reactions     Liraglutide GI Disturbance     Saxenda      Vitamin D3 [Cholecalciferol]  Rash     Statins      Myalgias      Statins       Azithromycin Rash     Skin peeling        Medications:    Current Outpatient Medications   Medication     albuterol (PROAIR HFA/PROVENTIL HFA/VENTOLIN HFA) 108 (90 Base) MCG/ACT inhaler     amLODIPine (NORVASC) 5 MG tablet     ASPIRIN NOT PRESCRIBED (INTENTIONAL)     fluticasone-salmeterol (ADVAIR HFA) 115-21 MCG/ACT inhaler     montelukast (SINGULAIR) 10 MG tablet     naltrexone-bupropion (CONTRAVE) 8-90 MG per 12 hr tablet     pravastatin (PRAVACHOL) 40 MG tablet     traZODone (DESYREL) 50 MG tablet     zolpidem (AMBIEN) 5 MG tablet     No current facility-administered medications for this visit.       Physical Exam:  ?  Vitals:  /84   Pulse 84   Ht 1.829 m (6')   Wt 109.8 kg (242 lb)   BMI 32.82 kg/m     General:  WD/WN, in NAD.  A&O x3.  Dermatologic:    Skin is intact, open lesions absent.   Skin texture, turgor is normal.  Vascular:  Pulses palpable .  Digital capillary refill time normal.  Skin temperature is normal .  Generalized edema- 1+.  Focal edema- none.  Neurologic:    Gross sensation normal.  Gait and balance normal.  Musculoskeletal:  Maximal pain to palpation of plantar heel, arch into ball of foot bilateral.  no pain to palpation of posterior heel, Achilles tendon bilateral.  Ankle, STJ, MTJ ROM intact to affected extremity.  Muscle strength 5/5  foot and ankle to affected extremity.     Stance:  Foot type:  cavus              Again, thank you for allowing me to participate in the care of your patient.        Sincerely,        Velia Frye DPM

## 2023-08-23 NOTE — PATIENT INSTRUCTIONS
PATIENT INSTRUCTIONS - Podiatry / Foot & Ankle Surgery        [facility] LOCATIONS  [facility]  [address]  [place] [address]  Phone: [phone]  Fax: [phone] [facility]  [address]  [place] [address]  Phone: [phone]   Fax: [phone]   [facility]  [address]  [place] [address]  Phone: [phone]  Fax: [phone] [facility]  [address]  [place] [address]  Phone: [phone]   Fax: [phone]   [facility]   [address]  [place] [address]  Phone: [phone]  Fax: [phone] * Please call any location listed to make an appointment for a casting/fitting. Your referral was sent to their central office and they will all have the order on file.       SuperFeet orthotics  SuperFeet are over the counter (OTC), you do not need a prescription.  Wear Superfeet orthotics in all of your shoes.  Remove the existing liner and replace it with SuperFeet.    SuperFeet are available on [employer], at [employer] and most [employer].  Orange or Green color      Diclofenac / Voltaren Gel- Apply to affected area only.  Apply 3-4 times daily for the first 3-4 days, then 1-2 times daily as needed.  Over the counter (OTC), a prescription is not needed.  Available at most pharmacies, [employer], [employer].      Calf muscle stretching 2-3x daily as instructed, both with the knees straight and the knees bent. Hold for 30-60 seconds.  For personal instruction on this,  please request a Physical Therapy referral from your doctor.      Low impact activity - cycling or swimming is best; then walking or elliptical.  Avoid running, jumping, and hard landings.      Cortisone injection or Voltaren Rx if needed      If not improved, standing x-rays

## 2023-10-09 ENCOUNTER — TELEPHONE (OUTPATIENT)
Dept: PULMONOLOGY | Facility: CLINIC | Age: 63
End: 2023-10-09
Payer: COMMERCIAL

## 2023-10-09 DIAGNOSIS — J45.30 MILD PERSISTENT ASTHMA WITHOUT COMPLICATION: ICD-10-CM

## 2023-10-09 RX ORDER — FLUTICASONE PROPIONATE AND SALMETEROL XINAFOATE 115; 21 UG/1; UG/1
2 AEROSOL, METERED RESPIRATORY (INHALATION) 2 TIMES DAILY
Qty: 12 G | Refills: 5 | Status: SHIPPED | OUTPATIENT
Start: 2023-10-09 | End: 2024-01-30

## 2023-10-09 NOTE — TELEPHONE ENCOUNTER
"Requested Prescriptions   Pending Prescriptions Disp Refills    fluticasone-salmeterol (ADVAIR HFA) 115-21 MCG/ACT inhaler 12 g 3     Sig: Inhale 2 puffs into the lungs 2 times daily       Inhaled Steroids Protocol Failed - 10/9/2023  9:17 AM        Failed - Asthma control assessment score within normal limits in last 6 months     Please review ACT score.           Failed - Recent (6 mo) or future (30 days) visit within the authorizing provider's specialty     Patient had office visit in the last 6 months or has a visit in the next 30 days with authorizing provider or within the authorizing provider's specialty.  See \"Patient Info\" tab in inbasket, or \"Choose Columns\" in Meds & Orders section of the refill encounter.            Passed - Patient is age 12 or older        Passed - Medication is active on med list       Long-Acting Beta Agonist Inhalers Protocol  Failed - 10/9/2023  9:17 AM        Failed - Asthma control assessment score within normal limits in last 6 months     Please review ACT score.           Failed - Recent (6 mo) or future (30 days) visit within the authorizing provider's specialty     Patient had office visit in the last 6 months or has a visit in the next 30 days with authorizing provider or within the authorizing provider's specialty.  See \"Patient Info\" tab in inbasket, or \"Choose Columns\" in Meds & Orders section of the refill encounter.            Passed - Patient is age 12 or older        Passed - Medication is active on med list           Last Written Prescription Date:  2/21/23  Last Fill Quantity: 12g,  # refills: 3   Last office visit: 2/6/2023 ; last virtual visit: Visit date not found with prescribing provider:  Dr Umanzor   Future Office Visit:  2/2/24    Refill sent  Tanner Colby RN          "

## 2023-10-09 NOTE — TELEPHONE ENCOUNTER
Last Written Prescription Date:  2/21/23  Last Fill Quantity: 12g,  # refills: 3   Last office visit: 2/6/2023 ; last virtual visit: Visit date not found with prescribing provider:Dr mcgraw   Future Office Visit:           Requested Prescriptions   Pending Prescriptions Disp Refills    fluticasone-salmeterol (ADVAIR HFA) 115-21 MCG/ACT inhaler 12 g 3     Sig: Inhale 2 puffs into the lungs 2 times daily       There is no refill protocol information for this order

## 2023-10-09 NOTE — TELEPHONE ENCOUNTER
BROOKLYNN and mert message for PT to call [phone] to reschedule the Dr. Umanzor appt. I did mention there is a hold for this pt on 1/05 at 3:30. please reschedule ok per Riri

## 2023-10-13 ENCOUNTER — TELEPHONE (OUTPATIENT)
Dept: SURGERY | Facility: CLINIC | Age: 63
End: 2023-10-13

## 2023-10-13 ENCOUNTER — MYC MEDICAL ADVICE (OUTPATIENT)
Dept: ENDOCRINOLOGY | Facility: CLINIC | Age: 63
End: 2023-10-13
Payer: COMMERCIAL

## 2023-10-13 DIAGNOSIS — E66.811 CLASS 1 OBESITY WITH SERIOUS COMORBIDITY AND BODY MASS INDEX (BMI) OF 32.0 TO 32.9 IN ADULT, UNSPECIFIED OBESITY TYPE: Primary | ICD-10-CM

## 2023-10-13 RX ORDER — TIRZEPATIDE 2.5 MG/.5ML
2.5 INJECTION, SOLUTION SUBCUTANEOUS
Qty: 2 ML | Refills: 1 | Status: SHIPPED | OUTPATIENT
Start: 2023-10-13 | End: 2023-12-06

## 2023-10-13 NOTE — TELEPHONE ENCOUNTER
PA Initiation    Medication: MOUNJARO 2.5 MG/0.5ML SC SOPN  Insurance Company: [employer] - Phone [phone] Fax [phone]  Pharmacy Filling the Rx:    Filling Pharmacy Phone:    Filling Pharmacy Fax:    Start Date: 10/13/2023

## 2023-10-16 NOTE — TELEPHONE ENCOUNTER
PRIOR AUTHORIZATION DENIED    Medication: MOUNJARO 2.5 MG/0.5ML SC SOPN  Insurance Company: [employer] - Phone [phone] Fax [phone]  Denial Date: 10/13/2023  Denial Rational:   Appeal Information:   Patient Notified:

## 2023-11-16 ENCOUNTER — TELEPHONE (OUTPATIENT)
Dept: FAMILY MEDICINE | Facility: CLINIC | Age: 63
End: 2023-11-16
Payer: COMMERCIAL

## 2023-11-16 ENCOUNTER — NURSE TRIAGE (OUTPATIENT)
Dept: NURSING | Facility: CLINIC | Age: 63
End: 2023-11-16
Payer: COMMERCIAL

## 2023-11-16 NOTE — TELEPHONE ENCOUNTER
Reason for Call:  Appointment Request    Patient requesting this type of appt:  Headaches    Requested provider:  Any provider    Reason patient unable to be scheduled: Not within requested timeframe    When does patient want to be seen/preferred time:  ASAP    Comments: Patient has been experiencing ongoing headaches for a few weeks and would like to schedule and appointment for asap. Unable to find available appointment before December. Triage nurse offered but denied.     Could we send this information to you in [facility] or would you prefer to receive a phone call?:   Patient would prefer a phone call   Okay to leave a detailed message?: Yes at Cell number on file:    Telephone Information:   Mobile [phone]       Call taken on 11/16/2023 at 10:48 AM by Pati Parker

## 2023-11-16 NOTE — TELEPHONE ENCOUNTER
Spoke with patient.  Last seen at [facility] 2014.    Patient states his PCP is hard to get in to see.  Scheduled patient to see DE 11/28.    Muna Kruger RN

## 2023-11-16 NOTE — TELEPHONE ENCOUNTER
Nurse Triage SBAR    Is this a 2nd Level Triage? NO    Situation: Headaches    Background: Patient states that he has been having headaches for the past 8-10 weeks.  He states that they are dull and not severe. He denies any numbness, weakness, and speech issues.  He denies any recent head injury, denies carbon monoxide exposure.  Denies eye pain, denies any vision changes.  He has been using tylenol and ibuprofen with relief.     Assessment: Mild headaches    Protocol Recommended Disposition:   See in office today or tomorrow    Recommendation: Patient wants to go to the [facility] clinic. He states he does not think he needs to go to the ED or .  Patient has an appointment scheduled for January.   Patient will call back if his symptoms get worse.     N/A    KAVEH KINGSLEY RN    Does the patient meet one of the following criteria for ADS visit consideration? No    Reason for Disposition   New headache and age > 50    Additional Information   Negative: Difficult to awaken or acting confused (e.g., disoriented, slurred speech)   Negative: Weakness of the face, arm or leg on one side of the body and new-onset   Negative: Numbness of the face, arm or leg on one side of the body and new-onset   Negative: Loss of speech or garbled speech and new-onset   Negative: Passed out (i.e., fainted, collapsed and was not responding)   Negative: Sounds like a life-threatening emergency to the triager   Negative: Followed a head injury within last 3 days   Negative: Traumatic Brain Injury (TBI) is suspected   Negative: Sinus pain of forehead and yellow or green nasal discharge   Negative: Pregnant   Negative: Unable to walk without falling   Negative: Stiff neck (can't touch chin to chest)   Negative: Possibility of carbon monoxide exposure   Negative: SEVERE headache, states 'worst headache' of life   Negative: SEVERE headache, sudden-onset (i.e., reaching maximum intensity within seconds to 1 hour)   Negative: Severe pain in  one eye   Negative: Loss of vision or double vision  (Exception: Same as prior migraines.)   Negative: Patient sounds very sick or weak to the triager   Negative: Fever > 103 F (39.4 C)   Negative: Fever > 100.0 F (37.8 C) and has diabetes mellitus or a weak immune system (e.g., HIV positive, cancer chemotherapy, organ transplant, splenectomy, chronic steroids)   Negative: SEVERE headache (e.g., excruciating) and has had severe headaches before   Negative: SEVERE headache and not relieved by pain meds   Negative: SEVERE headache and vomiting   Negative: SEVERE headache and fever   Negative: New headache and weak immune system (e.g., HIV positive, cancer chemo, splenectomy, organ transplant, chronic steroids)   Negative: Fever present > 3 days (72 hours)   Negative: Patient wants to be seen   Negative: Unexplained headache that is present > 24 hours    Protocols used: Headache-A-OH

## 2023-12-06 ENCOUNTER — MYC MEDICAL ADVICE (OUTPATIENT)
Dept: ENDOCRINOLOGY | Facility: CLINIC | Age: 63
End: 2023-12-06

## 2023-12-06 ENCOUNTER — VIRTUAL VISIT (OUTPATIENT)
Dept: ENDOCRINOLOGY | Facility: CLINIC | Age: 63
End: 2023-12-06
Payer: COMMERCIAL

## 2023-12-06 VITALS — WEIGHT: 242 LBS | BODY MASS INDEX: 32.78 KG/M2 | HEIGHT: 72 IN

## 2023-12-06 DIAGNOSIS — E66.811 CLASS 1 OBESITY WITH SERIOUS COMORBIDITY AND BODY MASS INDEX (BMI) OF 32.0 TO 32.9 IN ADULT, UNSPECIFIED OBESITY TYPE: ICD-10-CM

## 2023-12-06 DIAGNOSIS — R11.0 NAUSEA: Primary | ICD-10-CM

## 2023-12-06 PROCEDURE — 99213 OFFICE O/P EST LOW 20 MIN: CPT | Mod: VID | Performed by: PHYSICIAN ASSISTANT

## 2023-12-06 ASSESSMENT — PAIN SCALES - GENERAL: PAINLEVEL: NO PAIN (0)

## 2023-12-06 NOTE — LETTER
2023       RE: Speedy Carlson  [address] [facility] [place] [address]     Dear Colleague,    Thank you for referring your patient, Speedy Carlson, to the [facility]NAGEMENT [facility] [place] at [facility]. Please see a copy of my visit note below.      Return Medical Weight Management Note     Speedy Carlson  MRN:  [id number]  :  1960  CAMILO:  2023    Dear Steve Mcmahon MD,    I had the pleasure of seeing your patient Speedy Carlson. He is a 63 year old male who I am continuing to see for treatment of obesity related to:        2019    10:06 AM   --   I have the following health issues associated with obesity Heart Disease    High Blood Pressure    High Cholesterol    Sleep Apnea   I have the following symptoms associated with obesity None of the above       Assessment & Plan  Problem List Items Addressed This Visit    None  Visit Diagnoses       Class 1 obesity with serious comorbidity and body mass index (BMI) of 32.0 to 32.9 in adult, unspecified obesity type        Relevant Medications    tirzepatide-Weight Management (ZEPBOUND) 5 MG/0.5ML prefilled pen           Weight mgmt follow up  Taking mounjaro 2.5mg paying out of pocket  Switch to Zepbound, use coupon     INTERVAL HISTORY:    Anti-obesity medication history    Current:   Mounjaro 2.5mg    CURRENT WEIGHT:   242 lbs 0 oz    Initial Weight (lbs): 252.3 lbs  Last Visits Weight: 109.8 kg (242 lb)  Cumulative weight loss (lbs): 10.3  Weight Loss Percentage: 4.08%        2023     9:57 AM   Changes and Difficulties   I have made the following changes to my diet since my last visit: Fewer calories since starting mounjaro   With regards to my diet, I am still struggling with: Sugar   I have made the following changes to my activity/exercise since my last visit: No changes   With regards to my activity/exercise, I am still struggling with: Motivation          MEDICATIONS:   Current Outpatient Medications   Medication Sig Dispense Refill    tirzepatide-Weight Management (ZEPBOUND) 5 MG/0.5ML prefilled pen Inject 0.5 mLs (5 mg) Subcutaneous every 7 days 2 mL 1    albuterol (PROAIR HFA/PROVENTIL HFA/VENTOLIN HFA) 108 (90 Base) MCG/ACT inhaler Inhale 1-2 puffs into the lungs every 4 hours as needed       amLODIPine (NORVASC) 5 MG tablet Take 5 mg by mouth every morning       fluticasone-salmeterol (ADVAIR HFA) 115-21 MCG/ACT inhaler Inhale 2 puffs into the lungs 2 times daily 12 g 5    montelukast (SINGULAIR) 10 MG tablet TAKE 1 TABLET(10 MG) BY MOUTH AT BEDTIME 30 tablet 1    pravastatin (PRAVACHOL) 40 MG tablet Take 40 mg by mouth every morning       traZODone (DESYREL) 50 MG tablet Take 150 mg by mouth At Bedtime       zolpidem (AMBIEN) 5 MG tablet Take 5 mg by mouth nightly as needed for sleep              11/30/2023     9:57 AM   Weight Loss Medication History Reviewed With Patient   Are you having any side effects from the weight loss medication that we have prescribed you? No       Allied Health/Nurse Visit on 09/14/2022   Component Date Value Ref Range Status    FVC-Pred 09/14/2022 4.97  L Final    FVC-Pre 09/14/2022 5.14  L Final    FVC-%Pred-Pre 09/14/2022 103  % Final    FEV1-Pre 09/14/2022 4.07  L Final    FEV1-%Pred-Pre 09/14/2022 106  % Final    FEV1FVC-Pred 09/14/2022 77  % Final    FEV1FVC-Pre 09/14/2022 79  % Final    FEFMax-Pred 09/14/2022 9.68  L/sec Final    FEFMax-Pre 09/14/2022 9.96  L/sec Final    FEFMax-%Pred-Pre 09/14/2022 102  % Final    FEF2575-Pred 09/14/2022 3.14  L/sec Final    FEF2575-Pre 09/14/2022 3.75  L/sec Final    VHU0689-%Pred-Pre 09/14/2022 119  % Final    ExpTime-Pre 09/14/2022 3.42  sec Final    FIFMax-Pre 09/14/2022 5.39  L/sec Final    VC-Pred 09/14/2022 5.34  L Final    VC-Pre 09/14/2022 5.51  L Final    VC-%Pred-Pre 09/14/2022 103  % Final    IC-Pred 09/14/2022 4.32  L Final    IC-Pre 09/14/2022 4.72  L Final     "IC-%Pred-Pre 09/14/2022 109  % Final    ERV-Pred 09/14/2022 1.02  L Final    ERV-Pre 09/14/2022 0.78  L Final    ERV-%Pred-Pre 09/14/2022 76  % Final    FEV1FEV6-Pred 09/14/2022 79  % Final    FEV1FEV6-Pre 09/14/2022 80  % Final    FRCPleth-Pred 09/14/2022 3.73  L Final    FRCPleth-Pre 09/14/2022 3.56  L Final    FRCPleth-%Pred-Pre 09/14/2022 95  % Final    RVPleth-Pred 09/14/2022 2.49  L Final    RVPleth-Pre 09/14/2022 2.77  L Final    RVPleth-%Pred-Pre 09/14/2022 111  % Final    TLCPleth-Pred 09/14/2022 7.53  L Final    TLCPleth-Pre 09/14/2022 8.28  L Final    TLCPleth-%Pred-Pre 09/14/2022 109  % Final    DLCOunc-Pred 09/14/2022 29.36  ml/min/mmHg Final    DLCOunc-Pre 09/14/2022 30.73  ml/min/mmHg Final    DLCOunc-%Pred-Pre 09/14/2022 104  % Final    VA-Pre 09/14/2022 7.48  L Final    VA-%Pred-Pre 09/14/2022 106  % Final    FEV1SVC-Pred 09/14/2022 72  % Final    FEV1SVC-Pre 09/14/2022 74  % Final         PHYSICAL EXAM:  Objective   Ht 1.829 m (6' 0.01\")   Wt 109.8 kg (242 lb)   BMI 32.81 kg/m      Vitals - Patient Reported  Pain Score: No Pain (0)        GENERAL: Healthy, alert and no distress  EYES: Eyes grossly normal to inspection.  No discharge or erythema, or obvious scleral/conjunctival abnormalities.  RESP: No audible wheeze, cough, or visible cyanosis.  No visible retractions or increased work of breathing.    SKIN: Visible skin clear. No significant rash, abnormal pigmentation or lesions.  NEURO: Cranial nerves grossly intact.  Mentation and speech appropriate for age.  PSYCH: Mentation appears normal, affect normal/bright, judgement and insight intact, normal speech and appearance well-groomed.        Sincerely,    Mary Childers PA-C      20 minutes spent by me on the date of the encounter doing chart review, history and exam, documentation and further activities per the note        Virtual Visit Details    Type of service:  Video Visit     Originating Location (pt. Location): Home    Distant " Location (provider location):  Off-site  Platform used for Video Visit: Melissa

## 2023-12-06 NOTE — PROGRESS NOTES
Return Medical Weight Management Note     Speedy Carlson  MRN:  [id number]  :  1960  CAMILO:  2023    Dear Steve Mcmahon MD,    I had the pleasure of seeing your patient Speedy Carlson. He is a 63 year old male who I am continuing to see for treatment of obesity related to:        2019    10:06 AM   --   I have the following health issues associated with obesity Heart Disease    High Blood Pressure    High Cholesterol    Sleep Apnea   I have the following symptoms associated with obesity None of the above       Assessment & Plan   Problem List Items Addressed This Visit    None  Visit Diagnoses       Class 1 obesity with serious comorbidity and body mass index (BMI) of 32.0 to 32.9 in adult, unspecified obesity type        Relevant Medications    tirzepatide-Weight Management (ZEPBOUND) 5 MG/0.5ML prefilled pen           Weight mgmt follow up  Taking mounjaro 2.5mg paying out of pocket  Switch to Zepbound, use coupon     INTERVAL HISTORY:    Anti-obesity medication history    Current:   Mounjaro 2.5mg    CURRENT WEIGHT:   242 lbs 0 oz    Initial Weight (lbs): 252.3 lbs  Last Visits Weight: 109.8 kg (242 lb)  Cumulative weight loss (lbs): 10.3  Weight Loss Percentage: 4.08%        2023     9:57 AM   Changes and Difficulties   I have made the following changes to my diet since my last visit: Fewer calories since starting mounjaro   With regards to my diet, I am still struggling with: Sugar   I have made the following changes to my activity/exercise since my last visit: No changes   With regards to my activity/exercise, I am still struggling with: Motivation         MEDICATIONS:   Current Outpatient Medications   Medication Sig Dispense Refill    tirzepatide-Weight Management (ZEPBOUND) 5 MG/0.5ML prefilled pen Inject 0.5 mLs (5 mg) Subcutaneous every 7 days 2 mL 1    albuterol (PROAIR HFA/PROVENTIL HFA/VENTOLIN HFA) 108 (90 Base) MCG/ACT inhaler Inhale 1-2 puffs into the lungs every 4  hours as needed       amLODIPine (NORVASC) 5 MG tablet Take 5 mg by mouth every morning       fluticasone-salmeterol (ADVAIR HFA) 115-21 MCG/ACT inhaler Inhale 2 puffs into the lungs 2 times daily 12 g 5    montelukast (SINGULAIR) 10 MG tablet TAKE 1 TABLET(10 MG) BY MOUTH AT BEDTIME 30 tablet 1    pravastatin (PRAVACHOL) 40 MG tablet Take 40 mg by mouth every morning       traZODone (DESYREL) 50 MG tablet Take 150 mg by mouth At Bedtime       zolpidem (AMBIEN) 5 MG tablet Take 5 mg by mouth nightly as needed for sleep              11/30/2023     9:57 AM   Weight Loss Medication History Reviewed With Patient   Are you having any side effects from the weight loss medication that we have prescribed you? No       Allied Health/Nurse Visit on 09/14/2022   Component Date Value Ref Range Status    FVC-Pred 09/14/2022 4.97  L Final    FVC-Pre 09/14/2022 5.14  L Final    FVC-%Pred-Pre 09/14/2022 103  % Final    FEV1-Pre 09/14/2022 4.07  L Final    FEV1-%Pred-Pre 09/14/2022 106  % Final    FEV1FVC-Pred 09/14/2022 77  % Final    FEV1FVC-Pre 09/14/2022 79  % Final    FEFMax-Pred 09/14/2022 9.68  L/sec Final    FEFMax-Pre 09/14/2022 9.96  L/sec Final    FEFMax-%Pred-Pre 09/14/2022 102  % Final    FEF2575-Pred 09/14/2022 3.14  L/sec Final    FEF2575-Pre 09/14/2022 3.75  L/sec Final    AGB4101-%Pred-Pre 09/14/2022 119  % Final    ExpTime-Pre 09/14/2022 3.42  sec Final    FIFMax-Pre 09/14/2022 5.39  L/sec Final    VC-Pred 09/14/2022 5.34  L Final    VC-Pre 09/14/2022 5.51  L Final    VC-%Pred-Pre 09/14/2022 103  % Final    IC-Pred 09/14/2022 4.32  L Final    IC-Pre 09/14/2022 4.72  L Final    IC-%Pred-Pre 09/14/2022 109  % Final    ERV-Pred 09/14/2022 1.02  L Final    ERV-Pre 09/14/2022 0.78  L Final    ERV-%Pred-Pre 09/14/2022 76  % Final    FEV1FEV6-Pred 09/14/2022 79  % Final    FEV1FEV6-Pre 09/14/2022 80  % Final    FRCPleth-Pred 09/14/2022 3.73  L Final    FRCPleth-Pre 09/14/2022 3.56  L Final    FRCPleth-%Pred-Pre 09/14/2022  "95  % Final    RVPleth-Pred 09/14/2022 2.49  L Final    RVPleth-Pre 09/14/2022 2.77  L Final    RVPleth-%Pred-Pre 09/14/2022 111  % Final    TLCPleth-Pred 09/14/2022 7.53  L Final    TLCPleth-Pre 09/14/2022 8.28  L Final    TLCPleth-%Pred-Pre 09/14/2022 109  % Final    DLCOunc-Pred 09/14/2022 29.36  ml/min/mmHg Final    DLCOunc-Pre 09/14/2022 30.73  ml/min/mmHg Final    DLCOunc-%Pred-Pre 09/14/2022 104  % Final    VA-Pre 09/14/2022 7.48  L Final    VA-%Pred-Pre 09/14/2022 106  % Final    FEV1SVC-Pred 09/14/2022 72  % Final    FEV1SVC-Pre 09/14/2022 74  % Final         PHYSICAL EXAM:  Objective    Ht 1.829 m (6' 0.01\")   Wt 109.8 kg (242 lb)   BMI 32.81 kg/m      Vitals - Patient Reported  Pain Score: No Pain (0)        GENERAL: Healthy, alert and no distress  EYES: Eyes grossly normal to inspection.  No discharge or erythema, or obvious scleral/conjunctival abnormalities.  RESP: No audible wheeze, cough, or visible cyanosis.  No visible retractions or increased work of breathing.    SKIN: Visible skin clear. No significant rash, abnormal pigmentation or lesions.  NEURO: Cranial nerves grossly intact.  Mentation and speech appropriate for age.  PSYCH: Mentation appears normal, affect normal/bright, judgement and insight intact, normal speech and appearance well-groomed.        Sincerely,    Mary Childers PA-C      20 minutes spent by me on the date of the encounter doing chart review, history and exam, documentation and further activities per the note        Virtual Visit Details    Type of service:  Video Visit     Originating Location (pt. Location): Home    Distant Location (provider location):  Off-site  Platform used for Video Visit: Melissa"

## 2023-12-06 NOTE — NURSING NOTE
Is the patient currently in the state of [place]? YES    Visit mode:VIDEO    If the visit is dropped, the patient can be reconnected by: VIDEO VISIT: Send to e-mail at: [email or web address]    Will anyone else be joining the visit? NO  (If patient encounters technical issues they should call [phone]    How would you like to obtain your AVS? MyChart    Are changes needed to the allergy or medication list? Pt stated no changes to allergies and Pt stated no med changes    Reason for visit: Follow Up    Laura SOLER

## 2023-12-07 ENCOUNTER — TELEPHONE (OUTPATIENT)
Dept: SURGERY | Facility: CLINIC | Age: 63
End: 2023-12-07
Payer: COMMERCIAL

## 2023-12-08 NOTE — TELEPHONE ENCOUNTER
Prior Authorization Approval    Medication: ZEPBOUND 5 MG/0.5ML SC SOAJ  Authorization Effective Date: 11/6/2023  Authorization Expiration Date: 8/2/2024  Approved Dose/Quantity: 2  Reference #:     Insurance Company: MEDICA - Phone [phone] Fax [phone]  Expected CoPay: $    CoPay Card Available:      Financial Assistance Needed:    Which Pharmacy is filling the prescription:    Pharmacy Notified: yes  Patient Notified: yes

## 2023-12-15 RX ORDER — ONDANSETRON 4 MG/1
4 TABLET, ORALLY DISINTEGRATING ORAL EVERY 8 HOURS PRN
Qty: 30 TABLET | Refills: 0 | Status: SHIPPED | OUTPATIENT
Start: 2023-12-15 | End: 2024-04-03

## 2024-01-22 ENCOUNTER — TELEPHONE (OUTPATIENT)
Dept: SURGERY | Facility: CLINIC | Age: 64
End: 2024-01-22
Payer: COMMERCIAL

## 2024-01-22 DIAGNOSIS — E66.811 CLASS 1 OBESITY WITH SERIOUS COMORBIDITY AND BODY MASS INDEX (BMI) OF 32.0 TO 32.9 IN ADULT, UNSPECIFIED OBESITY TYPE: Primary | ICD-10-CM

## 2024-01-22 DIAGNOSIS — E66.811 CLASS 1 OBESITY WITH SERIOUS COMORBIDITY AND BODY MASS INDEX (BMI) OF 32.0 TO 32.9 IN ADULT, UNSPECIFIED OBESITY TYPE: ICD-10-CM

## 2024-01-22 NOTE — TELEPHONE ENCOUNTER
PRIOR AUTHORIZATION DENIED    Medication: ZEPBOUND 7.5 MG/0.5ML SC SOAJ  Insurance Company: MEDICA - Phone [phone] Fax [phone]  Denial Date: 1/22/2024  Denial Reason(s): Plan/benefit exclusion  Appeal Information: none  Patient Notified:     Pt's formulary changed as of 1/1/2024 and no longer covered zepbound.  Would you like me to shred rx or send to pharmacy if pt wants to pay OOP

## 2024-01-26 RX ORDER — TIRZEPATIDE 5 MG/.5ML
INJECTION, SOLUTION SUBCUTANEOUS
Qty: 2 ML | Refills: 1 | OUTPATIENT
Start: 2024-01-26

## 2024-01-30 ENCOUNTER — TELEPHONE (OUTPATIENT)
Dept: PULMONOLOGY | Facility: CLINIC | Age: 64
End: 2024-01-30

## 2024-01-30 ENCOUNTER — VIRTUAL VISIT (OUTPATIENT)
Dept: PULMONOLOGY | Facility: CLINIC | Age: 64
End: 2024-01-30
Payer: COMMERCIAL

## 2024-01-30 DIAGNOSIS — J45.30 MILD PERSISTENT ASTHMA WITHOUT COMPLICATION: ICD-10-CM

## 2024-01-30 DIAGNOSIS — J45.20 MILD INTERMITTENT ASTHMA WITHOUT COMPLICATION: ICD-10-CM

## 2024-01-30 PROCEDURE — 99214 OFFICE O/P EST MOD 30 MIN: CPT | Mod: 95 | Performed by: PHYSICIAN ASSISTANT

## 2024-01-30 RX ORDER — FLUTICASONE PROPIONATE AND SALMETEROL XINAFOATE 115; 21 UG/1; UG/1
2 AEROSOL, METERED RESPIRATORY (INHALATION) 2 TIMES DAILY
Qty: 12 G | Refills: 5 | Status: SHIPPED | OUTPATIENT
Start: 2024-01-30

## 2024-01-30 RX ORDER — MONTELUKAST SODIUM 10 MG/1
1 TABLET ORAL AT BEDTIME
Qty: 90 TABLET | Refills: 3 | Status: SHIPPED | OUTPATIENT
Start: 2024-01-30

## 2024-01-30 RX ORDER — ALBUTEROL SULFATE 90 UG/1
1-2 AEROSOL, METERED RESPIRATORY (INHALATION) EVERY 4 HOURS PRN
Qty: 18 G | Refills: 11 | Status: SHIPPED | OUTPATIENT
Start: 2024-01-30

## 2024-01-30 ASSESSMENT — PAIN SCALES - GENERAL: PAINLEVEL: NO PAIN (0)

## 2024-01-30 NOTE — LETTER
1/30/2024         RE: Speedy Carlson  [address] [facility] [place] [address]        Dear Colleague,    Thank you for referring your patient, Speedy Carlson, to the [facility] SPECIALTY CLINIC [place]. Please see a copy of my visit note below.    Virtual Visit Details    Type of service:  Video Visit     Originating Location (pt. Location): Home/work    Distant Location (provider location):  Off-site  Platform used for Video Visit: [employer]  9:05-9:12am    Pulmonary Clinic Note    Date of Service: 01/30/24    Chief Complaint   Patient presents with    RECHECK       A/P:  63M HTN, HLD, CAD, HANNAH being seen for mild-intermittent asthma f/u. He feels improved and would like to continue current management.     - continue ICS-LABA (Advair) as needed, rinse mouth out after use  - advised to return to twice daily dosing if SOB worsens   - continue montelukast  - continue albuterol as needed  - OK to substitute medications based on formulary     History:  62M HTN, HLD, CAD, HANNAH being seen for asthma f/u. Last seen 2/2023 with Dr. Umanzor.  Has been doing very well and feels like his asthma is improving. No URIs or respiratory illnesses over the past year. Using Advair once daily (not twice) and montelukast. Forgets his Advair frequently, using it 4-5x per week. Using albuterol a few times per week, does help. Montelukast 10mg at bedtime has been helpful, takes this regularly. TEIXEIRA seems to be most present when he is out doing things. No SOB at rest. Rare wheezing. Improved cough. No nocturnal cough. No orthopnea or PND. Follows at the weight loss clinic which has been really helpful. He has lost about 18lb in 4 months.     Smoking: never                            Bird exposure: no             Animal exposure: 2 dogs        Inhalation exposure: no    Occupation: works for an [employer] firm                        10 point review of systems negative, aside from that mentioned in HPI.    There were no vitals  taken for this visit.  Gen: well-appearing, NAD  HEENT: normocephalic, atraumatic  Resp: Normal respiratory effort on room air.   Skin: no obvious rashes on gross evaluation  Neuro: alert and appropriate, no focal abnormalities     Labs:  Personally reviewed  Abs eos (12/2022) - 300    Imaging/Studies: see note 2/2023 for PFTs and imaging, not reviewed today    Past Medical History:   Diagnosis Date    Acute prostatitis 12/2004    Calculus of kidney 1994    no recurrence    Cervical radiculopathy 10/04/2016    R side    Class 1 obesity due to excess calories without serious comorbidity with body mass index (BMI) of 31.0 to 31.9 in adult 07/06/2020    Coronary artery disease due to lipid rich plaque     coronary arteryt calcium score of 22, 63rd percentile in 2016    Dry eye syndrome     Hypertension     Idiopathic urticaria     Impotence of organic origin     Insomnia     Mild intermittent asthma 1975    hx in childhood    Mixed hyperlipidemia     Obesity     HANNAH (obstructive sleep apnea)     Other acne     Prostatitis     Seborrhea capitis     Tinnitus     Varicella without mention of complication      Past Surgical History:   Procedure Laterality Date    COLONOSCOPY  2012    COLONOSCOPY N/A 4/28/2022    Procedure: COLONOSCOPY;  Surgeon: Echo Caldera MD;  Location:  GI    DENTAL SURGERY  1980    wisdom teeth    ENDOSCOPY DRUG INDUCED SLEEP N/A 6/3/2020    Procedure: DRUG-INDUCED SLEEP ENDOSCOPY;  Surgeon: Gladys Marroquin MD;  Location:  OR    IMPLANT GENERATOR STIMULATOR (LOCATION) Right 10/28/2020    Procedure: INSERTION, PULSE GENERATOR, NEUROSTIMULATOR;  Surgeon: Gladys Marroquin MD;  Location: [facility] OR    PHOTOREFRACTIVE KERATECTOMY  2000    s/p Lasik surgery    TONSILLECTOMY  1971    s/p Tonsillectomy     Family History   Problem Relation Age of Onset    Cancer Father         lung, smoker    Breast Cancer Mother         at age  45    Depression Mother     Obesity Mother      Eye Disorder Mother         cataracts    Hyperlipidemia Mother     Hypertension Mother     Coronary Artery Disease Mother         First MI betweeen 60 and 66 year of age, 3 MIs in past 15 years    Pulmonary Embolism Mother         on Eliquis    Coronary Artery Disease Brother         MI at age 30, pericarditis then MI    Coronary Artery Disease Brother         CABG x 4 at age 48, smoker    Hyperlipidemia Brother     Obesity Brother         Has lost over 100 pounds     Social History     Socioeconomic History    Marital status:      Spouse name: Acosta Zuleta    Number of children: 0    Years of education: 18    Highest education level: Not on file   Occupational History    Occupation: Research Analyst     Employer: [employer]    Occupation: Consultant     Comment: self-employed   Tobacco Use    Smoking status: Never    Smokeless tobacco: Never    Tobacco comments:     cigar occ   Substance and Sexual Activity    Alcohol use: Yes     Comment: 2-4 beers per week    Drug use: No    Sexual activity: Yes     Partners: Female     Birth control/protection: Pill   Other Topics Concern    Not on file   Social History Narrative    Social Documentation:        Balanced Diet: YES    Calcium intake: 1-2 per day    Caffeine: 1 cups coffee per day-during week    Exercise:  type of activity walk;  3 days / week  exercycle, treadmill    Sunscreen: Yes    Seatbelts:  Yes    Self Testicular Exam: Yes    Physical/Emotional/Sexual Abuse: No     Do you feel safe in your environment? Yes        Cholesterol screen up to date: No-Discussed    Eye Exam up to date: Yes    Dental Exam up to date: Yes    Dexa Scan up to date: Does Not Apply    Colonoscopy up to date: Does Not Apply    Immunizations up to date: Yes     Glucose screen if over 40:  No- Discussed        Brittany Canada MA     Social Determinants of Health     Financial Resource Strain: Not on file   Food Insecurity: Not on file   Transportation Needs: Not on  file   Physical Activity: Not on file   Stress: Not on file   Social Connections: Not on file   Interpersonal Safety: Not on file   Housing Stability: Not on file       35 minutes spent reviewing chart, reviewing test results, talking with and examining patient, formulating plan, and documentation on the day of the encounter.    Cecilia Grigsby PA-C  General Pulmonology

## 2024-01-30 NOTE — PROGRESS NOTES
Virtual Visit Details    Type of service:  Video Visit     Originating Location (pt. Location): Home/work    Distant Location (provider location):  Off-site  Platform used for Video Visit: Melissa  9:05-9:12am    Pulmonary Clinic Note    Date of Service: 01/30/24    Chief Complaint   Patient presents with    RECHECK       A/P:  63M HTN, HLD, CAD, HANNAH being seen for mild-intermittent asthma f/u. He feels improved and would like to continue current management.     - continue ICS-LABA (Advair) as needed, rinse mouth out after use  - advised to return to twice daily dosing if SOB worsens   - continue montelukast  - continue albuterol as needed  - OK to substitute medications based on formulary     History:  62M HTN, HLD, CAD, HANNAH being seen for asthma f/u. Last seen 2/2023 with Dr. Umanzor.  Has been doing very well and feels like his asthma is improving. No URIs or respiratory illnesses over the past year. Using Advair once daily (not twice) and montelukast. Forgets his Advair frequently, using it 4-5x per week. Using albuterol a few times per week, does help. Montelukast 10mg at bedtime has been helpful, takes this regularly. TEIXEIRA seems to be most present when he is out doing things. No SOB at rest. Rare wheezing. Improved cough. No nocturnal cough. No orthopnea or PND. Follows at the weight loss clinic which has been really helpful. He has lost about 18lb in 4 months.     Smoking: never                            Bird exposure: no             Animal exposure: 2 dogs        Inhalation exposure: no    Occupation: works for an [employer] firm                        10 point review of systems negative, aside from that mentioned in HPI.    There were no vitals taken for this visit.  Gen: well-appearing, NAD  HEENT: normocephalic, atraumatic  Resp: Normal respiratory effort on room air.   Skin: no obvious rashes on gross evaluation  Neuro: alert and appropriate, no focal abnormalities     Labs:  Personally reviewed  Abs eos  (12/2022) - 300    Imaging/Studies: see note 2/2023 for PFTs and imaging, not reviewed today    Past Medical History:   Diagnosis Date    Acute prostatitis 12/2004    Calculus of kidney 1994    no recurrence    Cervical radiculopathy 10/04/2016    R side    Class 1 obesity due to excess calories without serious comorbidity with body mass index (BMI) of 31.0 to 31.9 in adult 07/06/2020    Coronary artery disease due to lipid rich plaque     coronary arteryt calcium score of 22, 63rd percentile in 2016    Dry eye syndrome     Hypertension     Idiopathic urticaria     Impotence of organic origin     Insomnia     Mild intermittent asthma 1975    hx in childhood    Mixed hyperlipidemia     Obesity     HANNAH (obstructive sleep apnea)     Other acne     Prostatitis     Seborrhea capitis     Tinnitus     Varicella without mention of complication      Past Surgical History:   Procedure Laterality Date    COLONOSCOPY  2012    COLONOSCOPY N/A 4/28/2022    Procedure: COLONOSCOPY;  Surgeon: Echo Caldera MD;  Location:  GI    DENTAL SURGERY  1980    wisdom teeth    ENDOSCOPY DRUG INDUCED SLEEP N/A 6/3/2020    Procedure: DRUG-INDUCED SLEEP ENDOSCOPY;  Surgeon: Gladys Marroquin MD;  Location:  OR    IMPLANT GENERATOR STIMULATOR (LOCATION) Right 10/28/2020    Procedure: INSERTION, PULSE GENERATOR, NEUROSTIMULATOR;  Surgeon: Gladys Marroquin MD;  [place] [facility] [place]    PHOTOREFRACTIVE KERATECTOMY  2000    s/p Lasik surgery    TONSILLECTOMY  1971    s/p Tonsillectomy     Family History   Problem Relation Age of Onset    Cancer Father         lung, smoker    Breast Cancer Mother         at age  45    Depression Mother     Obesity Mother     Eye Disorder Mother         cataracts    Hyperlipidemia Mother     Hypertension Mother     Coronary Artery Disease Mother         First MI betweeen 61 and 66 year of age, 3 MIs in past 15 years    Pulmonary Embolism Mother         on Eliquis    Coronary Artery  Disease Brother         MI at age 30, pericarditis then MI    Coronary Artery Disease Brother         CABG x 4 at age 48, smoker    Hyperlipidemia Brother     Obesity Brother         Has lost over 100 pounds     Social History     Socioeconomic History    Marital status:      Spouse name: Acosta Zuleta    Number of children: 0    Years of education: 18    Highest education level: Not on file   Occupational History    Occupation: Research Analyst     Employer: [employer]    Occupation: Consultant     Comment: self-employed   Tobacco Use    Smoking status: Never    Smokeless tobacco: Never    Tobacco comments:     cigar occ   Substance and Sexual Activity    Alcohol use: Yes     Comment: 2-4 beers per week    Drug use: No    Sexual activity: Yes     Partners: Female     Birth control/protection: Pill   Other Topics Concern    Not on file   Social History Narrative    Social Documentation:        Balanced Diet: YES    Calcium intake: 1-2 per day    Caffeine: 1 cups coffee per day-during week    Exercise:  type of activity walk;  3 days / week  exercycle, treadmill    Sunscreen: Yes    Seatbelts:  Yes    Self Testicular Exam: Yes    Physical/Emotional/Sexual Abuse: No     Do you feel safe in your environment? Yes        Cholesterol screen up to date: No-Discussed    Eye Exam up to date: Yes    Dental Exam up to date: Yes    Dexa Scan up to date: Does Not Apply    Colonoscopy up to date: Does Not Apply    Immunizations up to date: Yes     Glucose screen if over 40:  No- Discussed        Brittany Canada MA     Social Determinants of Health     Financial Resource Strain: Not on file   Food Insecurity: Not on file   Transportation Needs: Not on file   Physical Activity: Not on file   Stress: Not on file   Social Connections: Not on file   Interpersonal Safety: Not on file   Housing Stability: Not on file       35 minutes spent reviewing chart, reviewing test results, talking with and examining patient,  formulating plan, and documentation on the day of the encounter.    Cecilia Grigsby PA-C  General Pulmonology

## 2024-01-30 NOTE — TELEPHONE ENCOUNTER
Prior Authorization Specialty Medication Request    Medication/Dose: fluticasone-salmeterol (ADVAIR HFA) 115-21 MCG/ACT inhaler  Diagnosis and ICD code (if different than what is on RX):  J45.30       Insurance   Primary: MEDICA - [employer] [facility] [place]   Insurance ID:  [id number]

## 2024-01-30 NOTE — NURSING NOTE
Is the patient currently in the state of [place]? YES    Visit mode:VIDEO    If the visit is dropped, the patient can be reconnected by: VIDEO VISIT: Text to cell phone:   Telephone Information:   Mobile [phone]       Will anyone else be joining the visit? NO  (If patient encounters technical issues they should call [phone]    How would you like to obtain your AVS? MyChart    Are changes needed to the allergy or medication list? Pt stated no changes to allergies and Pt stated no med changes    Reason for visit: OC SOLER

## 2024-02-13 NOTE — TELEPHONE ENCOUNTER
PA Initiation    Medication: FLUTICASONE-SALMETEROL 115-21 MCG/ACT IN AERO  Insurance Company: [employer] Non-Specialty PA's - Phone [phone] Fax [phone]  Pharmacy Filling the Rx: [id number] [place], [place] - [address] [facility] AND [address]  Filling Pharmacy Phone: [phone]  Filling Pharmacy Fax:    Start Date: 2/13/2024  Retail Pharmacy Prior Authorization Team   Phone: [phone]

## 2024-02-13 NOTE — TELEPHONE ENCOUNTER
PRIOR AUTHORIZATION DENIED      MUST USE BRAND NAME ADVAIR    Medication: FLUTICASONE-SALMETEROL 115-21 MCG/ACT IN AERO  Insurance Company: [employer] Non-Specialty PA's - Phone [phone] Fax [phone]  Denial Date: 2/13/2024  Denial Reason(s):     Appeal Information:     Patient Notified: Pharmacy will notify patient.

## 2024-03-19 DIAGNOSIS — R11.0 NAUSEA: ICD-10-CM

## 2024-03-23 ENCOUNTER — HEALTH MAINTENANCE LETTER (OUTPATIENT)
Age: 64
End: 2024-03-23

## 2024-03-25 RX ORDER — ONDANSETRON 4 MG/1
TABLET, ORALLY DISINTEGRATING ORAL
Qty: 30 TABLET | Refills: 0 | OUTPATIENT
Start: 2024-03-25

## 2024-03-25 NOTE — TELEPHONE ENCOUNTER
ONDANSETRON ODT 4MG TABLETS       Last Written Prescription Date:  12-15-23  Last Fill Quantity: 30,   # refills: 0  Last Office Visit : 12-6-23  Future Office visit:  4-3-24    Routing refill request to provider for review/approval because:  Med not on Wt Mgmt protocol

## 2024-03-28 DIAGNOSIS — E66.811 CLASS 1 OBESITY WITH SERIOUS COMORBIDITY AND BODY MASS INDEX (BMI) OF 32.0 TO 32.9 IN ADULT, UNSPECIFIED OBESITY TYPE: ICD-10-CM

## 2024-04-03 ENCOUNTER — VIRTUAL VISIT (OUTPATIENT)
Dept: ENDOCRINOLOGY | Facility: CLINIC | Age: 64
End: 2024-04-03
Payer: COMMERCIAL

## 2024-04-03 VITALS — BODY MASS INDEX: 30.88 KG/M2 | WEIGHT: 228 LBS | HEIGHT: 72 IN

## 2024-04-03 DIAGNOSIS — E66.811 CLASS 1 OBESITY WITH SERIOUS COMORBIDITY AND BODY MASS INDEX (BMI) OF 32.0 TO 32.9 IN ADULT, UNSPECIFIED OBESITY TYPE: ICD-10-CM

## 2024-04-03 PROCEDURE — 99212 OFFICE O/P EST SF 10 MIN: CPT | Mod: 95 | Performed by: PHYSICIAN ASSISTANT

## 2024-04-03 RX ORDER — TIRZEPATIDE 7.5 MG/.5ML
7.5 INJECTION, SOLUTION SUBCUTANEOUS
Qty: 2 ML | Refills: 3 | Status: SHIPPED | OUTPATIENT
Start: 2024-04-03

## 2024-04-03 ASSESSMENT — PAIN SCALES - GENERAL: PAINLEVEL: NO PAIN (0)

## 2024-04-03 NOTE — LETTER
4/3/2024       RE: Speedy Carlson  [address] [facility] [address]     Dear Colleague,    Thank you for referring your patient, Speedy Carlson, to the [facility] [place] [facility]. Please see a copy of my visit note below.      Return Medical Weight Management Note     Speedy Carlson  MRN:  [id number]  :  1960  CAMILO:  4/3/2024    Dear Steve Mcmahon MD,    I had the pleasure of seeing your patient Speedy Carlson. He is a 63 year old male who I am continuing to see for treatment of obesity related to:        2019    10:06 AM   --   I have the following health issues associated with obesity Heart Disease    High Blood Pressure    High Cholesterol    Sleep Apnea   I have the following symptoms associated with obesity None of the above       Assessment & Plan  Problem List Items Addressed This Visit    None  Visit Diagnoses       Class 1 obesity with serious comorbidity and body mass index (BMI) of 32.0 to 32.9 in adult, unspecified obesity type        Relevant Medications    tirzepatide (MOUNJARO) 7.5 MG/0.5ML pen    tirzepatide-Weight Management (ZEPBOUND) 7.5 MG/0.5ML prefilled pen           Weight mgmt follow up  Was taking Zepbound 7.5mg and unable to get refill due to supply  Switched to Mounjaro 7.5mg last week paying out of pocket with coupon $550  Having some nausea 36-48 hrs after injection. Uses zofran occasionally or gas X  Noticing decreased appetite and has lost from 252 to 228 lbs (9% TBW)      Follow up  MTM 6 weeks  Mary 4-5 months return MWM       INTERVAL HISTORY:    Was taking Zepbound 7.5mg and unable to get refill due to supply  Switched to Mounjaro 7.5mg last week paying out of pocket with coupon $550  Having some nausea 36-48 hrs after injection. Uses zofran occasionally or gas X  Noticing decreased appetite  Has lost from 252 to 228 lbs (9% TBW)      Follow up  MTM 6  damon Hernandez 4-5 months return [facility]       Anti-obesity medication history    Current:   Mounjaro      CURRENT WEIGHT:   228 lbs 0 oz    Initial Weight (lbs): 252.3 lbs  Last Visits Weight: 107.8 kg (237 lb 9.4 oz)  Cumulative weight loss (lbs): 24.3  Weight Loss Percentage: 9.63%        4/1/2024    12:59 PM   Changes and Difficulties   I have made the following changes to my diet since my last visit: Reduced calorie intake   With regards to my diet, I am still struggling with: Sugar   I have made the following changes to my activity/exercise since my last visit: No changes   With regards to my activity/exercise, I am still struggling with: Exercise         MEDICATIONS:   Current Outpatient Medications   Medication Sig Dispense Refill    tirzepatide (MOUNJARO) 7.5 MG/0.5ML pen Inject 7.5 mg Subcutaneous every 7 days 2 mL 3    tirzepatide-Weight Management (ZEPBOUND) 7.5 MG/0.5ML prefilled pen Inject 0.5 mLs (7.5 mg) Subcutaneous every 7 days 2 mL 3    albuterol (PROAIR HFA/PROVENTIL HFA/VENTOLIN HFA) 108 (90 Base) MCG/ACT inhaler Inhale 1-2 puffs into the lungs every 4 hours as needed for shortness of breath, wheezing or cough 18 g 11    amLODIPine (NORVASC) 5 MG tablet Take 5 mg by mouth every morning       fluticasone-salmeterol (ADVAIR HFA) 115-21 MCG/ACT inhaler Inhale 2 puffs into the lungs 2 times daily 12 g 5    montelukast (SINGULAIR) 10 MG tablet Take 1 tablet (10 mg) by mouth at bedtime 90 tablet 3    ondansetron (ZOFRAN ODT) 4 MG ODT tab Take 1 tablet (4 mg) by mouth every 8 hours as needed for nausea 15 tablet 0    pravastatin (PRAVACHOL) 40 MG tablet Take 40 mg by mouth every morning       traZODone (DESYREL) 50 MG tablet Take 150 mg by mouth At Bedtime       zolpidem (AMBIEN) 5 MG tablet Take 5 mg by mouth nightly as needed for sleep              4/1/2024    12:59 PM   Weight Loss Medication History Reviewed With Patient   Are you having any side effects from the weight loss medication that we have  prescribed you? No       Allied Health/Nurse Visit on 09/14/2022   Component Date Value Ref Range Status    FVC-Pred 09/14/2022 4.97  L Final    FVC-Pre 09/14/2022 5.14  L Final    FVC-%Pred-Pre 09/14/2022 103  % Final    FEV1-Pre 09/14/2022 4.07  L Final    FEV1-%Pred-Pre 09/14/2022 106  % Final    FEV1FVC-Pred 09/14/2022 77  % Final    FEV1FVC-Pre 09/14/2022 79  % Final    FEFMax-Pred 09/14/2022 9.68  L/sec Final    FEFMax-Pre 09/14/2022 9.96  L/sec Final    FEFMax-%Pred-Pre 09/14/2022 102  % Final    FEF2575-Pred 09/14/2022 3.14  L/sec Final    FEF2575-Pre 09/14/2022 3.75  L/sec Final    NIY3563-%Pred-Pre 09/14/2022 119  % Final    ExpTime-Pre 09/14/2022 3.42  sec Final    FIFMax-Pre 09/14/2022 5.39  L/sec Final    VC-Pred 09/14/2022 5.34  L Final    VC-Pre 09/14/2022 5.51  L Final    VC-%Pred-Pre 09/14/2022 103  % Final    IC-Pred 09/14/2022 4.32  L Final    IC-Pre 09/14/2022 4.72  L Final    IC-%Pred-Pre 09/14/2022 109  % Final    ERV-Pred 09/14/2022 1.02  L Final    ERV-Pre 09/14/2022 0.78  L Final    ERV-%Pred-Pre 09/14/2022 76  % Final    FEV1FEV6-Pred 09/14/2022 79  % Final    FEV1FEV6-Pre 09/14/2022 80  % Final    FRCPleth-Pred 09/14/2022 3.73  L Final    FRCPleth-Pre 09/14/2022 3.56  L Final    FRCPleth-%Pred-Pre 09/14/2022 95  % Final    RVPleth-Pred 09/14/2022 2.49  L Final    RVPleth-Pre 09/14/2022 2.77  L Final    RVPleth-%Pred-Pre 09/14/2022 111  % Final    TLCPleth-Pred 09/14/2022 7.53  L Final    TLCPleth-Pre 09/14/2022 8.28  L Final    TLCPleth-%Pred-Pre 09/14/2022 109  % Final    DLCOunc-Pred 09/14/2022 29.36  ml/min/mmHg Final    DLCOunc-Pre 09/14/2022 30.73  ml/min/mmHg Final    DLCOunc-%Pred-Pre 09/14/2022 104  % Final    VA-Pre 09/14/2022 7.48  L Final    VA-%Pred-Pre 09/14/2022 106  % Final    FEV1SVC-Pred 09/14/2022 72  % Final    FEV1SVC-Pre 09/14/2022 74  % Final         PHYSICAL EXAM:  Objective   Ht 1.829 m (6')   Wt 103.4 kg (228 lb)   BMI 30.92 kg/m      Vitals - Patient  Reported  Pain Score: No Pain (0)        GENERAL: alert and no distress  EYES: Eyes grossly normal to inspection.  No discharge or erythema, or obvious scleral/conjunctival abnormalities.  RESP: No audible wheeze, cough, or visible cyanosis.    SKIN: Visible skin clear. No significant rash, abnormal pigmentation or lesions.  NEURO: Cranial nerves grossly intact.  Mentation and speech appropriate for age.  PSYCH: Appropriate affect, tone, and pace of words        Sincerely,    Mary Childers PA-C      10 minutes spent by me on the date of the encounter doing chart review, history and exam, documentation and further activities per the note      Virtual Visit Details    Type of service:  Video Visit     Originating Location (pt. Location): Home    Distant Location (provider location):  Off-site  Platform used for Video Visit: [employer]

## 2024-04-03 NOTE — PROGRESS NOTES
Return Medical Weight Management Note     Speedy Carlson  MRN:  [id number]  :  1960  CAMILO:  4/3/2024    Dear Steve Mcmahon MD,    I had the pleasure of seeing your patient Speedy Carlson. He is a 63 year old male who I am continuing to see for treatment of obesity related to:        2019    10:06 AM   --   I have the following health issues associated with obesity Heart Disease    High Blood Pressure    High Cholesterol    Sleep Apnea   I have the following symptoms associated with obesity None of the above       Assessment & Plan   Problem List Items Addressed This Visit    None  Visit Diagnoses       Class 1 obesity with serious comorbidity and body mass index (BMI) of 32.0 to 32.9 in adult, unspecified obesity type        Relevant Medications    tirzepatide (MOUNJARO) 7.5 MG/0.5ML pen    tirzepatide-Weight Management (ZEPBOUND) 7.5 MG/0.5ML prefilled pen           Weight mgmt follow up  Was taking Zepbound 7.5mg and unable to get refill due to supply  Switched to Mounjaro 7.5mg last week paying out of pocket with coupon $550  Having some nausea 36-48 hrs after injection. Uses zofran occasionally or gas X  Noticing decreased appetite and has lost from 252 to 228 lbs (9% TBW)      Follow up  MT 6 weeks  Mary 4-5 months return MW       INTERVAL HISTORY:    Was taking Zepbound 7.5mg and unable to get refill due to supply  Switched to Mounjaro 7.5mg last week paying out of pocket with coupon $550  Having some nausea 36-48 hrs after injection. Uses zofran occasionally or gas X  Noticing decreased appetite  Has lost from 252 to 228 lbs (9% TBW)      Follow up  MTM 6 weeks  Mary 4-5 months return MWM       Anti-obesity medication history    Current:   Mounjaro      CURRENT WEIGHT:   228 lbs 0 oz    Initial Weight (lbs): 252.3 lbs  Last Visits Weight: 107.8 kg (237 lb 9.4 oz)  Cumulative weight loss (lbs): 24.3  Weight Loss Percentage: 9.63%        2024    12:59 PM   Changes and Difficulties    I have made the following changes to my diet since my last visit: Reduced calorie intake   With regards to my diet, I am still struggling with: Sugar   I have made the following changes to my activity/exercise since my last visit: No changes   With regards to my activity/exercise, I am still struggling with: Exercise         MEDICATIONS:   Current Outpatient Medications   Medication Sig Dispense Refill    tirzepatide (MOUNJARO) 7.5 MG/0.5ML pen Inject 7.5 mg Subcutaneous every 7 days 2 mL 3    tirzepatide-Weight Management (ZEPBOUND) 7.5 MG/0.5ML prefilled pen Inject 0.5 mLs (7.5 mg) Subcutaneous every 7 days 2 mL 3    albuterol (PROAIR HFA/PROVENTIL HFA/VENTOLIN HFA) 108 (90 Base) MCG/ACT inhaler Inhale 1-2 puffs into the lungs every 4 hours as needed for shortness of breath, wheezing or cough 18 g 11    amLODIPine (NORVASC) 5 MG tablet Take 5 mg by mouth every morning       fluticasone-salmeterol (ADVAIR HFA) 115-21 MCG/ACT inhaler Inhale 2 puffs into the lungs 2 times daily 12 g 5    montelukast (SINGULAIR) 10 MG tablet Take 1 tablet (10 mg) by mouth at bedtime 90 tablet 3    ondansetron (ZOFRAN ODT) 4 MG ODT tab Take 1 tablet (4 mg) by mouth every 8 hours as needed for nausea 15 tablet 0    pravastatin (PRAVACHOL) 40 MG tablet Take 40 mg by mouth every morning       traZODone (DESYREL) 50 MG tablet Take 150 mg by mouth At Bedtime       zolpidem (AMBIEN) 5 MG tablet Take 5 mg by mouth nightly as needed for sleep              4/1/2024    12:59 PM   Weight Loss Medication History Reviewed With Patient   Are you having any side effects from the weight loss medication that we have prescribed you? No       Allied Health/Nurse Visit on 09/14/2022   Component Date Value Ref Range Status    FVC-Pred 09/14/2022 4.97  L Final    FVC-Pre 09/14/2022 5.14  L Final    FVC-%Pred-Pre 09/14/2022 103  % Final    FEV1-Pre 09/14/2022 4.07  L Final    FEV1-%Pred-Pre 09/14/2022 106  % Final    FEV1FVC-Pred 09/14/2022 77  % Final     FEV1FVC-Pre 09/14/2022 79  % Final    FEFMax-Pred 09/14/2022 9.68  L/sec Final    FEFMax-Pre 09/14/2022 9.96  L/sec Final    FEFMax-%Pred-Pre 09/14/2022 102  % Final    FEF2575-Pred 09/14/2022 3.14  L/sec Final    FEF2575-Pre 09/14/2022 3.75  L/sec Final    JVG5637-%Pred-Pre 09/14/2022 119  % Final    ExpTime-Pre 09/14/2022 3.42  sec Final    FIFMax-Pre 09/14/2022 5.39  L/sec Final    VC-Pred 09/14/2022 5.34  L Final    VC-Pre 09/14/2022 5.51  L Final    VC-%Pred-Pre 09/14/2022 103  % Final    IC-Pred 09/14/2022 4.32  L Final    IC-Pre 09/14/2022 4.72  L Final    IC-%Pred-Pre 09/14/2022 109  % Final    ERV-Pred 09/14/2022 1.02  L Final    ERV-Pre 09/14/2022 0.78  L Final    ERV-%Pred-Pre 09/14/2022 76  % Final    FEV1FEV6-Pred 09/14/2022 79  % Final    FEV1FEV6-Pre 09/14/2022 80  % Final    FRCPleth-Pred 09/14/2022 3.73  L Final    FRCPleth-Pre 09/14/2022 3.56  L Final    FRCPleth-%Pred-Pre 09/14/2022 95  % Final    RVPleth-Pred 09/14/2022 2.49  L Final    RVPleth-Pre 09/14/2022 2.77  L Final    RVPleth-%Pred-Pre 09/14/2022 111  % Final    TLCPleth-Pred 09/14/2022 7.53  L Final    TLCPleth-Pre 09/14/2022 8.28  L Final    TLCPleth-%Pred-Pre 09/14/2022 109  % Final    DLCOunc-Pred 09/14/2022 29.36  ml/min/mmHg Final    DLCOunc-Pre 09/14/2022 30.73  ml/min/mmHg Final    DLCOunc-%Pred-Pre 09/14/2022 104  % Final    VA-Pre 09/14/2022 7.48  L Final    VA-%Pred-Pre 09/14/2022 106  % Final    FEV1SVC-Pred 09/14/2022 72  % Final    FEV1SVC-Pre 09/14/2022 74  % Final         PHYSICAL EXAM:  Objective    Ht 1.829 m (6')   Wt 103.4 kg (228 lb)   BMI 30.92 kg/m      Vitals - Patient Reported  Pain Score: No Pain (0)        GENERAL: alert and no distress  EYES: Eyes grossly normal to inspection.  No discharge or erythema, or obvious scleral/conjunctival abnormalities.  RESP: No audible wheeze, cough, or visible cyanosis.    SKIN: Visible skin clear. No significant rash, abnormal pigmentation or lesions.  NEURO: Cranial nerves  grossly intact.  Mentation and speech appropriate for age.  PSYCH: Appropriate affect, tone, and pace of words        Sincerely,    Mary Childers PA-C      10 minutes spent by me on the date of the encounter doing chart review, history and exam, documentation and further activities per the note      Virtual Visit Details    Type of service:  Video Visit     Originating Location (pt. Location): Home    Distant Location (provider location):  Off-site  Platform used for Video Visit: Melissa

## 2024-04-03 NOTE — NURSING NOTE
Is the patient currently in the state of [place]? YES    Visit mode:VIDEO    If the visit is dropped, the patient can be reconnected by: VIDEO VISIT: Send to e-mail at: [email or web address]    Will anyone else be joining the visit? NO  (If patient encounters technical issues they should call [phone]    How would you like to obtain your AVS? MyChart    Are changes needed to the allergy or medication list? No    Reason for visit: OC SOLER

## 2024-04-09 ENCOUNTER — TELEPHONE (OUTPATIENT)
Dept: ENDOCRINOLOGY | Facility: CLINIC | Age: 64
End: 2024-04-09
Payer: COMMERCIAL

## 2024-04-09 NOTE — TELEPHONE ENCOUNTER
Left Voicemail (1st Attempt) and Sent Mychart (1st Attempt) for the patient to call back and schedule the following:    Appointment type: [facility]   Provider: Mary Childers PA-C   Return date: 4-5 mos   Specialty phone number: [phone]  Additional appointment(s) needed: no   Additonal Notes: no

## 2024-05-02 ENCOUNTER — MYC MEDICAL ADVICE (OUTPATIENT)
Dept: ENDOCRINOLOGY | Facility: CLINIC | Age: 64
End: 2024-05-02
Payer: COMMERCIAL

## 2024-05-02 DIAGNOSIS — E66.811 CLASS 1 OBESITY WITH SERIOUS COMORBIDITY AND BODY MASS INDEX (BMI) OF 32.0 TO 32.9 IN ADULT, UNSPECIFIED OBESITY TYPE: Primary | ICD-10-CM

## 2024-05-10 ENCOUNTER — VIRTUAL VISIT (OUTPATIENT)
Dept: UROLOGY | Facility: CLINIC | Age: 64
End: 2024-05-10
Payer: COMMERCIAL

## 2024-05-10 DIAGNOSIS — R97.20 ELEVATED PROSTATE SPECIFIC ANTIGEN (PSA): Primary | ICD-10-CM

## 2024-05-10 PROCEDURE — 99213 OFFICE O/P EST LOW 20 MIN: CPT | Mod: 95 | Performed by: STUDENT IN AN ORGANIZED HEALTH CARE EDUCATION/TRAINING PROGRAM

## 2024-05-10 ASSESSMENT — PAIN SCALES - GENERAL: PAINLEVEL: NO PAIN (0)

## 2024-05-10 NOTE — PROGRESS NOTES
Virtual Visit Details    Type of service:  Video Visit   Video start time: 10:12 AM  Video end time: 10:18 AM  Originating Location (pt. Location): Home    Distant Location (provider location):  On-site  Platform used for Video Visit: [facility]    HPI:  Speedy Carlson is a 63 year old male being seen for elevated PSA follow-up.  Duration of problem: 1 and half years  Previous treatments: Prostate biopsy 1 year ago      Reviewed previous notes  Issa occasionally has worsening flow issues otherwise doing well           Review of Systems:  From intake questionnaire     Skin: negative  Eyes: negative  Ears/Nose/Throat: negative  Respiratory: No shortness of breath, dyspnea on exertion, cough, or hemoptysis  Cardiovascular: No chest pain or palpitations  Gastrointestinal: negative; no nausea/vomiting, constipation or diarrhea  Genitourinary: as per HPI  Musculoskeletal: negative  Neurologic: negative  Psychiatric: negative  Hematologic/Lymphatic/Immunologic: negative  Endocrine: negative         Physical Exam:   This is a virtual visit    Alert, no acute distress, oriented, conversant    Ears/nose/mouth: mouth:normal, good dentition  Respiratory: no respiratory distress, or pursed lip breathing  Cardiovascular:no obvious jugular venous distension present  Skin: no suspicious lesions or rashes on Visible body parts on the Screen  Neuro: Alert, oriented, speech and mentation normal  Psych: affect and mood normal, alert and oriented to person, place and time  Review of Imaging:  The following imaging exams were independently viewed and interpreted by me and discussed with patient:      Review of Labs:  The following labs were reviewed by me and discussed with the patient:  PSA: Abnormal: 6.2 ng/mL    Assessment & Plan     Elevated prostate specific antigen (PSA)  PSA currently at 6.2 ng/mL  Previously was 6.9 prior to biopsy  Denies any significant LUTS  PSA density is well below 0.1 considering the prostate size of about 80  g during last evaluation  Discussed with him that he can now continue yearly PSA measurements with his primary care and see me as needed  Consider reevaluation with me if PSA goes above 10      Antwan Roche MD  [facility]      ==========================    Additional Billing and Coding Information:  Review of external notes as documented above   Review of the result(s) of each unique test - PSA              12 minutes spent by me on the date of the encounter doing chart review, review of test results, interpretation of tests, patient visit, and documentation

## 2024-05-10 NOTE — LETTER
5/10/2024       RE: Speedy Carlson  [address] [facility] [address]     Dear Colleague,    Thank you for referring your patient, Speedy Carlson, to the [facility] at [facility]. Please see a copy of my visit note below.    Virtual Visit Details    Type of service:  Video Visit   Video start time: 10:12 AM  Video end time: 10:18 AM  Originating Location (pt. Location): Home    Distant Location (provider location):  On-site  Platform used for Video Visit: [facility]    HPI:  Speedy Carlson is a 63 year old male being seen for elevated PSA follow-up.  Duration of problem: 1 and half years  Previous treatments: Prostate biopsy 1 year ago      Reviewed previous notes  Issa occasionally has worsening flow issues otherwise doing well           Review of Systems:  From intake questionnaire     Skin: negative  Eyes: negative  Ears/Nose/Throat: negative  Respiratory: No shortness of breath, dyspnea on exertion, cough, or hemoptysis  Cardiovascular: No chest pain or palpitations  Gastrointestinal: negative; no nausea/vomiting, constipation or diarrhea  Genitourinary: as per HPI  Musculoskeletal: negative  Neurologic: negative  Psychiatric: negative  Hematologic/Lymphatic/Immunologic: negative  Endocrine: negative         Physical Exam:   This is a virtual visit    Alert, no acute distress, oriented, conversant    Ears/nose/mouth: mouth:normal, good dentition  Respiratory: no respiratory distress, or pursed lip breathing  Cardiovascular:no obvious jugular venous distension present  Skin: no suspicious lesions or rashes on Visible body parts on the Screen  Neuro: Alert, oriented, speech and mentation normal  Psych: affect and mood normal, alert and oriented to person, place and time  Review of Imaging:  The following imaging exams were independently viewed and interpreted by me and discussed with patient:      Review of Labs:  The  following labs were reviewed by me and discussed with the patient:  PSA: Abnormal: 6.2 ng/mL    Assessment & Plan    Elevated prostate specific antigen (PSA)  PSA currently at 6.2 ng/mL  Previously was 6.9 prior to biopsy  Denies any significant LUTS  PSA density is well below 0.1 considering the prostate size of about 80 g during last evaluation  Discussed with him that he can now continue yearly PSA measurements with his primary care and see me as needed  Consider reevaluation with me if PSA goes above 10      Antwan Roche MD  [facility]      ==========================    Additional Billing and Coding Information:  Review of external notes as documented above   Review of the result(s) of each unique test - PSA              12 minutes spent by me on the date of the encounter doing chart review, review of test results, interpretation of tests, patient visit, and documentation

## 2024-09-05 DIAGNOSIS — E66.811 CLASS 1 OBESITY WITH SERIOUS COMORBIDITY AND BODY MASS INDEX (BMI) OF 32.0 TO 32.9 IN ADULT, UNSPECIFIED OBESITY TYPE: ICD-10-CM

## 2024-09-05 NOTE — TELEPHONE ENCOUNTER
Medication Requested:  tirzepatide-Weight Management (ZEPBOUND) 10 MG/0.5ML prefilled pen 2 mL 3 5/2/2024 -- No   Sig - Route: Inject 0.5 mLs (10 mg) Subcutaneous every 7 days - Subcutaneous     ----------------------  Last Office Visit : 4/3/2024  [facility] [place]    Future Office visit:     10/9/2024 1:00 PM (30 min)  Justin   Arrive by: 12:45 PM   RETURN WEIGHT MANAGEMENT    [facility] ([facility])   Mary Childers PA-C     ----------------------    Refill decision: Refill pended and routed to the provider for review/determination due to the following criteria not met:     Medication not on [facility], [facility], [facility] refill protocol

## 2024-09-25 NOTE — TELEPHONE ENCOUNTER
PRIOR AUTHORIZATION DENIED    Medication: Ozempic-PA denied    Denial Date: 1/20/2021    Denial Rational:         Appeal Information:            spouse

## 2024-10-09 ENCOUNTER — VIRTUAL VISIT (OUTPATIENT)
Dept: ENDOCRINOLOGY | Facility: CLINIC | Age: 64
End: 2024-10-09
Payer: COMMERCIAL

## 2024-10-09 VITALS — BODY MASS INDEX: 28.75 KG/M2 | WEIGHT: 212 LBS

## 2024-10-09 DIAGNOSIS — E66.811 CLASS 1 OBESITY WITH SERIOUS COMORBIDITY AND BODY MASS INDEX (BMI) OF 32.0 TO 32.9 IN ADULT, UNSPECIFIED OBESITY TYPE: Primary | ICD-10-CM

## 2024-10-09 PROCEDURE — 99213 OFFICE O/P EST LOW 20 MIN: CPT | Mod: 95 | Performed by: PHYSICIAN ASSISTANT

## 2024-10-09 PROCEDURE — G2211 COMPLEX E/M VISIT ADD ON: HCPCS | Mod: 95 | Performed by: PHYSICIAN ASSISTANT

## 2024-10-09 ASSESSMENT — PAIN SCALES - GENERAL: PAINLEVEL: NO PAIN (0)

## 2024-10-09 NOTE — NURSING NOTE
Current patient location: [address] [facility] [address]    Is the patient currently in the state of [place]? YES    Visit mode:VIDEO    If the visit is dropped, the patient can be reconnected by: VIDEO VISIT: Text to cell phone:   Telephone Information:   Mobile [phone]       Will anyone else be joining the visit? NO  (If patient encounters technical issues they should call [phone]    Are changes needed to the allergy or medication list? No, Pt stated no changes to allergies, and Pt stated no med changes    Are refills needed on medications prescribed by this physician? NO    Rooming Documentation:  Questionnaire(s) completed    Reason for visit: RECHECK (SANCHEZ)    Rina SOLER

## 2024-10-09 NOTE — PROGRESS NOTES
Return Medical Weight Management Note     Speedy Carlson  MRN:  [id number]  :  1960  CAMILO:  10/9/2024    Dear Steve Mcmahon MD,    I had the pleasure of seeing your patient Speedy Carlson. He is a 63 year old male who I am continuing to see for treatment of obesity related to:        2019    10:06 AM   --   I have the following health issues associated with obesity Heart Disease    High Blood Pressure    High Cholesterol    Sleep Apnea   I have the following symptoms associated with obesity None of the above       Assessment & Plan   Problem List Items Addressed This Visit    None  Visit Diagnoses       Class 1 obesity with serious comorbidity and body mass index (BMI) of 32.0 to 32.9 in adult, unspecified obesity type    -  Primary    Relevant Medications    tirzepatide-Weight Management (ZEPBOUND) 12.5 MG/0.5ML prefilled pen           Weight mgmt follow up  Has lost from 253 to 212 in total (16% TBW)    Last visit 4/3/24 and lost from 228 to 212 since last visit  Doing well on Zepbound 10mg, no longer having s/e  Weight loss has stopped the last month    Increase zepbound to 12.5mg weekly    Follow up plan:    MTM 2 mo phone  Mary 4 mo return MWM video      INTERVAL HISTORY:      Recent diet changes: no longer eating pasta/pizza. Does still have cravings for some sweets.    CURRENT WEIGHT:   212 lbs 0 oz    Initial Weight (lbs): 253 lbs  Last Visits Weight: 103.4 kg (228 lb)  Cumulative weight loss (lbs): 41  Weight Loss Percentage: 16.21%        10/7/2024    12:02 PM   Changes and Difficulties   I have made the following changes to my diet since my last visit: Reduced calorie intake. Reduced, red meat and pasta intake.   With regards to my diet, I am still struggling with: Sugar   I have made the following changes to my activity/exercise since my last visit: Not much   With regards to my activity/exercise, I am still struggling with: I don't have as much energy as I though after weight  loss.         MEDICATIONS:   Current Outpatient Medications   Medication Sig Dispense Refill    albuterol (PROAIR HFA/PROVENTIL HFA/VENTOLIN HFA) 108 (90 Base) MCG/ACT inhaler Inhale 1-2 puffs into the lungs every 4 hours as needed for shortness of breath, wheezing or cough 18 g 11    fluticasone-salmeterol (ADVAIR HFA) 115-21 MCG/ACT inhaler Inhale 2 puffs into the lungs 2 times daily 12 g 5    montelukast (SINGULAIR) 10 MG tablet Take 1 tablet (10 mg) by mouth at bedtime 90 tablet 3    ondansetron (ZOFRAN ODT) 4 MG ODT tab Take 1 tablet (4 mg) by mouth every 8 hours as needed for nausea 15 tablet 0    pravastatin (PRAVACHOL) 40 MG tablet Take 40 mg by mouth every morning       tirzepatide-Weight Management (ZEPBOUND) 12.5 MG/0.5ML prefilled pen Inject 0.5 mLs (12.5 mg) subcutaneously every 7 days. 2 mL 3    traZODone (DESYREL) 50 MG tablet Take 150 mg by mouth At Bedtime       zolpidem (AMBIEN) 5 MG tablet Take 5 mg by mouth nightly as needed for sleep       amLODIPine (NORVASC) 5 MG tablet Take 5 mg by mouth every morning              10/7/2024    12:02 PM   Weight Loss Medication History Reviewed With Patient   Are you having any side effects from the weight loss medication that we have prescribed you? No       Allied Health/Nurse Visit on 09/14/2022   Component Date Value Ref Range Status    FVC-Pred 09/14/2022 4.97  L Final    FVC-Pre 09/14/2022 5.14  L Final    FVC-%Pred-Pre 09/14/2022 103  % Final    FEV1-Pre 09/14/2022 4.07  L Final    FEV1-%Pred-Pre 09/14/2022 106  % Final    FEV1FVC-Pred 09/14/2022 77  % Final    FEV1FVC-Pre 09/14/2022 79  % Final    FEFMax-Pred 09/14/2022 9.68  L/sec Final    FEFMax-Pre 09/14/2022 9.96  L/sec Final    FEFMax-%Pred-Pre 09/14/2022 102  % Final    FEF2575-Pred 09/14/2022 3.14  L/sec Final    FEF2575-Pre 09/14/2022 3.75  L/sec Final    OOM0023-%Pred-Pre 09/14/2022 119  % Final    ExpTime-Pre 09/14/2022 3.42  sec Final    FIFMax-Pre 09/14/2022 5.39  L/sec Final    VC-Pred  09/14/2022 5.34  L Final    VC-Pre 09/14/2022 5.51  L Final    VC-%Pred-Pre 09/14/2022 103  % Final    IC-Pred 09/14/2022 4.32  L Final    IC-Pre 09/14/2022 4.72  L Final    IC-%Pred-Pre 09/14/2022 109  % Final    ERV-Pred 09/14/2022 1.02  L Final    ERV-Pre 09/14/2022 0.78  L Final    ERV-%Pred-Pre 09/14/2022 76  % Final    FEV1FEV6-Pred 09/14/2022 79  % Final    FEV1FEV6-Pre 09/14/2022 80  % Final    FRCPleth-Pred 09/14/2022 3.73  L Final    FRCPleth-Pre 09/14/2022 3.56  L Final    FRCPleth-%Pred-Pre 09/14/2022 95  % Final    RVPleth-Pred 09/14/2022 2.49  L Final    RVPleth-Pre 09/14/2022 2.77  L Final    RVPleth-%Pred-Pre 09/14/2022 111  % Final    TLCPleth-Pred 09/14/2022 7.53  L Final    TLCPleth-Pre 09/14/2022 8.28  L Final    TLCPleth-%Pred-Pre 09/14/2022 109  % Final    DLCOunc-Pred 09/14/2022 29.36  ml/min/mmHg Final    DLCOunc-Pre 09/14/2022 30.73  ml/min/mmHg Final    DLCOunc-%Pred-Pre 09/14/2022 104  % Final    VA-Pre 09/14/2022 7.48  L Final    VA-%Pred-Pre 09/14/2022 106  % Final    FEV1SVC-Pred 09/14/2022 72  % Final    FEV1SVC-Pre 09/14/2022 74  % Final       PHYSICAL EXAM:  Objective    Wt 96.2 kg (212 lb)   BMI 28.75 kg/m      Vitals - Patient Reported  Pain Score: No Pain (0)        GENERAL: alert and no distress  EYES: Eyes grossly normal to inspection.  No discharge or erythema, or obvious scleral/conjunctival abnormalities.  RESP: No audible wheeze, cough, or visible cyanosis.    SKIN: Visible skin clear. No significant rash, abnormal pigmentation or lesions.  NEURO: Cranial nerves grossly intact.  Mentation and speech appropriate for age.  PSYCH: Appropriate affect, tone, and pace of words        Sincerely,    Mary Childers PA-C      20 minutes spent by me on the date of the encounter doing chart review, history and exam, documentation and further activities per the note    Virtual Visit Details    Type of service:  Video Visit     Originating Location (pt. Location):  Home    Distant Location (provider location):  Off-site  Platform used for Video Visit: [facility]    The longitudinal plan of care for the diagnosis(es)/condition(s) as documented were addressed during this visit. Due to the added complexity in care, I will continue to support Issa in the subsequent management and with ongoing continuity of care.

## 2024-10-09 NOTE — LETTER
10/9/2024       RE: Speedy Carlson  [address] [facility] [address]     Dear Colleague,    Thank you for referring your patient, Speedy Carlson, to the [facility] [place] at [facility]. Please see a copy of my visit note below.      Return Medical Weight Management Note     Speedy Carlson  MRN:  [id number]  :  1960  CAMILO:  10/9/2024    Dear Steve Mcmahon MD,    I had the pleasure of seeing your patient Speedy Carlson. He is a 63 year old male who I am continuing to see for treatment of obesity related to:        2019    10:06 AM   --   I have the following health issues associated with obesity Heart Disease    High Blood Pressure    High Cholesterol    Sleep Apnea   I have the following symptoms associated with obesity None of the above       Assessment & Plan  Problem List Items Addressed This Visit    None  Visit Diagnoses       Class 1 obesity with serious comorbidity and body mass index (BMI) of 32.0 to 32.9 in adult, unspecified obesity type    -  Primary    Relevant Medications    tirzepatide-Weight Management (ZEPBOUND) 12.5 MG/0.5ML prefilled pen           Weight mgmt follow up  Has lost from 253 to 212 in total (16% TBW)    Last visit 4/3/24 and lost from 228 to 212 since last visit  Doing well on Zepbound 10mg, no longer having s/e  Weight loss has stopped the last month    Increase zepbound to 12.5mg weekly    Follow up plan:    MTM 2 mo phone  Mary 4 mo return MWM video      INTERVAL HISTORY:      Recent diet changes: no longer eating pasta/pizza. Does still have cravings for some sweets.    CURRENT WEIGHT:   212 lbs 0 oz    Initial Weight (lbs): 253 lbs  Last Visits Weight: 103.4 kg (228 lb)  Cumulative weight loss (lbs): 41  Weight Loss Percentage: 16.21%        10/7/2024    12:02 PM   Changes and Difficulties   I have made the following changes to my diet since my last visit: Reduced  calorie intake. Reduced, red meat and pasta intake.   With regards to my diet, I am still struggling with: Sugar   I have made the following changes to my activity/exercise since my last visit: Not much   With regards to my activity/exercise, I am still struggling with: I don't have as much energy as I though after weight loss.         MEDICATIONS:   Current Outpatient Medications   Medication Sig Dispense Refill     albuterol (PROAIR HFA/PROVENTIL HFA/VENTOLIN HFA) 108 (90 Base) MCG/ACT inhaler Inhale 1-2 puffs into the lungs every 4 hours as needed for shortness of breath, wheezing or cough 18 g 11     fluticasone-salmeterol (ADVAIR HFA) 115-21 MCG/ACT inhaler Inhale 2 puffs into the lungs 2 times daily 12 g 5     montelukast (SINGULAIR) 10 MG tablet Take 1 tablet (10 mg) by mouth at bedtime 90 tablet 3     ondansetron (ZOFRAN ODT) 4 MG ODT tab Take 1 tablet (4 mg) by mouth every 8 hours as needed for nausea 15 tablet 0     pravastatin (PRAVACHOL) 40 MG tablet Take 40 mg by mouth every morning        tirzepatide-Weight Management (ZEPBOUND) 12.5 MG/0.5ML prefilled pen Inject 0.5 mLs (12.5 mg) subcutaneously every 7 days. 2 mL 3     traZODone (DESYREL) 50 MG tablet Take 150 mg by mouth At Bedtime        zolpidem (AMBIEN) 5 MG tablet Take 5 mg by mouth nightly as needed for sleep        amLODIPine (NORVASC) 5 MG tablet Take 5 mg by mouth every morning              10/7/2024    12:02 PM   Weight Loss Medication History Reviewed With Patient   Are you having any side effects from the weight loss medication that we have prescribed you? No       Allied Health/Nurse Visit on 09/14/2022   Component Date Value Ref Range Status     FVC-Pred 09/14/2022 4.97  L Final     FVC-Pre 09/14/2022 5.14  L Final     FVC-%Pred-Pre 09/14/2022 103  % Final     FEV1-Pre 09/14/2022 4.07  L Final     FEV1-%Pred-Pre 09/14/2022 106  % Final     FEV1FVC-Pred 09/14/2022 77  % Final     FEV1FVC-Pre 09/14/2022 79  % Final     FEFMax-Pred  09/14/2022 9.68  L/sec Final     FEFMax-Pre 09/14/2022 9.96  L/sec Final     FEFMax-%Pred-Pre 09/14/2022 102  % Final     FEF2575-Pred 09/14/2022 3.14  L/sec Final     FEF2575-Pre 09/14/2022 3.75  L/sec Final     SRE3891-%Pred-Pre 09/14/2022 119  % Final     ExpTime-Pre 09/14/2022 3.42  sec Final     FIFMax-Pre 09/14/2022 5.39  L/sec Final     VC-Pred 09/14/2022 5.34  L Final     VC-Pre 09/14/2022 5.51  L Final     VC-%Pred-Pre 09/14/2022 103  % Final     IC-Pred 09/14/2022 4.32  L Final     IC-Pre 09/14/2022 4.72  L Final     IC-%Pred-Pre 09/14/2022 109  % Final     ERV-Pred 09/14/2022 1.02  L Final     ERV-Pre 09/14/2022 0.78  L Final     ERV-%Pred-Pre 09/14/2022 76  % Final     FEV1FEV6-Pred 09/14/2022 79  % Final     FEV1FEV6-Pre 09/14/2022 80  % Final     FRCPleth-Pred 09/14/2022 3.73  L Final     FRCPleth-Pre 09/14/2022 3.56  L Final     FRCPleth-%Pred-Pre 09/14/2022 95  % Final     RVPleth-Pred 09/14/2022 2.49  L Final     RVPleth-Pre 09/14/2022 2.77  L Final     RVPleth-%Pred-Pre 09/14/2022 111  % Final     TLCPleth-Pred 09/14/2022 7.53  L Final     TLCPleth-Pre 09/14/2022 8.28  L Final     TLCPleth-%Pred-Pre 09/14/2022 109  % Final     DLCOunc-Pred 09/14/2022 29.36  ml/min/mmHg Final     DLCOunc-Pre 09/14/2022 30.73  ml/min/mmHg Final     DLCOunc-%Pred-Pre 09/14/2022 104  % Final     VA-Pre 09/14/2022 7.48  L Final     VA-%Pred-Pre 09/14/2022 106  % Final     FEV1SVC-Pred 09/14/2022 72  % Final     FEV1SVC-Pre 09/14/2022 74  % Final       PHYSICAL EXAM:  Objective   Wt 96.2 kg (212 lb)   BMI 28.75 kg/m      Vitals - Patient Reported  Pain Score: No Pain (0)        GENERAL: alert and no distress  EYES: Eyes grossly normal to inspection.  No discharge or erythema, or obvious scleral/conjunctival abnormalities.  RESP: No audible wheeze, cough, or visible cyanosis.    SKIN: Visible skin clear. No significant rash, abnormal pigmentation or lesions.  NEURO: Cranial nerves grossly intact.  Mentation and speech  appropriate for age.  PSYCH: Appropriate affect, tone, and pace of words        Sincerely,    Mary Childers PA-C      20 minutes spent by me on the date of the encounter doing chart review, history and exam, documentation and further activities per the note    Virtual Visit Details    Type of service:  Video Visit     Originating Location (pt. Location): Home    Distant Location (provider location):  Off-site  Platform used for Video Visit: [facility]    The longitudinal plan of care for the diagnosis(es)/condition(s) as documented were addressed during this visit. Due to the added complexity in care, I will continue to support Issa in the subsequent management and with ongoing continuity of care.      Again, thank you for allowing me to participate in the care of your patient.      Sincerely,    Mary Childers PA-C

## 2024-10-10 NOTE — PROGRESS NOTES
Pulmonary Clinic Note    Date of Service: 10/11/2024     Chief Complaint   Patient presents with    RECHECK     Mild persistent asthma without complication +1 more       A/P:  63M HTN, HLD, CAD, HANNAH being seen for mild-persistent asthma f/u. Symptoms have been worsening over the last year. Of note, he is off maintenance therapy. ACT 12 today.     - start [id number] (Breztri) twice daily, rinse mouth out after use  - continue montelukast  - continue prn albuterol   - OK to substitute medications based on formulary     History:  63M HTN, HLD, CAD, HANNAH being seen for mild-persistent asthma f/u. Last seen by me 2/2023. He saw my colleague Cecilia Grigsby PA-C 1/2024. He is prescribed ICS-LABA (Advair), montelukast, and prn albuterol. He does not feel Advair is helpful and is not using it. Did not notice a difference when stopping it. Feels like asthma is worse after eating, no specific food he can relate this to. Uses albuterol, which is very helpful. Using albuterol a few times per day. TEIXEIRA w/ moderate activity. At times SOB at rest. No chest pain or tightness. Occasional wheezing. Frequent cough, dry. No nocturnal cough. Using montelukast.     Smoking: never                            Bird exposure: no             Animal exposure: 2 dogs        Inhalation exposure: no    Occupation: works for an [employer] firm                         10 point review of systems negative, aside from that mentioned in HPI.    /81 (BP Location: Left arm, Patient Position: Sitting, Cuff Size: Adult Large)   Pulse 92   Wt 97.5 kg (214 lb 14.4 oz)   SpO2 97%   BMI 29.15 kg/m    Gen: well-appearing  HEENT: Mallampati IV  Card: RRR  Pulm: clear bilaterally   Abd: soft  MSK: no edema, no acute joint abnormality   Skin: no obvious rash  Psych: normal affect  Neuro: alert and oriented     Labs:  Personally reviewed  Abs eos (12/2022) - 300     Imaging/Studies: Personally reviewed  PFTs (9/2022) - normal pulmonary function  CXR  (5/2022) - no acute infiltrates    Past Medical History:   Diagnosis Date    Acute prostatitis 12/2004    Calculus of kidney 1994    no recurrence    Cervical radiculopathy 10/04/2016    R side    Class 1 obesity due to excess calories without serious comorbidity with body mass index (BMI) of 31.0 to 31.9 in adult 07/06/2020    Coronary artery disease due to lipid rich plaque     coronary arteryt calcium score of 22, 63rd percentile in 2016    Dry eye syndrome     Hypertension     Idiopathic urticaria     Impotence of organic origin     Insomnia     Mild intermittent asthma 1975    hx in childhood    Mixed hyperlipidemia     Obesity     HANNAH (obstructive sleep apnea)     Other acne     Prostatitis     Seborrhea capitis     Tinnitus     Varicella without mention of complication      Past Surgical History:   Procedure Laterality Date    COLONOSCOPY  2012    COLONOSCOPY N/A 4/28/2022    Procedure: COLONOSCOPY;  Surgeon: Echo Caldera MD;  Location:  GI    DENTAL SURGERY  1980    wisdom teeth    ENDOSCOPY DRUG INDUCED SLEEP N/A 6/3/2020    Procedure: DRUG-INDUCED SLEEP ENDOSCOPY;  Surgeon: Gladys Marroquin MD;  Location:  OR    IMPLANT GENERATOR STIMULATOR (LOCATION) Right 10/28/2020    Procedure: INSERTION, PULSE GENERATOR, NEUROSTIMULATOR;  Surgeon: Gladys Marroquin MD;  [place] [facility] [place]    PHOTOREFRACTIVE KERATECTOMY  2000    s/p Lasik surgery    TONSILLECTOMY  1971    s/p Tonsillectomy     Family History   Problem Relation Age of Onset    Cancer Father         lung, smoker    Breast Cancer Mother         at age  45    Depression Mother     Obesity Mother     Eye Disorder Mother         cataracts    Hyperlipidemia Mother     Hypertension Mother     Coronary Artery Disease Mother         First MI betweeen 60 and 66 year of age, 3 MIs in past 15 years    Pulmonary Embolism Mother         on Eliquis    Coronary Artery Disease Brother         MI at age 30, pericarditis then MI     Coronary Artery Disease Brother         CABG x 4 at age 48, smoker    Hyperlipidemia Brother     Obesity Brother         Has lost over 100 pounds     Social History     Socioeconomic History    Marital status:      Spouse name: Acosta Zuleta    Number of children: 0    Years of education: 18    Highest education level: Not on file   Occupational History    Occupation: Research Analyst     Employer: [employer]    Occupation: Consultant     Comment: self-employed   Tobacco Use    Smoking status: Never    Smokeless tobacco: Never    Tobacco comments:     cigar occ   Substance and Sexual Activity    Alcohol use: Yes     Comment: 2-4 beers per week    Drug use: No    Sexual activity: Yes     Partners: Female     Birth control/protection: Pill   Other Topics Concern    Not on file   Social History Narrative    Social Documentation:        Balanced Diet: YES    Calcium intake: 1-2 per day    Caffeine: 1 cups coffee per day-during week    Exercise:  type of activity walk;  3 days / week  exercycle, treadmill    Sunscreen: Yes    Seatbelts:  Yes    Self Testicular Exam: Yes    Physical/Emotional/Sexual Abuse: No     Do you feel safe in your environment? Yes        Cholesterol screen up to date: No-Discussed    Eye Exam up to date: Yes    Dental Exam up to date: Yes    Dexa Scan up to date: Does Not Apply    Colonoscopy up to date: Does Not Apply    Immunizations up to date: Yes     Glucose screen if over 40:  No- Discussed        Brittany Canada MA     Social Determinants of Health     Financial Resource Strain: Not At Risk (4/25/2024)    Received from [employer]    Financial Resource Strain     Is it hard for you to pay for the very basics like food, housing, medical care or heating?: No   Food Insecurity: Not At Risk (4/25/2024)    Received from [facility]    Food Insecurity     Does your food run out before you have the money to buy more?: No   Transportation Needs: Not At Risk (4/25/2024)     Received from [employer]    Transportation Needs     Does a lack of transportation keep you from your medical appointments or from getting your medications?: No   Physical Activity: Not on file   Stress: Not on file   Social Connections: Unknown (12/29/2021)    [facility]    Social Connections     Frequency of Communication with Friends and Family: Not on file   Interpersonal Safety: Not At Risk (4/25/2024)    [facility]    Humiliation, Afraid, Rape, and Kick questionnaire     Fear of Current or Ex-Partner: No     Emotionally Abused: No     Physically Abused: No     Sexually Abused: No   Housing Stability: Unknown (4/25/2024)    Received from [employer]    Housing Stability Vital Sign     Unable to Pay for Housing in the Last Year: No     Number of Places Lived in the Last Year: Not on file     Unstable Housing in the Last Year: No       35 minutes spent reviewing chart, reviewing test results, talking with and examining patient, formulating plan, and documentation on the day of the encounter.    Xavier Umanzor MD  Pulmonary and Critical Care Medicine  [facility]

## 2024-10-11 ENCOUNTER — OFFICE VISIT (OUTPATIENT)
Dept: PULMONOLOGY | Facility: CLINIC | Age: 64
End: 2024-10-11
Attending: PHYSICIAN ASSISTANT
Payer: COMMERCIAL

## 2024-10-11 VITALS
HEART RATE: 92 BPM | OXYGEN SATURATION: 97 % | DIASTOLIC BLOOD PRESSURE: 81 MMHG | WEIGHT: 214.9 LBS | SYSTOLIC BLOOD PRESSURE: 128 MMHG | BODY MASS INDEX: 29.15 KG/M2

## 2024-10-11 DIAGNOSIS — J45.40 MODERATE PERSISTENT ASTHMA WITHOUT COMPLICATION: Primary | ICD-10-CM

## 2024-10-11 PROCEDURE — 99214 OFFICE O/P EST MOD 30 MIN: CPT | Performed by: STUDENT IN AN ORGANIZED HEALTH CARE EDUCATION/TRAINING PROGRAM

## 2024-10-11 RX ORDER — BUDESONIDE, GLYCOPYRROLATE, AND FORMOTEROL FUMARATE 160; 9; 4.8 UG/1; UG/1; UG/1
2 AEROSOL, METERED RESPIRATORY (INHALATION) 2 TIMES DAILY
Qty: 10.7 G | Refills: 5 | Status: SHIPPED | OUTPATIENT
Start: 2024-10-11

## 2024-10-11 ASSESSMENT — ASTHMA QUESTIONNAIRES
ACT_TOTALSCORE: 12
QUESTION_1 LAST FOUR WEEKS HOW MUCH OF THE TIME DID YOUR ASTHMA KEEP YOU FROM GETTING AS MUCH DONE AT WORK, SCHOOL OR AT HOME: A LITTLE OF THE TIME
QUESTION_2 LAST FOUR WEEKS HOW OFTEN HAVE YOU HAD SHORTNESS OF BREATH: MORE THAN ONCE A DAY
QUESTION_4 LAST FOUR WEEKS HOW OFTEN HAVE YOU USED YOUR RESCUE INHALER OR NEBULIZER MEDICATION (SUCH AS ALBUTEROL): ONE OR TWO TIMES PER DAY
ACT_TOTALSCORE: 12
QUESTION_5 LAST FOUR WEEKS HOW WOULD YOU RATE YOUR ASTHMA CONTROL: SOMEWHAT CONTROLLED
QUESTION_3 LAST FOUR WEEKS HOW OFTEN DID YOUR ASTHMA SYMPTOMS (WHEEZING, COUGHING, SHORTNESS OF BREATH, CHEST TIGHTNESS OR PAIN) WAKE YOU UP AT NIGHT OR EARLIER THAN USUAL IN THE MORNING: TWO OR THREE NIGHTS A WEEK

## 2024-10-11 ASSESSMENT — PAIN SCALES - GENERAL: PAINLEVEL: NO PAIN (0)

## 2024-10-11 NOTE — LETTER
10/11/2024      Speedy Carlson  [address] [facility] [address]      Dear Colleague,    Thank you for referring your patient, Speedy Carlson, to the [facility] [place]. Please see a copy of my visit note below.    Pulmonary Clinic Note    Date of Service: 10/11/2024     Chief Complaint   Patient presents with     RECHECK     Mild persistent asthma without complication +1 more       A/P:  63M HTN, HLD, CAD, HANNAH being seen for mild-persistent asthma f/u. Symptoms have been worsening over the last year. Of note, he is off maintenance therapy. ACT 12 today.     - start [id number] (Breztri) twice daily, rinse mouth out after use  - continue montelukast  - continue prn albuterol   - OK to substitute medications based on formulary     History:  63M HTN, HLD, CAD, HANNAH being seen for mild-persistent asthma f/u. Last seen by me 2/2023. He saw my colleague Cecilia Grigsby PA-C 1/2024. He is prescribed ICS-LABA (Advair), montelukast, and prn albuterol. He does not feel Advair is helpful and is not using it. Did not notice a difference when stopping it. Feels like asthma is worse after eating, no specific food he can relate this to. Uses albuterol, which is very helpful. Using albuterol a few times per day. TEIXEIRA w/ moderate activity. At times SOB at rest. No chest pain or tightness. Occasional wheezing. Frequent cough, dry. No nocturnal cough. Using montelukast.     Smoking: never                            Bird exposure: no             Animal exposure: 2 dogs        Inhalation exposure: no    Occupation: works for an [employer] firm                         10 point review of systems negative, aside from that mentioned in HPI.    /81 (BP Location: Left arm, Patient Position: Sitting, Cuff Size: Adult Large)   Pulse 92   Wt 97.5 kg (214 lb 14.4 oz)   SpO2 97%   BMI 29.15 kg/m    Gen: well-appearing  HEENT: Mallampati IV  Card: RRR  Pulm: clear bilaterally   Abd: soft  MSK: no edema,  no acute joint abnormality   Skin: no obvious rash  Psych: normal affect  Neuro: alert and oriented     Labs:  Personally reviewed  Abs eos (12/2022) - 300     Imaging/Studies: Personally reviewed  PFTs (9/2022) - normal pulmonary function  CXR (5/2022) - no acute infiltrates    Past Medical History:   Diagnosis Date     Acute prostatitis 12/2004     Calculus of kidney 1994    no recurrence     Cervical radiculopathy 10/04/2016    R side     Class 1 obesity due to excess calories without serious comorbidity with body mass index (BMI) of 31.0 to 31.9 in adult 07/06/2020     Coronary artery disease due to lipid rich plaque     coronary arteryt calcium score of 22, 63rd percentile in 2016     Dry eye syndrome      Hypertension      Idiopathic urticaria      Impotence of organic origin      Insomnia      Mild intermittent asthma 1975    hx in childhood     Mixed hyperlipidemia      Obesity      HANNAH (obstructive sleep apnea)      Other acne      Prostatitis      Seborrhea capitis      Tinnitus      Varicella without mention of complication      Past Surgical History:   Procedure Laterality Date     COLONOSCOPY  2012     COLONOSCOPY N/A 4/28/2022    Procedure: COLONOSCOPY;  Surgeon: Echo Caldera MD;  Location:  GI     DENTAL SURGERY  1980    wisdom teeth     ENDOSCOPY DRUG INDUCED SLEEP N/A 6/3/2020    Procedure: DRUG-INDUCED SLEEP ENDOSCOPY;  Surgeon: Gladys Marroquin MD;  Location:  OR     IMPLANT GENERATOR STIMULATOR (LOCATION) Right 10/28/2020    Procedure: INSERTION, PULSE GENERATOR, NEUROSTIMULATOR;  Surgeon: Gladys Marroquin MD;  [place] [facility] [place]     PHOTOREFRACTIVE KERATECTOMY  2000    s/p Lasik surgery     TONSILLECTOMY  1971    s/p Tonsillectomy     Family History   Problem Relation Age of Onset     Cancer Father         lung, smoker     Breast Cancer Mother         at age  45     Depression Mother      Obesity Mother      Eye Disorder Mother         cataracts      Hyperlipidemia Mother      Hypertension Mother      Coronary Artery Disease Mother         First MI betweeen 60 and 65 year of age, 3 MIs in past 15 years     Pulmonary Embolism Mother         on Eliquis     Coronary Artery Disease Brother         MI at age 30, pericarditis then MI     Coronary Artery Disease Brother         CABG x 4 at age 48, smoker     Hyperlipidemia Brother      Obesity Brother         Has lost over 100 pounds     Social History     Socioeconomic History     Marital status:      Spouse name: Acosta Zuleta     Number of children: 0     Years of education: 18     Highest education level: Not on file   Occupational History     Occupation: Research Analyst     Employer: [employer]     Occupation: Consultant     Comment: self-employed   Tobacco Use     Smoking status: Never     Smokeless tobacco: Never     Tobacco comments:     cigar occ   Substance and Sexual Activity     Alcohol use: Yes     Comment: 2-4 beers per week     Drug use: No     Sexual activity: Yes     Partners: Female     Birth control/protection: Pill   Other Topics Concern     Not on file   Social History Narrative    Social Documentation:        Balanced Diet: YES    Calcium intake: 1-2 per day    Caffeine: 1 cups coffee per day-during week    Exercise:  type of activity walk;  3 days / week  exercycle, treadmill    Sunscreen: Yes    Seatbelts:  Yes    Self Testicular Exam: Yes    Physical/Emotional/Sexual Abuse: No     Do you feel safe in your environment? Yes        Cholesterol screen up to date: No-Discussed    Eye Exam up to date: Yes    Dental Exam up to date: Yes    Dexa Scan up to date: Does Not Apply    Colonoscopy up to date: Does Not Apply    Immunizations up to date: Yes     Glucose screen if over 40:  No- Discussed        Brittany Canada MA     Social Determinants of Health     Financial Resource Strain: Not At Risk (4/25/2024)    Received from [employer]    Financial Resource Strain      Is it  hard for you to pay for the very basics like food, housing, medical care or heating?: No   Food Insecurity: Not At Risk (4/25/2024)    Received from [employer]    Food Insecurity      Does your food run out before you have the money to buy more?: No   Transportation Needs: Not At Risk (4/25/2024)    [facility]    Transportation Needs      Does a lack of transportation keep you from your medical appointments or from getting your medications?: No   Physical Activity: Not on file   Stress: Not on file   Social Connections: Unknown (12/29/2021)    Received from [facility]    Social Connections      Frequency of Communication with Friends and Family: Not on file   Interpersonal Safety: Not At Risk (4/25/2024)    [facility]    Humiliation, Afraid, Rape, and Kick questionnaire      Fear of Current or Ex-Partner: No      Emotionally Abused: No      Physically Abused: No      Sexually Abused: No   Housing Stability: Unknown (4/25/2024)    [facility]    Housing Stability Vital Sign      Unable to Pay for Housing in the Last Year: No      Number of Places Lived in the Last Year: Not on file      Unstable Housing in the Last Year: No       35 minutes spent reviewing chart, reviewing test results, talking with and examining patient, formulating plan, and documentation on the day of the encounter.    Xavier Umanzor MD  Pulmonary and Critical Care Medicine  [facility]       Again, thank you for allowing me to participate in the care of your patient.        Sincerely,        Xavier Umanzor MD

## 2024-10-11 NOTE — PATIENT INSTRUCTIONS
Start Breztri twice daily, rinse your mouth out after use. Continue Singulair. Continue as needed albuterol for worsening shortness of breath or 5-10 minutes prior to activity.

## 2024-10-11 NOTE — NURSING NOTE
Chief Complaint   Patient presents with    RECHECK     Mild persistent asthma without complication +1 more       Vitals:    10/11/24 1024   BP: 128/81   BP Location: Left arm   Patient Position: Sitting   Cuff Size: Adult Large   Pulse: 92   SpO2: 97%   Weight: 97.5 kg (214 lb 14.4 oz)       Body mass index is 29.15 kg/m .    Indu Chua, [facility]

## 2024-11-14 DIAGNOSIS — I25.10 CORONARY ARTERY DISEASE DUE TO LIPID RICH PLAQUE: ICD-10-CM

## 2024-11-14 DIAGNOSIS — E78.5 HYPERLIPIDEMIA LDL GOAL <130: Primary | Chronic | ICD-10-CM

## 2024-11-14 DIAGNOSIS — I10 PRIMARY HYPERTENSION: Chronic | ICD-10-CM

## 2024-11-14 DIAGNOSIS — I25.83 CORONARY ARTERY DISEASE DUE TO LIPID RICH PLAQUE: ICD-10-CM

## 2024-11-15 ENCOUNTER — TELEPHONE (OUTPATIENT)
Dept: ENDOCRINOLOGY | Facility: CLINIC | Age: 64
End: 2024-11-15
Payer: COMMERCIAL

## 2024-11-15 NOTE — TELEPHONE ENCOUNTER
Patient confirmed scheduled appointment:  Date: 5/7/25  Time: 1 pm  Visit type: return mwm  Provider: Mary Childers  Location: virtual  Testing/imaging: NA  Additional notes: Appointment added to waitlist.  2 mo with MTM per 10/9/24 checkout orders - scheduled for 12/9/24.

## 2024-11-21 ENCOUNTER — OFFICE VISIT (OUTPATIENT)
Dept: CARDIOLOGY | Facility: CLINIC | Age: 64
End: 2024-11-21
Payer: COMMERCIAL

## 2024-11-21 ENCOUNTER — LAB (OUTPATIENT)
Dept: LAB | Facility: CLINIC | Age: 64
End: 2024-11-21
Payer: COMMERCIAL

## 2024-11-21 VITALS
BODY MASS INDEX: 28.68 KG/M2 | SYSTOLIC BLOOD PRESSURE: 129 MMHG | WEIGHT: 211.7 LBS | DIASTOLIC BLOOD PRESSURE: 87 MMHG | HEIGHT: 72 IN

## 2024-11-21 DIAGNOSIS — I25.10 CORONARY ARTERY DISEASE DUE TO LIPID RICH PLAQUE: ICD-10-CM

## 2024-11-21 DIAGNOSIS — I10 PRIMARY HYPERTENSION: Chronic | ICD-10-CM

## 2024-11-21 DIAGNOSIS — E78.5 HYPERLIPIDEMIA LDL GOAL <130: Primary | Chronic | ICD-10-CM

## 2024-11-21 DIAGNOSIS — E78.5 HYPERLIPIDEMIA LDL GOAL <130: Chronic | ICD-10-CM

## 2024-11-21 DIAGNOSIS — I25.83 CORONARY ARTERY DISEASE DUE TO LIPID RICH PLAQUE: ICD-10-CM

## 2024-11-21 LAB
CHOLEST SERPL-MCNC: 135 MG/DL
CRP SERPL HS-MCNC: 1.08 MG/L
FASTING STATUS PATIENT QL REPORTED: YES
FASTING STATUS PATIENT QL REPORTED: YES
GLUCOSE SERPL-MCNC: 85 MG/DL (ref 70–99)
HDLC SERPL-MCNC: 46 MG/DL
LDLC SERPL CALC-MCNC: 79 MG/DL
NONHDLC SERPL-MCNC: 89 MG/DL
TRIGL SERPL-MCNC: 50 MG/DL

## 2024-11-21 PROCEDURE — 36415 COLL VENOUS BLD VENIPUNCTURE: CPT | Performed by: PATHOLOGY

## 2024-11-21 PROCEDURE — 99000 SPECIMEN HANDLING OFFICE-LAB: CPT | Performed by: PATHOLOGY

## 2024-11-21 PROCEDURE — 82043 UR ALBUMIN QUANTITATIVE: CPT | Performed by: NURSE PRACTITIONER

## 2024-11-21 PROCEDURE — 93922 UPR/L XTREMITY ART 2 LEVELS: CPT | Performed by: NURSE PRACTITIONER

## 2024-11-21 PROCEDURE — 80061 LIPID PANEL: CPT | Performed by: PATHOLOGY

## 2024-11-21 PROCEDURE — 99215 OFFICE O/P EST HI 40 MIN: CPT | Mod: 25 | Performed by: NURSE PRACTITIONER

## 2024-11-21 PROCEDURE — 86141 C-REACTIVE PROTEIN HS: CPT | Performed by: NURSE PRACTITIONER

## 2024-11-21 PROCEDURE — 83695 ASSAY OF LIPOPROTEIN(A): CPT | Performed by: NURSE PRACTITIONER

## 2024-11-21 PROCEDURE — 82947 ASSAY GLUCOSE BLOOD QUANT: CPT | Performed by: PATHOLOGY

## 2024-11-21 PROCEDURE — 82570 ASSAY OF URINE CREATININE: CPT | Performed by: NURSE PRACTITIONER

## 2024-11-21 NOTE — PROGRESS NOTES
Simmons Test Results    WALKING BLOOD PRESSURE RESPONSE (3 minute, 5 MET level walk)   Pre BP: 120/72 mmHg  3 min BP: 160/66 mmHg  1 min post BP: 140/72 mmHg    Pre HR: 70 bpm  3 min HR: 105 bpm  1 min post HR: 76 bpm     Test results: Walking blood pressure response to 3 minutes activity is in abnormal range due to increase in blood pressure from the baseline more then 30 points.     RETINAL VASCULAR ASSESSMENT   Left Eye Abnormality:  none  AV Ratio: 0.8    Right Eye Abnormality:  none  AV Ratio: 0.8     Retinal Assessment:  normal    ABDOMINAL AORTA ULTRASOUND (< 2.5 normal, borderline 2.5-2.9, abnormal > 3)   SupraIliac 1.75 cm    SupraRenal 1.91 cm    InfraRenal Proximal 2.12 cm    InfraRenal Distal 1.87 cm      Abdominal Aorta Assessment:  normal    LEFT VENTRICULAR ULTRASOUND MEASUREMENTS (adjusted for BSA)  LVIDD 49.8 mm   Septa 8.7 mm   Posterior 8.9 mm     Left Ventricular US Assessment:  normal    Carotid Artery IMT measurements report and plaques in the small area examined:   Left IMT 1.03 mm  Plaques none    Right IMT 0.861 mm  Plaques none     Test results: Carotid arteries wall thickening is in abnormal range with no plaque formations present.     ECG (see tracing):  normal sinus rhythm    Arterial Elasticity per age and gender (see printout):   C1 15.8 mL/mmHg x 10  normal   C2 4.8 mL/mmHg x 100 borderline   Supine blood pressure: 128/86 mmHg     Test results: Arterial elasticity of the large size arteries is in normal range after adjusting for age and gender. Arterial elasticity of the small size arteries is in borderline range after adjusting for age and gender.     Simmons disease score: 6    Sarah Wilson

## 2024-11-21 NOTE — LETTER
11/21/2024      RE: Speedy Carlson  [address] [facility] [address]       Dear Colleague,    Thank you for the opportunity to participate in the care of your patient, Speedy Carlson, at the [facility] FOR CARDIOVASCULAR DISEASE PREVENTION [facility]. Please see a copy of my visit note below.      [facility] Cardiovascular Disease Prevention - Exam Note    Active Problems   Patient Active Problem List    Diagnosis Date Noted     Mild intermittent asthma without complication 02/17/2021     Priority: Medium     Coronary artery disease due to lipid rich plaque      Priority: Medium     Hx of obesity 01/06/2021     Priority: Medium     Working with medical weight management  Started at weight 252 lbs    Phentermine: not appropriate due to cardiac history   Topiramate: Side effects:   Qsymia: Not appropraite due to the above  Contrave: already taking   GLP1 agonist: didn't tolerate  Orlistat: Greater potential for GI issues, so likely not a good option and also doesn't have high fat diet       HANNAH (obstructive sleep apnea)- severe (AHI 52) 02/26/2020     Priority: Medium     Home Sleep Study: 04/16/2019 AHI 52, RDI/DEMETRIUS of 52 per hour. The low O2 sat is 72%. This is associated with continuous loud snoring and spiking desaturation and what might be a REM pattern.       Hypovitaminosis D 01/24/2019     Priority: Medium     Insomnia 01/24/2019     Priority: Medium     Tinnitus 01/24/2019     Priority: Medium     Chronic idiopathic urticaria 10/04/2016     Priority: Medium     Formatting of this note might be different from the original.  Overview:   dematographic component  Formatting of this note might be different from the original.  dematographic component       Family history of early CAD 04/26/2016     Priority: Medium     ED (erectile dysfunction) 02/09/2015     Priority: Medium     Hypertension 02/09/2015      Priority: Medium     Advance directive on file 08/20/2014     Priority: Medium     See scanned form - dated 5/7/2008       Seborrhea capitis 05/31/2011     Priority: Medium     Hyperlipidemia LDL goal <130 10/31/2010     Priority: Medium     Tear film insufficiency 03/06/2006     Priority: Medium     Other acne 01/28/2004     Priority: Medium       Reason For Visit   Patient here for [facility] early detection of atherosclerosis and CVD exam.    Pain Evaluation  Current history of pain associated with this visit is: denied    Cardiac risk factors:    - age      - smoking      - elevated BMI        + Family history CVD         - Diet           + Hypertension    HPI   Speedy Carlson is a 63 year old year old male with a history of CAD on CAC score of 58, 56th percentile in 2022, hypertension, hyperlipidemia, history of elevated BMI, sleep apea, and ED. His family history of CV disease includes his mother having hypertension, hyperlipidemia and CAD, both of his brothers have CAD and one of them has hyperlipidemia.  He is taking 80 mg of Pravachol and 5 mg of amlodipine. He dropped his amlodipine dose from 7.5 to 5 mg after he lost 40 pounds while taking Zepbound.  He was on simvastatin but had myalgias. He was seen in the [facility] CV Prevention clinic in 2021, score 5. His primary care provider is Dr. Steve Mcmahon at [facility] in [facility].  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  Today in clinic he denies chest pain at rest, with activity, while sleeping, SOB at rest, with activity or while sleeping, palpitations, lightheadedness, lower leg edema, calf cramps, indigestion, headaches or issues with his memory.     Nutrition assessment per patient report:   Foods with fat/cholesterol (fried foods, fatty meats, junk food):   0    Fruits and vegetables (  cup cooked, 1 cup raw):  1-3 servings of vegetables/day, 1-3 servings of fruit/day  Caffeine (1 cup coffee, soda, etc):  4-5 ounces of coffee/day  Alcohol  servings (12 oz. beer, 4 oz. wine, 1  oz. in mixed drink):  1x/week 2-4 light beer  Special dietary habits:   avoids red meat and pasta, french fries  Typical breakfast:  protein shake                 Lunch: turkey sandwich or soup                 Dinner: vegetables, chicken, pork chops                 Snacks: nightime-cookies, ice cream                 Drinks:  water  Activity  Patient is active walking 20 minutes 5x/week, he gets 6,000-7,000/steps per day and some days 10,000 steps/ay    Sleep pattern: good    Laboratory Results Review  We discussed laboratory results today including lipids targets and how foods influence cholesterol.    Weight  His perceived healthy weight is 195-200 pounds.  A normal BMI of 25 is equal to 182 pounds.  The current BMI of 29.11 is overweight range.  A weight reduction speed of 2-3 lbs per month for men is recommended.    PMH   Past Medical History:   Diagnosis Date     Acute prostatitis 12/2004     Calculus of kidney 1994    no recurrence     Cervical radiculopathy 10/04/2016    R side     Class 1 obesity due to excess calories without serious comorbidity with body mass index (BMI) of 31.0 to 31.9 in adult 07/06/2020     Coronary artery disease due to lipid rich plaque     coronary arteryt calcium score of 22, 63rd percentile in 2016     Dry eye syndrome      Hypertension      Idiopathic urticaria      Impotence of organic origin      Insomnia      Mild intermittent asthma 1975    hx in childhood     Mixed hyperlipidemia      Obesity      HANNAH (obstructive sleep apnea)      Other acne      Prostatitis      Seborrhea capitis      Tinnitus      Varicella without mention of complication        PSH  Past Surgical History:   Procedure Laterality Date     COLONOSCOPY  2012     COLONOSCOPY N/A 4/28/2022    Procedure: COLONOSCOPY;  Surgeon: Echo Caldera MD;  Location:  GI     DENTAL SURGERY  1980    wisdom teeth     ENDOSCOPY DRUG INDUCED SLEEP N/A 6/3/2020    Procedure:  DRUG-INDUCED SLEEP ENDOSCOPY;  Surgeon: Gladys Marroquin MD;  Location: [facility] OR     IMPLANT GENERATOR STIMULATOR (LOCATION) Right 10/28/2020    Procedure: INSERTION, PULSE GENERATOR, NEUROSTIMULATOR;  Surgeon: Gladys Marroquin MD;  Location: [facility] OR     PHOTOREFRACTIVE KERATECTOMY  2000    s/p Lasik surgery     TONSILLECTOMY  1971    s/p Tonsillectomy       Current Meds   Current Outpatient Medications   Medication Sig Dispense Refill     albuterol (PROAIR HFA/PROVENTIL HFA/VENTOLIN HFA) 108 (90 Base) MCG/ACT inhaler Inhale 1-2 puffs into the lungs every 4 hours as needed for shortness of breath, wheezing or cough 18 g 11     amLODIPine (NORVASC) 5 MG tablet Take 5 mg by mouth every morning        budeson-glycopyrrol-formoterol (BREZTRI AEROSPHERE) 160-9-4.8 MCG/ACT AERO inhaler Inhale 2 puffs into the lungs 2 times daily. 10.7 g 5     montelukast (SINGULAIR) 10 MG tablet Take 1 tablet (10 mg) by mouth at bedtime 90 tablet 3     pravastatin (PRAVACHOL) 40 MG tablet Take 40 mg by mouth every morning        tirzepatide-Weight Management (ZEPBOUND) 12.5 MG/0.5ML prefilled pen Inject 0.5 mLs (12.5 mg) subcutaneously every 7 days. 2 mL 3     zolpidem (AMBIEN) 5 MG tablet Take 5 mg by mouth nightly as needed for sleep          Allergies      Allergies   Allergen Reactions     Liraglutide GI Disturbance     Saxenda      Vitamin D3 [Cholecalciferol] Rash     Statins      Myalgias      Statins       Azithromycin Rash     Skin peeling       Family Hx   Family History   Problem Relation Age of Onset     Breast Cancer Mother         at age  45     Depression Mother      Obesity Mother      Eye Disorder Mother         cataracts     Hyperlipidemia Mother      Hypertension Mother      Coronary Artery Disease Mother         First MI betweeen 61 and 66 year of age, 3 MIs in past 15 years     Pulmonary Embolism Mother         on Eliquis     Cancer Father         lung, smoker     Coronary Artery Disease Brother 30       "  MI at age 30, pericarditis then MI     Coronary Artery Disease Brother 48        s/p MI, s/p CABG x 4 at age 48, smoker     Hyperlipidemia Brother      Obesity Brother         Has lost over 100 pounds       Social History    He is      Tobacco History  History   Smoking Status     Never   Smokeless Tobacco     Never       ROS  General:  WDWN in NAD  EENT:  Denies visual disturbances, epistaxis, sore throat  Respiratory:  Denies SOB, cough, sputum production  Cardiovascular:  see HPI  GI:  Denies nausea, vomiting, hematemesis, melena  :  denies hematuria, dysuria  Skin:  Denies rashes, lesions or open wounds  Psych:  Pleasant affect    Vital Signs   /87 (Cuff Size: Adult Regular)   Ht 1.816 m (5' 11.5\")   Wt 96 kg (211 lb 11.2 oz)   BMI 29.11 kg/m        Waist: 43 inches  Hip: 44 inches    Physical Exam   In general, the patient is a pleasant male in no apparent distress   HEENT: NC/AT, PERRLA, EOMI, sclerae white, not injected. Nares clear, pharynx without erythema or exudate, dentition intact    Neck: No adenopathy, no thyromegaly, carotids +4/4 bilaterally without bruits,  no jugular venous distension   Lungs: Breath sounds clear bilaterally, without crackles, ronchi, or wheezes  Cor: RRR, S1S2 without murmur, rub, click, or gallop, the PMI is in the 5th ICS in the midclavicular line  Abdomen: Soft, nontender, nondistended, BS+ in all 4 quadrants, without hepatomegaly, no aorta or renal artery bruits  Extremities: No clubbing, cyanosis, or edema. DP and PT pulses +2/4 bilaterally    The 10-year ASCVD risk score (Roachalexander PARADA Jr., et al., 2013) is: BISHOP score 10.4%  Values used to calculate the score:   Age: 63 year old   Sex: male   Is Non- : No   Diabetic: No   Tobacco smoker: No   Systolic Blood Press: 129 mmHg   Is BP treated: Yes   HDL Cholesterol: 46 mg/dL   Total Cholesterol: 138 mg/dL    Recent Labs  Lab Results   Component Value Date    GLC 85 11/21/2024    GLC 82 " "08/18/2021    GLC 97 08/12/2014      Lab Results   Component Value Date    NTBNP 15 01/21/2021     No results found for: \"NTBNPI\"   Lab Results   Component Value Date    UCRR 96 01/21/2021      Lab Results   Component Value Date    MICROL <5 01/21/2021      No results found for: \"MICROALBUMIN\"   Lab Results   Component Value Date    CRP <2.9 01/14/2021      Lab Results   Component Value Date    CHOL 135 11/21/2024    CHOL 140 11/13/2007      Lab Results   Component Value Date    TRIG 50 11/21/2024    TRIG 123 11/13/2007      Lab Results   Component Value Date    HDL 46 11/21/2024    HDL 36 (L) 11/13/2007      Lab Results   Component Value Date    LDL 79 11/21/2024    LDL 80 11/13/2007      Lab Results   Component Value Date    VLDL 25 11/13/2007      Lab Results   Component Value Date    CHOLHDLRATIO 3.9 11/13/2007     Lab Results   Component Value Date    NHDL 89 11/21/2024        Assessment:    Cardiovascular:  Asymptomatic, he is not complaining of chest pain, EKG revealed NSR with 1st degree HB, HR 83 bpm, no plaque detected in carotid arteries    Blood Pressure:  124-129/83-87 mmHg, he takes 5 mg of amlodipine     Lipids:  He takes 80 mg of Pravachol    Latest Ref Rng 10/11/2024  10:24 AM 11/21/2024  12:28 PM   BP WT  CHOL      BMI      Cholesterol <200 mg/dL  135    HDL Cholesterol >=40 mg/dL  46    LDL Cholesterol Calculated <100 mg/dL  79    Triglycerides <150 mg/dL  50      Glucose: 85    Sleep pattern:  Sleep hygiene reviewed during clinic visit, handout given to patient    Weight Management: BMI 29.11, encouraged increased CV exercise routine    Return to Clinic: 3-5 years    Health Habit Summary:  Nutrition: Heart Healthy Eating:  most of the time   Exercise:  regularly active  Weight:  overweight range  Tobacco Use:   remote    This case was presented to Dr. Blum and Dr. Thais Dick during our weekly conference.     50 minutes spent on the date of the encounter doing (chart review/review of outside " records/review of test results/interpretation of tests/patient visit/documentation/discussion with other provider(s)   RORO Mckeon CNP       CC  Patient Care Team:  Steve Mcmahno MD as PCP - General (Family Medicine)  Bloch, Lauren Turner, [facility] as Pharmacist (Pharmacist)  Gladys Marroquin MD as MD (Otolaryngology)  Nilay Griffin MD as MD (Urology)  Cinthia Graham PA as Physician Assistant (Urology)  Xavier Umanzor MD as MD (Pulmonary Disease)  Mary Childers PA-C as Assigned Surgical Provider  [facility] as Assigned PCP  Cecilia Grigsby PA-C as Assigned Cancer Care Provider  Mark Foreman MD as Sleep Provider (Pulmonary Disease)  Romeo Brooks APRN CNP as Nurse Practitioner (Cardiovascular Disease)  Xavier Umanzor MD as Assigned Pulmonology Provider  ROMEO BROOKS      [facility] Test Results    WALKING BLOOD PRESSURE RESPONSE (3 minute, 5 MET level walk)   Pre BP: 120/72 mmHg  3 min BP: 160/66 mmHg  1 min post BP: 140/72 mmHg    Pre HR: 70 bpm  3 min HR: 105 bpm  1 min post HR: 76 bpm     Test results: Walking blood pressure response to 3 minutes activity is in abnormal range due to increase in blood pressure from the baseline more then 30 points.     RETINAL VASCULAR ASSESSMENT   Left Eye Abnormality:  none  AV Ratio: 0.8    Right Eye Abnormality:  none  AV Ratio: 0.8     Retinal Assessment:  normal    ABDOMINAL AORTA ULTRASOUND (< 2.5 normal, borderline 2.5-2.9, abnormal > 3)   SupraIliac 1.75 cm    SupraRenal 1.91 cm    InfraRenal Proximal 2.12 cm    InfraRenal Distal 1.87 cm      Abdominal Aorta Assessment:  normal    LEFT VENTRICULAR ULTRASOUND MEASUREMENTS (adjusted for BSA)  LVIDD 49.8 mm   Septa 8.7 mm   Posterior 8.9 mm     Left Ventricular US Assessment:  normal    Carotid Artery IMT measurements report and plaques in the small area examined:   Left IMT 1.03 mm  Plaques none    Right IMT 0.861 mm   Plaques none     Test results: Carotid arteries wall thickening is in abnormal range with no plaque formations present.     ECG (see tracing):  normal sinus rhythm    Arterial Elasticity per age and gender (see printout):   C1 15.8 mL/mmHg x 10  normal   C2 4.8 mL/mmHg x 100 borderline   Supine blood pressure: 128/86 mmHg     Test results: Arterial elasticity of the large size arteries is in normal range after adjusting for age and gender. Arterial elasticity of the small size arteries is in borderline range after adjusting for age and gender.     Simmons disease score: 6    Sarah Wilson      Please do not hesitate to contact me if you have any questions/concerns.     Sincerely,     RORO Mckeon CNP

## 2024-11-21 NOTE — PROGRESS NOTES
[facility] Prevention - Exam Note    Active Problems   Patient Active Problem List    Diagnosis Date Noted    Mild intermittent asthma without complication 02/17/2021     Priority: Medium    Coronary artery disease due to lipid rich plaque      Priority: Medium    Hx of obesity 01/06/2021     Priority: Medium     Working with medical weight management  Started at weight 252 lbs    Phentermine: not appropriate due to cardiac history   Topiramate: Side effects:   Qsymia: Not appropraite due to the above  Contrave: already taking   GLP1 agonist: didn't tolerate  Orlistat: Greater potential for GI issues, so likely not a good option and also doesn't have high fat diet      HANNAH (obstructive sleep apnea)- severe (AHI 52) 02/26/2020     Priority: Medium     Home Sleep Study: 04/16/2019 AHI 52, RDI/DEMETRIUS of 52 per hour. The low O2 sat is 72%. This is associated with continuous loud snoring and spiking desaturation and what might be a REM pattern.      Hypovitaminosis D 01/24/2019     Priority: Medium    Insomnia 01/24/2019     Priority: Medium    Tinnitus 01/24/2019     Priority: Medium    Chronic idiopathic urticaria 10/04/2016     Priority: Medium     Formatting of this note might be different from the original.  Overview:   dematographic component  Formatting of this note might be different from the original.  dematographic component      Family history of early CAD 04/26/2016     Priority: Medium    ED (erectile dysfunction) 02/09/2015     Priority: Medium    Hypertension 02/09/2015     Priority: Medium    Advance directive on file 08/20/2014     Priority: Medium     See scanned form - dated 5/7/2008      Seborrhea capitis 05/31/2011     Priority: Medium    Hyperlipidemia LDL goal <130 10/31/2010     Priority: Medium    Tear film insufficiency 03/06/2006     Priority: Medium    Other acne 01/28/2004     Priority: Medium       Reason For Visit   Patient here for [facility] early detection  of atherosclerosis and CVD exam.    Pain Evaluation  Current history of pain associated with this visit is: denied    Cardiac risk factors:    - age      - smoking      - elevated BMI        + Family history CVD         - Diet           + Hypertension    HPI   Speedy Carlson is a 63 year old year old male with a history of CAD on CAC score of 58, 56th percentile in 2022, hypertension, hyperlipidemia, history of elevated BMI, sleep apea, and ED. His family history of CV disease includes his mother having hypertension, hyperlipidemia and CAD, both of his brothers have CAD and one of them has hyperlipidemia.  He is taking 80 mg of Pravachol and 5 mg of amlodipine. He dropped his amlodipine dose from 7.5 to 5 mg after he lost 40 pounds while taking Zepbound.  He was on simvastatin but had myalgias. He was seen in the [facility] CV Prevention clinic in 2021, score 5. His primary care provider is Dr. Steve Mcmahon at [facility] in [facility].  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  Today in clinic he denies chest pain at rest, with activity, while sleeping, SOB at rest, with activity or while sleeping, palpitations, lightheadedness, lower leg edema, calf cramps, indigestion, headaches or issues with his memory.     Nutrition assessment per patient report:   Foods with fat/cholesterol (fried foods, fatty meats, junk food):   0    Fruits and vegetables (  cup cooked, 1 cup raw):  1-3 servings of vegetables/day, 1-3 servings of fruit/day  Caffeine (1 cup coffee, soda, etc):  4-5 ounces of coffee/day  Alcohol servings (12 oz. beer, 4 oz. wine, 1  oz. in mixed drink):  1x/week 2-4 light beer  Special dietary habits:   avoids red meat and pasta, french fries  Typical breakfast:  protein shake                 Lunch: turkey sandwich or soup                 Dinner: vegetables, chicken, pork chops                 Snacks: nightime-cookies, ice cream                 Drinks:  water  Activity  Patient is active walking 20  minutes 5x/week, he gets 6,000-7,000/steps per day and some days 10,000 steps/ay    Sleep pattern: good    Laboratory Results Review  We discussed laboratory results today including lipids targets and how foods influence cholesterol.    Weight  His perceived healthy weight is 195-200 pounds.  A normal BMI of 25 is equal to 182 pounds.  The current BMI of 29.11 is overweight range.  A weight reduction speed of 2-3 lbs per month for men is recommended.    PMH   Past Medical History:   Diagnosis Date    Acute prostatitis 12/2004    Calculus of kidney 1994    no recurrence    Cervical radiculopathy 10/04/2016    R side    Class 1 obesity due to excess calories without serious comorbidity with body mass index (BMI) of 31.0 to 31.9 in adult 07/06/2020    Coronary artery disease due to lipid rich plaque     coronary arteryt calcium score of 22, 63rd percentile in 2016    Dry eye syndrome     Hypertension     Idiopathic urticaria     Impotence of organic origin     Insomnia     Mild intermittent asthma 1975    hx in childhood    Mixed hyperlipidemia     Obesity     HANNAH (obstructive sleep apnea)     Other acne     Prostatitis     Seborrhea capitis     Tinnitus     Varicella without mention of complication        PSH  Past Surgical History:   Procedure Laterality Date    COLONOSCOPY  2012    COLONOSCOPY N/A 4/28/2022    Procedure: COLONOSCOPY;  Surgeon: Echo Caldera MD;  Location:  GI    DENTAL SURGERY  1980    wisdom teeth    ENDOSCOPY DRUG INDUCED SLEEP N/A 6/3/2020    Procedure: DRUG-INDUCED SLEEP ENDOSCOPY;  Surgeon: Gladys Marroquin MD;  Location:  OR    IMPLANT GENERATOR STIMULATOR (LOCATION) Right 10/28/2020    Procedure: INSERTION, PULSE GENERATOR, NEUROSTIMULATOR;  Surgeon: Gladys Marroquin MD;  Location: [facility] OR    PHOTOREFRACTIVE KERATECTOMY  2000    s/p Lasik surgery    TONSILLECTOMY  1971    s/p Tonsillectomy       Current Meds   Current Outpatient Medications   Medication  Sig Dispense Refill    albuterol (PROAIR HFA/PROVENTIL HFA/VENTOLIN HFA) 108 (90 Base) MCG/ACT inhaler Inhale 1-2 puffs into the lungs every 4 hours as needed for shortness of breath, wheezing or cough 18 g 11    amLODIPine (NORVASC) 5 MG tablet Take 5 mg by mouth every morning       budeson-glycopyrrol-formoterol (BREZTRI AEROSPHERE) 160-9-4.8 MCG/ACT AERO inhaler Inhale 2 puffs into the lungs 2 times daily. 10.7 g 5    montelukast (SINGULAIR) 10 MG tablet Take 1 tablet (10 mg) by mouth at bedtime 90 tablet 3    pravastatin (PRAVACHOL) 40 MG tablet Take 40 mg by mouth every morning       tirzepatide-Weight Management (ZEPBOUND) 12.5 MG/0.5ML prefilled pen Inject 0.5 mLs (12.5 mg) subcutaneously every 7 days. 2 mL 3    zolpidem (AMBIEN) 5 MG tablet Take 5 mg by mouth nightly as needed for sleep          Allergies      Allergies   Allergen Reactions    Liraglutide GI Disturbance     Saxenda     Vitamin D3 [Cholecalciferol] Rash    Statins      Myalgias      Statins      Azithromycin Rash     Skin peeling       Family Hx   Family History   Problem Relation Age of Onset    Breast Cancer Mother         at age  45    Depression Mother     Obesity Mother     Eye Disorder Mother         cataracts    Hyperlipidemia Mother     Hypertension Mother     Coronary Artery Disease Mother         First MI betweeen 61 and 66 year of age, 3 MIs in past 15 years    Pulmonary Embolism Mother         on Eliquis    Cancer Father         lung, smoker    Coronary Artery Disease Brother 30        MI at age 30, pericarditis then MI    Coronary Artery Disease Brother 48        s/p MI, s/p CABG x 4 at age 48, smoker    Hyperlipidemia Brother     Obesity Brother         Has lost over 100 pounds       Social History    He is      Tobacco History  History   Smoking Status    Never   Smokeless Tobacco    Never       ROS  General:  WDWN in NAD  EENT:  Denies visual disturbances, epistaxis, sore throat  Respiratory:  Denies SOB, cough, sputum  "production  Cardiovascular:  see HPI  GI:  Denies nausea, vomiting, hematemesis, melena  :  denies hematuria, dysuria  Skin:  Denies rashes, lesions or open wounds  Psych:  Pleasant affect    Vital Signs   /87 (Cuff Size: Adult Regular)   Ht 1.816 m (5' 11.5\")   Wt 96 kg (211 lb 11.2 oz)   BMI 29.11 kg/m        Waist: 43 inches  Hip: 44 inches    Physical Exam   In general, the patient is a pleasant male in no apparent distress   HEENT: NC/AT, PERRLA, EOMI, sclerae white, not injected. Nares clear, pharynx without erythema or exudate, dentition intact    Neck: No adenopathy, no thyromegaly, carotids +4/4 bilaterally without bruits,  no jugular venous distension   Lungs: Breath sounds clear bilaterally, without crackles, ronchi, or wheezes  Cor: RRR, S1S2 without murmur, rub, click, or gallop, the PMI is in the 5th ICS in the midclavicular line  Abdomen: Soft, nontender, nondistended, BS+ in all 4 quadrants, without hepatomegaly, no aorta or renal artery bruits  Extremities: No clubbing, cyanosis, or edema. DP and PT pulses +2/4 bilaterally    The 10-year ASCVD risk score (Fresnoalexander PAARDA Jr., et al., 2013) is: BISHOP score 10.4%  Values used to calculate the score:   Age: 63 year old   Sex: male   Is Non- : No   Diabetic: No   Tobacco smoker: No   Systolic Blood Press: 129 mmHg   Is BP treated: Yes   HDL Cholesterol: 46 mg/dL   Total Cholesterol: 138 mg/dL    Recent Labs  Lab Results   Component Value Date    GLC 85 11/21/2024    GLC 82 08/18/2021    GLC 97 08/12/2014      Lab Results   Component Value Date    NTBNP 15 01/21/2021     No results found for: \"NTBNPI\"   Lab Results   Component Value Date    UCRR 96 01/21/2021      Lab Results   Component Value Date    MICROL <5 01/21/2021      No results found for: \"MICROALBUMIN\"   Lab Results   Component Value Date    CRP <2.9 01/14/2021      Lab Results   Component Value Date    CHOL 135 11/21/2024    CHOL 140 11/13/2007      Lab Results "   Component Value Date    TRIG 50 11/21/2024    TRIG 123 11/13/2007      Lab Results   Component Value Date    HDL 46 11/21/2024    HDL 36 (L) 11/13/2007      Lab Results   Component Value Date    LDL 79 11/21/2024    LDL 80 11/13/2007      Lab Results   Component Value Date    VLDL 25 11/13/2007      Lab Results   Component Value Date    CHOLHDLRATIO 3.9 11/13/2007     Lab Results   Component Value Date    NHDL 89 11/21/2024        Assessment:    Cardiovascular:  Asymptomatic, he is not complaining of chest pain, EKG revealed NSR with 1st degree HB, HR 83 bpm, no plaque detected in carotid arteries    Blood Pressure:  124-129/83-87 mmHg, he takes 5 mg of amlodipine     Lipids:  He takes 80 mg of Pravachol    Latest Ref Rng 10/11/2024  10:24 AM 11/21/2024  12:28 PM   BP WT  CHOL      BMI      Cholesterol <200 mg/dL  135    HDL Cholesterol >=40 mg/dL  46    LDL Cholesterol Calculated <100 mg/dL  79    Triglycerides <150 mg/dL  50      Glucose: 85    Sleep pattern:  Sleep hygiene reviewed during clinic visit, handout given to patient    Weight Management: BMI 29.11, encouraged increased CV exercise routine    Return to Clinic: 3-5 years    Health Habit Summary:  Nutrition: Heart Healthy Eating:  most of the time   Exercise:  regularly active  Weight:  overweight range  Tobacco Use:   remote    This case was presented to Dr. Blum and Dr. Thais Dick during our weekly conference.     50 minutes spent on the date of the encounter doing (chart review/review of outside records/review of test results/interpretation of tests/patient visit/documentation/discussion with other provider(s)   RORO Mckeon CNP       CC  Patient Care Team:  Steve Mcmahon MD as PCP - General (Family Medicine)  Bloch, Lauren Turner, RPH as Pharmacist (Pharmacist)  Gladys Marroquin MD as MD (Otolaryngology)  Nilay Griffin MD as MD (Urology)  Santos, MARI Arreola as Physician Assistant (Urology)  Xavier Umanzor  MD Juvencio as MD (Pulmonary Disease)  Mary Childers PA-C as Assigned Surgical Provider  [facility] as Assigned PCP  Cecilia Grigsby PA-C as Assigned Cancer Care Provider  Mark Foreman MD as Sleep Provider (Pulmonary Disease)  Romeo Brooks APRN CNP as Nurse Practitioner (Cardiovascular Disease)  Xavier Umanzor MD as Assigned Pulmonology Provider  ROMEO BROOKS

## 2024-11-22 LAB
APO A-I SERPL-MCNC: 57 MG/DL
ATRIAL RATE - MUSE: 83 BPM
CREAT UR-MCNC: 123 MG/DL
DIASTOLIC BLOOD PRESSURE - MUSE: NORMAL MMHG
INTERPRETATION ECG - MUSE: NORMAL
MICROALBUMIN UR-MCNC: <12 MG/L
MICROALBUMIN/CREAT UR: NORMAL MG/G{CREAT}
P AXIS - MUSE: -12 DEGREES
PR INTERVAL - MUSE: 220 MS
QRS DURATION - MUSE: 114 MS
QT - MUSE: 370 MS
QTC - MUSE: 434 MS
R AXIS - MUSE: 15 DEGREES
SYSTOLIC BLOOD PRESSURE - MUSE: NORMAL MMHG
T AXIS - MUSE: 33 DEGREES
VENTRICULAR RATE- MUSE: 83 BPM

## 2024-12-03 NOTE — PATIENT INSTRUCTIONS
Screening Results Summary Report     [facility] for Cardiovascular Disease Prevention    Thank you for choosing to participate in the prevention screening offered at the [facility]. Prevention screening is important part of health care.  Atherosclerosis may result in heart attacks, strokes, heart failure, peripheral artery disease and shortened life expectancy. The risk for premature development of this disease is both genetic (family history) and environmental (diet, exercise, lifestyle, etc.).  Goals of your cardiovascular prevention screening include detecting the earliest signs of blood vessel or heart abnormalities, and identifying markers for risk that can be treated if identified early. Recommendations are included to improve health habits. In some cases medication may be recommended to help slow progression of disease. Our goal is to assist you in prevention of a heart attack, stroke and other cardiovascular diseases that are the major cause of illness and mortality in our society.    Your total cholesterol and LDL (bad cholesterol) results are in the  optimal  range. Your other cholesterol numbers are also at goal.  We recommend continuation of ongoing health habit modification (heart healthy nutrition, maintaining your weight within a normal weight range and an exercise routine) to maintain these levels.  An ideal weight range is a body mass index of 25 or less. Continue to take 80 mg of pravachol.  Your lipoprotein a level is 57 (normal range is <29).    2.  A BMI (body mass index) of less than 25 helps to lower the risk of heart attack, stroke, and diabetes.  Your BMI is 29.11  The closer you can get to a BMI of 25 and still feel healthy for your musculature and frame size, the better.  Weight reduction, even small amounts (5-10 pounds), can help improve cardiovascular health.  A dramatic fluctuation in weight (up and down weight) increases your cardiovascular risk and is not recommended.   Overall, a healthy stable weight is preferred.    3.  Your arterial elasticity (artery stiffness) is borderline and may be a normal variation or an early indication of the development of vascular (blood vessel) disease and high blood pressure. Statin cholesterol medications, some blood pressure medications and healthy living habits particularly exercise are helpful to preserve artery elasticity. Continue to monitor your blood pressure one/week, record results and take those results to your next primary care clinic visit.  Omron is good home blood pressure monitor brand to purchase.  This blood pressure cuff machine can be purchased at [employer], [employer] or [employer].     4. Your blood pressure with 3 minutes of moderate (5 met level) treadmill walking increased 40 mmHg (--> systolic-top number) indicating an abnormal rise. This rise in blood pressure can be related to elevated weight, lack of physical exercise/fitness or reduced blood vessel function, which is linked to the presence of or the risk of development of high blood pressure over time. No treatment is needed at this time.      5. Your diet is fairly heart healthy and well balanced. We recommend increasing your intake of vegetables to 4-5 servings/day and increasing your fruit intake to 3-4 sevings/day. Incorporating healthy fats of the Mediterranean diet into your diet is also a healthy idea. Eating salmon and extra virgin olive oit are good examples of healthy fats. Nutrients found in fruit, vegetables and whole grains have been shown to be beneficial for the long-term health of your heart and blood vessels. Monitoring your portion sizes is also a good idea.    6.  The American Heart Association recommends 150 minutes of exercise per week, including strength (resistance) training.  Regular exercise can help maintain or lower cholesterol, blood pressure, blood glucose and improve the health of your heart and blood vessels. We recommend increasing your  exercise routine to 5 days per week and adding a cardiovascular component to your exercise routine.  Always exercise within your comfort zone (no chest pain, able to talk comfortably).     7.  We suggest that you consider incorporating 4-7-8 relaxation breathing, mindfulness stress reduction, meditation, yoga,and/or aromatherapy into your healthy lifestyle routine. All of these integrative therapies have been shown to be useful in reducing stress and promoting health. The website for the [facility] the [facility] is [email or web address]. Taking Charge of Your Health and Wellbeing is a wonderful assessment tool to learn more about your wellbeing.    8.  Return to clinic in 3-5 years.    Thank you for choosing to participate in the prevention screening at [facility] CV Prevention clinic.  Cardiovascular prevention screening is important. Atherosclerosis may result in heart attacks, strokes, heart failure, peripheral artery disease.    Aliya Garber, DNP, APRN, FNP-C

## 2024-12-09 ENCOUNTER — VIRTUAL VISIT (OUTPATIENT)
Dept: PHARMACY | Facility: CLINIC | Age: 64
End: 2024-12-09
Attending: PHARMACIST
Payer: COMMERCIAL

## 2024-12-09 ENCOUNTER — TELEPHONE (OUTPATIENT)
Dept: ENDOCRINOLOGY | Facility: CLINIC | Age: 64
End: 2024-12-09
Payer: COMMERCIAL

## 2024-12-09 VITALS — BODY MASS INDEX: 28.88 KG/M2 | WEIGHT: 210 LBS

## 2024-12-09 DIAGNOSIS — G47.00 INSOMNIA, UNSPECIFIED TYPE: ICD-10-CM

## 2024-12-09 DIAGNOSIS — J45.20 MILD INTERMITTENT ASTHMA WITHOUT COMPLICATION: ICD-10-CM

## 2024-12-09 DIAGNOSIS — E78.5 HYPERLIPIDEMIA LDL GOAL <130: Chronic | ICD-10-CM

## 2024-12-09 DIAGNOSIS — Z86.39 HX OF OBESITY: Primary | ICD-10-CM

## 2024-12-09 DIAGNOSIS — I10 PRIMARY HYPERTENSION: Chronic | ICD-10-CM

## 2024-12-09 RX ORDER — PRAVASTATIN SODIUM 80 MG/1
80 TABLET ORAL EVERY MORNING
COMMUNITY
Start: 2024-11-21

## 2024-12-09 RX ORDER — TRAZODONE HYDROCHLORIDE 150 MG/1
150 TABLET ORAL EVERY EVENING
COMMUNITY
Start: 2024-11-25

## 2024-12-09 RX ORDER — DOCUSATE SODIUM 100 MG/1
200 CAPSULE, LIQUID FILLED ORAL 2 TIMES DAILY
COMMUNITY

## 2024-12-09 NOTE — PROGRESS NOTES
Medication Therapy Management (MTM) Encounter    ASSESSMENT:                            Medication Adherence/Access: No issues identified.    Weight Management:   Speedy would benefit from continuing pharmacotherapy with Zepbound 12.5 mg weekly for weight management given he is tolerating, is effective, and has history of obesity.  Recommend maintaining GLP1/GIP therapy as data to support most significant weight loss therapy available. Speedy is experiencing benefit from reduction in food noise, increased satiety and reduction of cravings. Education provided on continued dietary and behavioral modifications with focus on protein and fiber intake to optimize effectiveness of Zepbound and reduce constipation associated with therapy. Could consider Metamucil fiber therapy to meet fiber goal.     Hypertension   At goal of <140/90 mmHg.      Hyperlipidemia   At LDL goal of <130 mg/dL.     Asthma   Stable.      Insomnia   Stable.      PLAN:                            Continue Zepbound 12.5 mg weekly    Protein goal:  20-30 grams of protein per meal, at least 60 grams per day     Fiber goal:  20 - 25 grams per day    - consider Metamucil fiber therapy if not meeting daily goal     Follow-up:  - MTM check in 6 months    SUBJECTIVE/OBJECTIVE:                          Issa Carlson is a 64 year old male seen for an initial visit. He was referred to me from Mary Childers PA-C.     Reason for visit: comprehensive medication review and Zepbound management.    Allergies/ADRs: Reviewed in chart  Past Medical History: Reviewed in chart  Tobacco: He reports that he has never smoked. He has never used smokeless tobacco.  Alcohol: 2-3 beverages / week  Caffeine: 4 Oz coffee each morning    Medication Adherence/Access:  - Patient takes medications directly from bottles.  - Patient takes medications 2 time(s) per day.   - Per patient, misses medication 1 time(s) per month.   - Injects weekly on Sunday / Monday, typically Sunday, Mondays  "when delayed (once every 2 months)      Weight Management   - Zepbound 12.5 mg once weekly (had 3 doses thus far)   - med start:  10/2023, dose start: 11/2024, weight at start: 242 lb   - Docusate 200 mg twice daily with large glass of water    Patient reports no current medication side effects.  Was at 10 mg Zepbound for some time, about 6 months but started becoming less effective (still felt the effect) / plateau, he can tell the difference with the 12.5 mg.  Feels 12.5 mg has greater appetite suppression / portions less, less cravings.  Takes stool softeners which is effective for constipation, current is BM about once daily or 5 / week, somewhat hard still but no blood or straining. Feels gets enough fiber, eats a lot of vegetables. Discussed Metamucil for future if needed.     Nutrition/Eating Habits:   Meal times:      - Breakfast:  30 gram protein shake, OJ small glass     - Lunch:  cup of soup, turkey sandwich    - Dinner:  smaller now, chicken, turkey, grilled cheese/ soup    - Snacks:  cookies (oreo), PB on saltine cracker, chocolate (8-9 pm)  - Water per day:  min 96 Oz water per day, caffeine free soda here and there   Sleep:  7 hours ( restful)  Exercise/Activity: walk dogs.   Medications Tried/Failed:  Contrave: effect wore off    - Last provider visit: 10/9/24 Mary Childers PA-C    - Next provider visit: 5/7/25 Mary Childers PA-C      Initial Consult Weight: 253 lb     Current weight today: 210 lbs 0 oz   Cumulative Weight Loss: -43 lb, -17% from baseline    Wt Readings from Last 5 Encounters:   12/09/24 210 lb (95.3 kg)   11/21/24 211 lb 11.2 oz (96 kg)   10/11/24 214 lb 14.4 oz (97.5 kg)   10/09/24 212 lb (96.2 kg)   04/03/24 228 lb (103.4 kg)     Estimated body mass index is 28.88 kg/m  as calculated from the following:    Height as of 11/21/24: 5' 11.5\" (1.816 m).    Weight as of this encounter: 210 lb (95.3 kg).    Hypertension   - Amlodipine 5 mg once daily      Patient reports no current " medication side effects  Patient self monitors blood pressure.  Home BP monitoring 127/82 mmHg common .  Had cardiology work up a few weeks ago and everything looks good.  Had some dizziness when on 7.5 mg amlodipine, does not have this with 5 mg, cardiologist said could consider decreasing more but he is content at current dose.      Hyperlipidemia   - Pravastatin 80 mg daily     Patient reports no significant myalgias or other side effects.    Lab Results   Component Value Date    LDL 79 11/21/2024      Asthma   - Albuterol 2 puffs as needed,   - a few times a week, prior to starting Breztri was everyday  - Breztri 2 puffs twice daily    - life changing, has been very effective, significant reduction in albuterol  - Singulair 10 mg at bedtime   - effective     Patient rinses their mouth after using steroid inhaler.   Patient reports no current medication side effects.      Triggers include: cold air and weather, after eating .  Patient reports the following symptoms: none.     Insomnia   - Trazodone 150 mg every evening    - Zolpidem 5 mg at bedtime, 2-3 doses per week      Patient reports trouble staying asleep, trouble falling asleep, and can only sleep 7 hours (restful).  Current regimen is effective, no morning grogginess.        Today's Vitals: Wt 210 lb (95.3 kg)   BMI 28.88 kg/m    ----------------    I spent 40 minutes with this patient today. All changes were made via collaborative practice agreement with Mary Childers. A copy of the visit note was provided to the patient's provider(s).    A summary of these recommendations was sent via [employer].    Nataly Mcgregor, PharmD    Medication Therapy Management Pharmacist    Telemedicine Visit Details  The patient's medications can be safely assessed via a telemedicine encounter.  Type of service:  Telephone visit  Originating Location (pt. Location): Home    Distant Location (provider location):  On-site  Start Time: 9:05 AM  End Time: 9:45 AM      Medication Therapy Recommendations  No medication therapy recommendations to display

## 2024-12-09 NOTE — PATIENT INSTRUCTIONS
"Recommendations from MTM Pharmacist visit:                                                    MTM (medication therapy management) is a service provided by a clinical pharmacist designed to help you get the most of out of your medicines.  You may be sent a phone or email survey evaluating today's visit.  Please provide feedback you have for the service he received today if you are able.    Continue Zepbound 12.5 mg weekly    Protein goal:  20-30 grams of protein per meal, at least 60 grams per day     Fiber goal:  20 - 25 grams per day    - consider Metamucil fiber therapy if not meeting daily goal     Follow-up:  - MTM check in 6 months    It was great speaking with you today.  I value your experience and would be very thankful for your time in providing feedback in our clinic survey. In the next few days, you may receive an email or text message from [facility] [employer] with a link to a survey related to your  clinical pharmacist.\"     To schedule another MTM appointment, please call the clinic directly ([facility] Phone Number: [phone] (schedules for [facility] and [facility] - providers, dietitians, health coaches) or you may call the MTM scheduling line at [phone] or toll-free at [phone].     My Clinical Pharmacist's contact information:                                                      Please feel free to contact me with any questions or concerns you have.      Nataly Mcgregor, PharmD    Medication Therapy Management Pharmacist   [facility]      Meal Replacement Shake Options:   *Protein Shake Criteria: no more than 210 Calories, at least 20 grams of protein, and less than 10 grams of sugar   Premier Protein (160 Calories, 30 g protein)  Slim Fast Advanced Nutrition (180 Calories, 20 g protein)  Muscle Milk, lactose-free, 17 oz bottle (210 Calories, 30 g protein)  Integrated Supplements, no artificial sugars " (110 Calories, 20 g protein)  Boost/Ensure Max (160 calories, 30 gm protein)   Fairlife Protein Shakes (160-230 calories, 26-42 gm protein)  Aldi's [facility] Protein Powder (180 calories, 30 gm protein)   Orgain Protein Shakes (130-160 calories, 20-26 gm protein)     Meal Replacement Bar Options:  Quest Protein Bars (190 Calories, 20 g protein)  Built Bar (170 Calories, 15-20 g protein)  One Protein Bar (210 calories, 20 g protein)  [place] Signature Protein Bar ([employer]) (190 Calories, 21 g protein)  Pure Protein Bars (180 Calories, 21 g protein)    Low Calorie Frozen Meal:  Healthy Choice Power Bowls  Lean Cuisine  Smart Ones  Victoriano Connell      ---------------------------------------------------------------  Tips to Increase the Protein in Your Diet  You may need more protein in your diet to help you heal from an illness, surgery or wound. Extra protein can also help you gain weight. Here are some ideas for adding high-protein foods to your meals.  Meat and fish  Add chopped cooked meat to vegetables, salads, casseroles, soups, sauces and biscuit dough.  Use in omelets, soufflés, quiches, sandwich fillings and chicken or turkey stuffing.  Wrap in pie crust or biscuit dough to make a turnover.  Add to stuffed baked potatoes.  Make a dip with diced meat or flaked fish mixed with sour cream and spices.  Chopped, hard-cooked eggs  Add to salads.  Use for snacks and sandwich filling.  High-protein milk  To make high-protein milk, mix 1 quart whole milk with 1 cup powdered milk.  Add to cream soups, mashed potatoes, scrambled eggs, cereals and dried eggnog mix.  Use as an ingredient in puddings, custards, hot chocolate, milk shakes and pancakes.  Powdered milk  If you don't have any high-protein milk on hand, you can use powdered milk. Add 3 tablespoons to:  gravies, sauces, cream soups, mayonnaise  casseroles, meat patties, meatloaf, tuna salad, deviled ham  scalloped or mashed potatoes, creamed  spinach  scrambled eggs, egg salad  cereals  yogurt, milk drinks, ice cream, frozen desserts, puddings, custards.  Add 4 to 6 tablespoons powdered milk to make:  cream sauces  breads, muffins, pancakes, waffles, cookies, cakes  cream pies, frostings, cake fillings  fruit cobblers, bread or rice pudding, gelatin desserts.  For high-protein eggnog, add 3 to 6 tablespoons powdered milk to prepared eggnog.  Hard or soft cheese  Melt on sandwiches, breads, tortillas, hamburgers, hot dogs, other meats, vegetables, eggs and pies.  Grate into soups, chili, sauces, casseroles, vegetables, potatoes, rice, noodles or meatloaf.  Eat with toast or crackers, or melt for trinh dip.  Cottage cheese or ricotta cheese  Mix with or scoop on top of fruits and vegetables.  Add to casseroles, lasagna, spaghetti, noodles and egg dishes (omelets, scrambled eggs, soufflés).  Use in gelatin, pudding-type desserts, cheesecake and pancake batter.  Use to stuff crepes, pasta shells or manicotti.  Fruit yogurt  Blend with fruits for a fruit smoothie.  Use as a dip for fruits and vegetables.  Scoop on top of pancakes or waffles.  Tofu  Blend silken tofu with fruits and juices for a smoothie.  Add chunks of firm tofu to soups and stews, or crumble into meatloaf.  Blend dried onion soup mix into soft or silken tofu for dip.  Use pureed silken tofu for part of the mayonnaise, sour cream, cream cheese or ricotta cheese called for in recipes.  Beans  Use cooked beans or peas in soups, casseroles, pasta, tacos and burritos.  Nuts and seeds  Note: For children under 3, discuss with the child's care team.  Use in casseroles, breads, muffins, pancakes, cookies and waffles.  Sprinkle on fruits, cereals, ice cream, yogurt, vegetables and salads.  Mix with raisins, dried fruits and chocolate chips for a snack.  Nut butters  Note: For children under 3, discuss with the child's care team.  Spread on sandwiches, toast, muffins, crackers, waffles, pancakes and  "fruit slices.  Use as a dip for raw vegetables.  Blend with milk drinks, or swirl through ice cream, yogurt or hot cereal.  Nutrition supplements (nutrition bars, drinks and powders)  Add powders to milk drinks and desserts.  Mix with ice cream, milk and fruit for a high-protein milk shake.    For informational purposes only. Not to replace the advice of your health care provider. Clinically reviewed by Lizbeth Aguilera, EVELYN, DOMENIC, and the Clinical Nutrition Service Line. Copyright   2005 [facility]. All rights reserved. [employer] 332482 - REV 04/24.      -----------------------------------------------------------------------------------------------------------------  Learning About High-Protein Foods  What foods are high in protein?     The foods you eat contain nutrients, such as vitamins and minerals. Protein is a nutrient. Your body needs the right amount to stay healthy and work as it should. You can use the list below to help you make choices about which foods to eat.  Here are some examples of foods that are high in protein.  Dairy and dairy alternatives  Cheese  Milk  Soy milk  Yogurt (especially Greek)  Meat  Beef  Chicken  Ham  Lamb  Lunch meat  Pork  Sausage  Turkey  Other protein foods  Beans (black, garbanzo, kidney, lima)  Eggs  Hummus  Lentils  Nuts  Peanut butter and other nut butters  Peas  Soybeans  Tofu  Veggie or soy loida (Check the nutrition label for the amount of protein in each serving.)  Seafood  Anchovies  Cod  Crab  Halibut  [place]  Sardines  Shrimp  [place]  [place]  Tuna  Protein supplements  Bars (Check the nutrition label for the amount of protein in each serving.)  Drinks  Powders  Work with your doctor to find out how much of this nutrient you need. Depending on your health, you may need more or less of it in your diet.  Where can you learn more?  Go to https://www.healthwise.net/patiented  Enter P335 in the search box to learn more about \"Learning About High-Protein " "Foods.\"  Current as of: September 20, 2023               Content Version: 14.0    8378-9357 [employer].   Care instructions adapted under license by your healthcare professional. If you have questions about a medical condition or this instruction, always ask your healthcare professional. [employer] disclaims any warranty or liability for your use of this information.  Learning About Foods That Are Good Sources of Fiber  What foods are high in fiber?     The foods you eat contain nutrients, such as vitamins and minerals. Fiber is a nutrient. Your body needs the right amount to stay healthy and work as it should. You can use the list below to help you make choices about which foods to eat.  Here are some examples of foods that are good sources of fiber.  Fruits  Apple  Apricot  Avocado  Banana  Blackberries  Cherries  Melon  Pear  Raspberries  Grains  Amaranth  Barley  Bran cereal  [place]  Oat bran  Oatmeal  Quinoa  Rice (brown or wild)  Whole-grain bread  Whole-grain English muffin  Protein foods  Almonds  Beans (black, kidney, navy, sandoval)  Yandel seeds  Garbanzo beans  Lentils  Pumpkin seeds  Split peas  Sunflower seeds  Vegetables  Artichoke  Broccoli  [place] sprouts  Cabbage  Carrots  Cauliflower  Eggplant  Green peas  Kale  Pumpkin  Sweet potato  White potato  Work with your doctor to find out how much of this nutrient you need. Depending on your health, you may need more or less of it in your diet.  Where can you learn more?  Go to https://www.[employer].net/patiented  Enter F355 in the search box to learn more about \"Learning About Foods That Are Good Sources of Fiber.\"  Current as of: September 20, 2023  Content Version: 14.2 2024 [facility] [employer].   Care instructions adapted under license by your healthcare professional. If you have questions about a medical condition or this instruction, always ask your healthcare professional. [employer] disclaims any " warranty or liability for your use of this information.

## 2024-12-09 NOTE — Clinical Note
Dose increase to 12.5 mg Zepbound going well, no concerns from MTM, [facility]

## 2024-12-09 NOTE — NURSING NOTE
Current patient location:  at work in [place], [place]    Is the patient currently in the state of [place]? YES    Visit mode:TELEPHONE    If the visit is dropped, the patient can be reconnected by: TELEPHONE VISIT: Phone number: [phone]    Will anyone else be joining the visit? NO  (If patient encounters technical issues they should call [phone])    Are changes needed to the allergy or medication list? Pt declined allergy review and Pt declined med review    Are refills needed on medications prescribed by this physician?     Reason for visit: Medication Therapy Management    Laura LOVEF

## 2025-02-25 DIAGNOSIS — E66.811 CLASS 1 OBESITY WITH SERIOUS COMORBIDITY AND BODY MASS INDEX (BMI) OF 32.0 TO 32.9 IN ADULT, UNSPECIFIED OBESITY TYPE: ICD-10-CM

## 2025-02-25 NOTE — TELEPHONE ENCOUNTER
Last Written Prescription:   Disp Refills Start End DINESH   tirzepatide-Weight Management (ZEPBOUND) 12.5 MG/0.5ML prefilled pen 2 mL 3 10/9/2024 -- --   Sig - Route: Inject 0.5 mLs (12.5 mg) subcutaneously every 7 days. - Subcutaneous     ----------------------  Last Visit Date: 10/9/2024  [facility] Visit Date:    5/7/2025 1:00 PM (30 min)  Justin   Arrive by: 12:45 PM   RETURN MEDICAL WEIGHT MGMT    [facility] ([facility])   Mary Childers PA-C     ----------------------    Refill decision: Medication unable to be refilled by RN due to: Medication not on [facility], [facility], [facility] refill protocol          Request from pharmacy:  Requested Prescriptions   Pending Prescriptions Disp Refills    tirzepatide-Weight Management (ZEPBOUND) 12.5 MG/0.5ML prefilled pen 2 mL 3     Sig: Inject 0.5 mLs (12.5 mg) subcutaneously every 7 days.       There is no refill protocol information for this order

## 2025-04-01 DIAGNOSIS — J45.40 MODERATE PERSISTENT ASTHMA WITHOUT COMPLICATION: ICD-10-CM

## 2025-04-01 RX ORDER — BUDESONIDE, GLYCOPYRROLATE, AND FORMOTEROL FUMARATE 160; 9; 4.8 UG/1; UG/1; UG/1
2 AEROSOL, METERED RESPIRATORY (INHALATION) 2 TIMES DAILY
Qty: 10.7 G | Refills: 0 | Status: SHIPPED | OUTPATIENT
Start: 2025-04-01

## 2025-04-01 NOTE — TELEPHONE ENCOUNTER
Last Written Prescription Date:  10/11/24  Last Fill Quantity: 10.7g,  # refills: 5   Last office visit: 10/11/2024 ; last virtual visit: 1/30/2024 with prescribing provider:  dr mcgraw   Future Office Visit:           Requested Prescriptions   Pending Prescriptions Disp Refills    budeson-glycopyrrol-formoterol (BREZTRI AEROSPHERE) 160-9-4.8 MCG/ACT AERO inhaler 10.7 g 5     Sig: Inhale 2 puffs into the lungs 2 times daily.       There is no refill protocol information for this order

## 2025-04-12 ENCOUNTER — HEALTH MAINTENANCE LETTER (OUTPATIENT)
Age: 65
End: 2025-04-12

## 2025-05-07 ENCOUNTER — VIRTUAL VISIT (OUTPATIENT)
Dept: ENDOCRINOLOGY | Facility: CLINIC | Age: 65
End: 2025-05-07
Payer: COMMERCIAL

## 2025-05-07 VITALS — BODY MASS INDEX: 28.88 KG/M2 | WEIGHT: 210 LBS

## 2025-05-07 DIAGNOSIS — E66.811 CLASS 1 OBESITY WITH SERIOUS COMORBIDITY AND BODY MASS INDEX (BMI) OF 32.0 TO 32.9 IN ADULT, UNSPECIFIED OBESITY TYPE: Primary | ICD-10-CM

## 2025-05-07 ASSESSMENT — PAIN SCALES - GENERAL: PAINLEVEL_OUTOF10: NO PAIN (0)

## 2025-05-07 NOTE — LETTER
2025       RE: Speedy Carlson  [address] [facility] [address]     Dear Colleague,    Thank you for referring your patient, Speedy Carlson, to the [facility] [place] [facility]. Please see a copy of my visit note below.      Return Medical Weight Management Note     Speedy Carlson  MRN:  [id number]  :  1960  CAMILO:  2025    Dear Steve Mcmahon MD,    I had the pleasure of seeing your patient Speedy Carlson. He is a 64 year old male who I am continuing to see for treatment of obesity related to:        2019    10:06 AM   --   I have the following health issues associated with obesity Heart Disease    High Blood Pressure    High Cholesterol    Sleep Apnea   I have the following symptoms associated with obesity None of the above       Assessment & Plan  Problem List Items Addressed This Visit    None       Comprehensive weight management follow up visit    Weight loss from highest 253 to 210 today (17%)  Weight change since last visit: lost 2 lbs  Weight stable around 210, he would love to get to <200 but feels great where he is at also.     Currently taking the following medications that can help with weight loss/weight mgmt:  Zepbound 12.5mg tolerating well    Plan today:  Increase zepbound to 15mg weekly    Follow up plan:  Mary 6 months return MW    INTERVAL HISTORY:    CURRENT WEIGHT:   210 lbs 0 oz    Initial Weight (lbs): 253 lbs  Last Visits Weight: 96.2 kg (212 lb)  Cumulative weight loss (lbs): 43  Weight Loss Percentage: 17%        2025     9:01 AM   Changes and Difficulties   With regards to my diet, I am still struggling with: Sugar   I have made the following changes to my activity/exercise since my last visit: I started doing hot yoga about two moths ago. I go 2-3 times per week.             2025     9:01 AM   Weight Loss Medication History Reviewed With Patient   Are  you having any side effects from the weight loss medication that we have prescribed you? No       Office Visit on 11/21/2024   Component Date Value Ref Range Status     Ventricular Rate 11/21/2024 83  BPM Final     Atrial Rate 11/21/2024 83  BPM Final     MA Interval 11/21/2024 220  ms Final     QRS Duration 11/21/2024 114  ms Final     QT 11/21/2024 370  ms Final     QTc 11/21/2024 434  ms Final     P Axis 11/21/2024 -12  degrees Final     R AXIS 11/21/2024 15  degrees Final     T Axis 11/21/2024 33  degrees Final     Interpretation ECG 11/21/2024    Final                    Value:Sinus rhythm with 1st degree A-V block  When compared with ECG of 21-Jan-2021 14:42,  MA interval has increased  Confirmed by MD SIVA, SALOMÓN ([id number]) on 11/22/2024 1:00:52 PM         MEDICATIONS:   Current Outpatient Medications   Medication Sig Dispense Refill     albuterol (PROAIR HFA/PROVENTIL HFA/VENTOLIN HFA) 108 (90 Base) MCG/ACT inhaler Inhale 1-2 puffs into the lungs every 4 hours as needed for shortness of breath, wheezing or cough. 18 g 11     amLODIPine (NORVASC) 5 MG tablet Take 5 mg by mouth every morning        budeson-glycopyrrol-formoterol (BREZTRI AEROSPHERE) 160-9-4.8 MCG/ACT AERO inhaler Inhale 2 puffs into the lungs 2 times daily. 10.7 g 11     docusate sodium (COLACE) 100 MG capsule Take 200 mg by mouth 2 times daily.       pravastatin (PRAVACHOL) 80 MG tablet Take 80 mg by mouth every morning.       tirzepatide-Weight Management (ZEPBOUND) 12.5 MG/0.5ML prefilled pen Inject 0.5 mLs (12.5 mg) subcutaneously every 7 days. 2 mL 3     traZODone (DESYREL) 150 MG tablet Take 150 mg by mouth every evening.       zolpidem (AMBIEN) 5 MG tablet Take 5 mg by mouth nightly as needed for sleep            PHYSICAL EXAM:  Objective   Wt 95.3 kg (210 lb)   BMI 28.88 kg/m      Vitals - Patient Reported  Pain Score: No Pain (0)        GENERAL: alert and no distress  EYES: Eyes grossly normal to inspection.  No discharge or erythema, or  obvious scleral/conjunctival abnormalities.  RESP: No audible wheeze, cough, or visible cyanosis.    SKIN: Visible skin clear. No significant rash, abnormal pigmentation or lesions.  NEURO: Cranial nerves grossly intact.  Mentation and speech appropriate for age.  PSYCH: Appropriate affect, tone, and pace of words        Sincerely,    Mary Childers PA-C      10 minutes spent by me on the date of the encounter doing chart review, history and exam, documentation and further activities per the note    Virtual Visit Details    Type of service:  Video Visit     Originating Location (pt. Location): Home    Distant Location (provider location):  Off-site  Platform used for Video Visit: Melissa    The longitudinal plan of care for the diagnosis(es)/condition(s) as documented were addressed during this visit. Due to the added complexity in care, I will continue to support Issa in the subsequent management and with ongoing continuity of care.          Again, thank you for allowing me to participate in the care of your patient.      Sincerely,    Mary Childers PA-C

## 2025-05-07 NOTE — NURSING NOTE
Current patient location: at office in [place], [place]    Is the patient currently in the state of [place]? YES    Visit mode:VIDEO    If the visit is dropped, the patient can be reconnected by: VIDEO VISIT: Text to cell phone:   Telephone Information:   Mobile [phone]       Will anyone else be joining the visit? NO  (If patient encounters technical issues they should call [phone]    Are changes needed to the allergy or medication list? Pt stated no changes to allergies and Pt stated no med changes    Are refills needed on medications prescribed by this physician? YES zepbound    Reason for visit: RECHECK    Laura LOVEF

## 2025-05-07 NOTE — PROGRESS NOTES
Return Medical Weight Management Note     Speedy Carlson  MRN:  [id number]  :  1960  CAMILO:  2025    Dear Steve Mcmahon MD,    I had the pleasure of seeing your patient Speedy Carlson. He is a 64 year old male who I am continuing to see for treatment of obesity related to:        2019    10:06 AM   --   I have the following health issues associated with obesity Heart Disease    High Blood Pressure    High Cholesterol    Sleep Apnea   I have the following symptoms associated with obesity None of the above       Assessment & Plan   Problem List Items Addressed This Visit    None       Comprehensive weight management follow up visit    Weight loss from highest 253 to 210 today (17%)  Weight change since last visit: lost 2 lbs  Weight stable around 210, he would love to get to <200 but feels great where he is at also.     Currently taking the following medications that can help with weight loss/weight mgmt:  Zepbound 12.5mg tolerating well    Plan today:  Increase zepbound to 15mg weekly    Follow up plan:  Mary 6 months return MW    INTERVAL HISTORY:    CURRENT WEIGHT:   210 lbs 0 oz    Initial Weight (lbs): 253 lbs  Last Visits Weight: 96.2 kg (212 lb)  Cumulative weight loss (lbs): 43  Weight Loss Percentage: 17%        2025     9:01 AM   Changes and Difficulties   With regards to my diet, I am still struggling with: Sugar   I have made the following changes to my activity/exercise since my last visit: I started doing hot yoga about two moths ago. I go 2-3 times per week.             2025     9:01 AM   Weight Loss Medication History Reviewed With Patient   Are you having any side effects from the weight loss medication that we have prescribed you? No       Office Visit on 2024   Component Date Value Ref Range Status    Ventricular Rate 2024 83  BPM Final    Atrial Rate 2024 83  BPM Final    CO Interval 2024 220  ms Final    QRS Duration 2024 114  ms  Final    QT 11/21/2024 370  ms Final    QTc 11/21/2024 434  ms Final    P Axis 11/21/2024 -12  degrees Final    R AXIS 11/21/2024 15  degrees Final    T Axis 11/21/2024 33  degrees Final    Interpretation ECG 11/21/2024    Final                    Value:Sinus rhythm with 1st degree A-V block  When compared with ECG of 21-Jan-2021 14:42,  OH interval has increased  Confirmed by MD SIVA, SALOMÓN ([id number]) on 11/22/2024 1:00:52 PM         MEDICATIONS:   Current Outpatient Medications   Medication Sig Dispense Refill    albuterol (PROAIR HFA/PROVENTIL HFA/VENTOLIN HFA) 108 (90 Base) MCG/ACT inhaler Inhale 1-2 puffs into the lungs every 4 hours as needed for shortness of breath, wheezing or cough. 18 g 11    amLODIPine (NORVASC) 5 MG tablet Take 5 mg by mouth every morning       budeson-glycopyrrol-formoterol (BREZTRI AEROSPHERE) 160-9-4.8 MCG/ACT AERO inhaler Inhale 2 puffs into the lungs 2 times daily. 10.7 g 11    docusate sodium (COLACE) 100 MG capsule Take 200 mg by mouth 2 times daily.      pravastatin (PRAVACHOL) 80 MG tablet Take 80 mg by mouth every morning.      tirzepatide-Weight Management (ZEPBOUND) 12.5 MG/0.5ML prefilled pen Inject 0.5 mLs (12.5 mg) subcutaneously every 7 days. 2 mL 3    traZODone (DESYREL) 150 MG tablet Take 150 mg by mouth every evening.      zolpidem (AMBIEN) 5 MG tablet Take 5 mg by mouth nightly as needed for sleep            PHYSICAL EXAM:  Objective    Wt 95.3 kg (210 lb)   BMI 28.88 kg/m      Vitals - Patient Reported  Pain Score: No Pain (0)        GENERAL: alert and no distress  EYES: Eyes grossly normal to inspection.  No discharge or erythema, or obvious scleral/conjunctival abnormalities.  RESP: No audible wheeze, cough, or visible cyanosis.    SKIN: Visible skin clear. No significant rash, abnormal pigmentation or lesions.  NEURO: Cranial nerves grossly intact.  Mentation and speech appropriate for age.  PSYCH: Appropriate affect, tone, and pace of words        Sincerely,    Mary  Sonia Childers PA-C      10 minutes spent by me on the date of the encounter doing chart review, history and exam, documentation and further activities per the note    Virtual Visit Details    Type of service:  Video Visit     Originating Location (pt. Location): Home    Distant Location (provider location):  Off-site  Platform used for Video Visit: [email or web address]    The longitudinal plan of care for the diagnosis(es)/condition(s) as documented were addressed during this visit. Due to the added complexity in care, I will continue to support Issa in the subsequent management and with ongoing continuity of care.

## 2025-07-28 ENCOUNTER — VIRTUAL VISIT (OUTPATIENT)
Dept: PHARMACY | Facility: CLINIC | Age: 65
End: 2025-07-28
Attending: PHYSICIAN ASSISTANT
Payer: COMMERCIAL

## 2025-07-28 ENCOUNTER — TELEPHONE (OUTPATIENT)
Dept: ENDOCRINOLOGY | Facility: CLINIC | Age: 65
End: 2025-07-28
Payer: COMMERCIAL

## 2025-07-28 VITALS — WEIGHT: 206 LBS | HEIGHT: 72 IN | BODY MASS INDEX: 27.9 KG/M2

## 2025-07-28 DIAGNOSIS — G47.33 OSA (OBSTRUCTIVE SLEEP APNEA): Primary | Chronic | ICD-10-CM

## 2025-07-28 DIAGNOSIS — Z86.39 HX OF OBESITY: ICD-10-CM

## 2025-07-28 DIAGNOSIS — E66.3 OVERWEIGHT (BMI 25.0-29.9): ICD-10-CM

## 2025-07-28 ASSESSMENT — PAIN SCALES - GENERAL: PAINLEVEL_OUTOF10: NO PAIN (0)

## 2025-07-28 NOTE — Clinical Note
Doing well on Zepbound 15 mg pen, paying OOP with co-pay card for $650, discussed vial opt starting in August, he likes pen and will think about it.  Also discussed we have not tried coverage for severe HANNAH yet, placed order to see if can get covered.  Discussed options for maintenance dosing depending on coverage, if OPP then extending dose some can save money.  Mary 1/22, MTM per your recommendation - Nataly Trent, MTM

## 2025-07-28 NOTE — NURSING NOTE
Current patient location: work    Is the patient currently in the state of [place]? YES    Visit mode: VIDEO    If the visit is dropped, the patient can be reconnected by:VIDEO VISIT: Text to cell phone:   Telephone Information:   Mobile [phone]       Will anyone else be joining the visit? NO  (If patient encounters technical issues they should call [phone]    Are changes needed to the allergy or medication list? Pt stated no changes to allergies and Pt stated no med changes    Are refills needed on medications prescribed by this physician? NO    Rooming Documentation:  Not applicable      Reason for visit: Medication Therapy Management    Wt other than 24 hrs:  within last week   Pain more than one location:  no  Ryanne SOLER

## 2025-07-28 NOTE — PATIENT INSTRUCTIONS
Recommendations from MTM Pharmacist visit:                                                    MTM (medication therapy management) is a service provided by a clinical pharmacist designed to help you get the most of out of your medicines.  You may be sent a phone or email survey evaluating today's visit.  Please provide feedback you have for the service he received today if you are able.      Continue Zepbound 15 mg weekly   - order placed to [facility] pharmacy and will release once coverage determined   - consider transition to Zepbound vial for $499/ month, 12.5 and 15 mg vials starting to be offered in August     - review information (see below)     - will attempt coverage of Zepbound for your severe HANNAH     - [facility] or Prior Authorization team to communicate updates    Out-of-Pocket Options for Injectable Zepbound in 2025:  Zepbound pen: cost with savings card (link below to sign up): $650/box with card at any pharmacy: [email or web address]   Medicaid, Medicare or other government insurances cannot use savings card, cost is over $1000/box out of pocket otherwise  Zepbound vials through [facility] - vials only available in 2.5 mg, 5 mg, 7.5mg, 10 mg doses: [email or web address]  $349 per four 2.5 mg vials   $499 per four 5 mg vials  7.5 mg, 10 mg, 12.5 mg and 15 mg vials now available for $499/four vials with regular refills (cost increases if refills not consistently filled at least every 45 days - see more info at [employer] website)  Additional $5 per month for administrations supplies (syringe/needles, etc)    Try incorporating strengthening exercises to maintain muscle or reduce muscle loss with weight loss  - weights or resistance exercises can be helpful!     Protein goal:  20-30 grams protein / meal, minimum 60 gm, ideally 90 gm per day (consider tracking this)  - Try to eat within 60 min of waking up for  "the day, include protein   - Eat 3 meals with protein focus daily to help with nausea. If you forget to eat, you may feel nausea as a hunger cue.   - Start with protein and or veggie/fruit (fiber) portion of your meal, save starch portion for last   - Try to eat slowly (20 - 30 min per meal) to prevent getting overly full with Zepbound therapy     Fiber goal:  20 - 25 grams per day   -  could consider adding Metamucil (softens and bulks) / Citrucel (less gas generally) / Benefiber (gentle and dissolves easy) if needed to reach fiber goal, start low (5 grams per day) and increase slowly as tolerated, can cause some bloating / GI discomfort as your body adjusts to the increased fiber   - important to stay hydrated with fiber therapy and take consistently for best results  - light daily exercise can also help with bowel regularity      Follow-up:  - Next MTM per therapy changes or Mary recommendation at next visit  - [employer] message with scheduling tool      - Next provider visit: 1/22/26  Mary Childers PA-C      It was great speaking with you today.  I value your experience and would be very thankful for your time in providing feedback in our clinic survey. In the next few days, you may receive an email or text message from [facility] [employer] with a link to a survey related to your  clinical pharmacist.\"     To schedule another MTM appointment, please call the clinic directly ([facility] Phone Number: [phone] (schedules for [facility] and [facility] - providers, dietitians, health coaches) or you may call the MTM scheduling line at [phone] or toll-free at [phone].     My Clinical Pharmacist's contact information:                                                      Please feel free to contact me with any questions or concerns you have.      Nataly Mcgregor, PharmD    Medication Therapy Management Pharmacist   [facility] " "Clinic    Zepbound Vials Through [facility]    Zepbound is now available as single use vials at the 2.5 mg, 5 mg, 7.5 mg and 10 mg only. The single-dose vials are only sold in packages of 4 vials and only offered at cash price from [employer] [facility]. The prescription for the vials will be sent to the pharmacy directly. There are no plans from the manufacture to offer higher dosing in vials. They will either email or text you when [facility] has obtained the prescription to start the process to mail the prescription to you. You need to answer the questions from the email or text to complete the mailing order. You will need to say \"yes\" to obtaining the administration supplies (needle/syringes and alcohol wipes) when purchasing the Zepbound vials from [facility] - they will ask you about this when you start your order with them.     Zepbound Vial Dosing   Initiate 2.5 mg subcutaneously once weekly for at least 4 weeks. Can further dose escalate to 5 mg once weekly for at least 4 weeks, then option to further increase to 7.5 mg once weekly for 4 weeks, then option to further increase to 10 mg once weekly.   When to escalate dosing is individualized for each patient and should be discussed between provider and patient. If you are feeling clinically effective response with little to no side effects, would recommend staying at the dose you are at.     Zepbound Vial Administration Video:   Go to the below link and select \"vial\" tab above video. Written instructions with pictures also available on website below video.   [email or web address]    Zepbound Storage and Stability:   Make sure that when you get the prescription that you store the Zepbound vials in the refrigerator (good until expiration date on vial under refrigerated temperature). Once it is time to do your injection, remove only the single-dose vial needed for your injection and keep other vials in " the refrigerator until needed. Each single dose vial is good at room temperature for up to 21 days if necessary. If stored at room temperature, do not return to refrigerator. Discard vial if not used in 21 days after removing from refrigerator.     Zepbound Common Side Effects:   Nausea, diarrhea, constipation, headache, tiredness (fatigue), dizziness, stomach upset/pain. Less commonly, Zepbound can cause low blood sugar (symptoms: shaky, dizzy, sweaty, agitation). Please reach out to the care team should you feel like this is occurring. It is important to ensure that you are eating consistent meals and not skipping meals. Ensure you are getting at least 64 oz water daily.     Cost:   $349/4 single-dose 2.5 mg vials + $5 for administration supplies (4 needles/syringes, alcohol wipes)  $499/4 single-dose 5 mg vials + $5 for administration supplies (4 needles/syringes, alcohol wipes)  $499/4 single-dose 7.5 mg vials + $5 for administration supplies (4 needles/syringes, alcohol wipes) - so long as you fill every 45 day. If you fill outside of every 45 days, cost is $699 for 4 vials  $499/4 single-dose 10 mg vials + $5 for administration supplies (4 needles/syringes, alcohol wipes) - so long as you fill every 45 day. If you fill outside of every 45 days, cost is $699 for 4 vials  $499/4 vials coming August 2025 for 12.5 and 15 mg    More Information:   [email or web address]

## 2025-07-28 NOTE — TELEPHONE ENCOUNTER
The prescribed drug/drug class Zepbound is classified as a Plan Exclusion, meaning it is not covered under the plan for any indication.  Consequently, the liaison team will not be submitting a prior authorization for this drug or any drug within this Weight Loss Medication drug class. The order will be sent to the patient's preferred pharmacy, and the patient will need to pay out of pocket.        Eileen Valdez [facility] Liaison  [facility]  [email or web address]   [email or web address]  Phone: [phone]  Fax: [phone]

## 2025-07-28 NOTE — PROGRESS NOTES
Medication Therapy Management (MTM) Encounter    ASSESSMENT:                            Medication Adherence/Access:   Discussed Zepbound  [facility] will be expanding their vial program to include 12.5 and 15 mg doses for reduced price of $499 / 4 vials, he is currently using co-pay card at pharmacy for $650 / box of pens and may prefer to continue with pens for ease of administration. He will reach out if interested in vials.  Also, dicussed coverage for severe HANNAH has not yet been attempted, he would like to pursue this.     Weight Management/ severe HANNAH :   Speedy would benefit from  continuing pharmacotherapy with Zepbound 15 mg weekly for severe HANNAH and weight management given he is tolerating, is effective, and has history of obesity.  Recommend maintaining GLP1/GIP therapy as data to support most significant weight loss therapy available. Speedy is experiencing benefit from reduction in food noise, increased satiety and reduction of cravings. Education provided on continued dietary and behavioral modifications with focus on protein and fiber intake to optimize effectiveness of Zepbound and reduce constipation associated with therapy. Could consider Metamucil fiber therapy to meet fiber goal.     PLAN:                            Continue Zepbound 15 mg weekly   - order placed to [facility] pharmacy and will release once coverage determined   - consider transition to Zepbound vial for $499/ month, 12.5 and 15 mg vials starting to be offered in August     - review information    - will attempt coverage of Zepbound for your severe HANNAH     - [facility] or Prior Authorization team to communicate updates    Out-of-Pocket Options for Injectable Zepbound in 2025:  Zepbound pen: cost with savings card (link below to sign up): $650/box with card at any pharmacy: [email or web address]   Medicaid, Medicare or other government insurances cannot use savings card, cost is  over $1000/box out of pocket otherwise  Zepbound vials through [facility] - vials only available in 2.5 mg, 5 mg, 7.5mg, 10 mg doses: [email or web address]  $349 per four 2.5 mg vials   $499 per four 5 mg vials  7.5 mg, 10 mg, 12.5 mg and 15 mg vials now available for $499/four vials with regular refills (cost increases if refills not consistently filled at least every 45 days - see more info at [employer] website)  Additional $5 per month for administrations supplies (syringe/needles, etc)    Try incorporating strengthening exercises to maintain muscle or reduce muscle loss with weight loss  - weights or resistance exercises can be helpful!     Protein goal:  20-30 grams protein / meal, minimum 60 gm, ideally 90 gm per day (consider tracking this)  - Try to eat within 60 min of waking up for the day, include protein   - Eat 3 meals with protein focus daily to help with nausea. If you forget to eat, you may feel nausea as a hunger cue.   - Start with protein and or veggie/fruit (fiber) portion of your meal, save starch portion for last   - Try to eat slowly (20 - 30 min per meal) to prevent getting overly full with Zepbound therapy     Fiber goal:  20 - 25 grams per day   -  could consider adding Metamucil (softens and bulks) / Citrucel (less gas generally) / Benefiber (gentle and dissolves easy) if needed to reach fiber goal, start low (5 grams per day) and increase slowly as tolerated, can cause some bloating / GI discomfort as your body adjusts to the increased fiber   - important to stay hydrated with fiber therapy and take consistently for best results  - light daily exercise can also help with bowel regularity      Follow-up:  - Next MTM per therapy changes or Mary recommendation at next visit  - [employer] message with scheduling tool      - Next provider visit: 1/22/26  Mary Childers PA-C      SUBJECTIVE/OBJECTIVE:                          Issa Carlson  is a 64 year old male seen for a follow-up visit. He was referred to me from Mary Childers PA-C.     Reason for visit: medical weight management     Allergies/ADRs: Reviewed in chart  Past Medical History: Reviewed in chart  Tobacco: He reports that he has never smoked. He has never used smokeless tobacco.  Alcohol: 2-3 beverages / week  Caffeine: 4 Oz coffee each morning    Medication Adherence/Access:  - Patient takes medications directly from bottles.  - Patient takes medications 2 time(s) per day.   - Per patient, misses medication 1 time(s) per month.   - [facility]   - paying OOP using co-pay card, $650  - discussed vial form for $499, will think about it and reach out, likes the pens and doesn't like mailing due to loss of packages / concerns with getting warm     Weight Management /HANNAH   - Zepbound 15 mg once weekly on Tuesday  / Wednesday     - med start:  10/2023, dose start: 5/2025, weight at start: 242 lb   - Docusate 200 mg twice daily with large glass of water      No injection site reaction, no nausea, no vomiting, slight constipation (baseline: 1 BM / day, now 1 every other day and no straining), no diarrhea, no heartburn, no headache, and maybe slightly more fatigued.  Fiber is probably lacking at times.     Experiencing appetite suppression / portion reduction and reduction with food noise / cravings.  Discussed options for lower or extended dosing for maintenance.     Does not feel slight wearing off of medication effectiveness prior to next dose due.      Nutrition/Eating Habits:   - 07/28/25 Changes from last visit: could be not getting enough fiber and okay to focus on this, getting at least 60 grams protein per day  - 12/9/24 Meal times:     - Breakfast:  30 gram protein shake, OJ small glass    - Lunch:  cup of soup, turkey sandwich   - Dinner:  smaller now, chicken, turkey, grilled cheese/ soup   - Snacks:  cookies (oreo), PB on saltine cracker, chocolate (8-9 pm)  -  Hydration:  min 96 Oz water per day, caffeine free soda here and there   Sleep:  7 hours ( restful), uses implanted inspire device intermittently (hard to fall asleep due to the shock with it but sleeps better when able to fall asleep)  Exercise/Activity: walk dogs.   Medications Tried/Failed:  Contrave: effect wore off    - Next provider visit: 1/22/26  Mary Childers PA-C    - Last provider visit: 5/7/25 Mary Childers PA-C      Initial Consult Weight: 253 lb  Goal: <200 lb     Current weight today: 206 lbs 0 oz   Cumulative Weight Loss: - 47 lb, - 18.5 % from baseline  Cumulative Zepbound loss (242 lb): - 36 lb, - 14.8 %    Wt Readings from Last 5 Encounters:   07/28/25 206 lb (93.4 kg)   05/07/25 210 lb (95.3 kg)   04/11/25 209 lb (94.8 kg)   12/09/24 210 lb (95.3 kg)   11/21/24 211 lb 11.2 oz (96 kg)     Estimated body mass index is 27.94 kg/m  as calculated from the following:    Height as of this encounter: 6' (1.829 m).    Weight as of this encounter: 206 lb (93.4 kg).    Home Sleep Study: 04/16/2019 AHI 52, RDI/DEMETRIUS of 52 per hour. The low O2 sat is 72%. This is associated with continuous loud snoring and spiking desaturation and what might be a REM pattern           Today's Vitals: Ht 6' (1.829 m)   Wt 206 lb (93.4 kg)   BMI 27.94 kg/m    ----------------    I spent 31 minutes with this patient today. All changes were made via collaborative practice agreement with Mary Childers.     A summary of these recommendations was sent via [employer].    Nataly Mcgregor PharmD    Medication Therapy Management Pharmacist  [facility]      Telemedicine Visit Details  The patient's medications can be safely assessed via a telemedicine encounter.  Type of service:  Video Conference via [employer]  Originating Location (pt. Location): Home    Distant Location (provider location):  Off-site  Start Time: 11:03 AM  End Time: 11:34 am     Medication Therapy Recommendations  Hx of obesity   1  Current Medication: tirzepatide-Weight Management (ZEPBOUND) 15 MG/0.5ML prefilled pen   Current Medication Sig: Inject 0.5 mLs (15 mg) subcutaneously every 7 days.   Rationale: More cost-effective medication available - Cost - Adherence   Recommendation: Change Medication Formulation  - tirzepatide-weight management 15 MG/0.5ML Soln - change to vial for cost savings   Status: Patient Agreed - Adherence/Education   Identified Date: 7/28/2025 Completed Date: 7/28/2025

## 2025-07-29 NOTE — TELEPHONE ENCOUNTER
Sent MyChart to notify.     Nataly Mcgregor, PharmD    Medication Therapy Management Pharmacist  [facility]

## (undated) DEVICE — RETR RING LONE STAR 14.1X14.1CM 3307G

## (undated) DEVICE — PREP PAD ALCOHOL 6818

## (undated) DEVICE — LABEL MEDICATION SYSTEM 3303-P

## (undated) DEVICE — SOL NACL 0.9% IRRIG 1000ML BOTTLE 2F7124

## (undated) DEVICE — LINEN TOWEL PACK X5 5464

## (undated) DEVICE — BLADE KNIFE SURG 15 371115

## (undated) DEVICE — DRAPE POUCH IRR 1016

## (undated) DEVICE — SYR 01ML TBC SLIP TIP W/O NDL

## (undated) DEVICE — NDL 25GA 1.5" 305127

## (undated) DEVICE — RETR ELASTIC STAYS LONE STAR BLUNT DUAL LEAD 3550-1G

## (undated) DEVICE — DRAPE IOBAN INCISE 23X17" 6650EZ

## (undated) DEVICE — SYR 10ML FINGER CONTROL W/O NDL 309695

## (undated) DEVICE — SPONGE KITTNER 30-101

## (undated) DEVICE — PREP POVIDONE-IODINE 7.5% SCRUB 4OZ BOTTLE MDS093945

## (undated) DEVICE — SPONGE COTTONOID 1/2X1/2" 20-04S

## (undated) DEVICE — PREP POVIDONE IODINE SOLUTION 10% 4OZ BOTTLE 29906-004

## (undated) DEVICE — BLADE CLIPPER SGL USE 9680

## (undated) DEVICE — VESSEL LOOPS RED MINI 31145710

## (undated) DEVICE — SOL NACL 0.9% 10ML VIAL 0409-4888-02

## (undated) DEVICE — ANTIFOG SOLUTION W/FOAM PAD 31142527

## (undated) DEVICE — EYE PREP BETADINE 5% SOLUTION 30ML 0065-0411-30

## (undated) DEVICE — DRSG TEGADERM 2 3/8X2 3/4" 1624W

## (undated) DEVICE — GLOVE PROTEXIS POWDER FREE SMT 6.5  2D72PT65X

## (undated) DEVICE — SU SILK 3-0 TIE 12X30" A304H

## (undated) DEVICE — SPONGE COTTONOID 1/2X2" 20-32S

## (undated) DEVICE — PREP SKIN SCRUB TRAY 4461A

## (undated) DEVICE — NIM PROBE STIMULATOR SIDE-BY-SIDE BIPOLAR 8225401

## (undated) DEVICE — TONGUE DEPRESSOR STERILE 6023

## (undated) DEVICE — JELLY LUBRICATING SURGILUBE 2OZ TUBE

## (undated) DEVICE — SPONGE COTTONOID 1/2X1/2" 80-1400

## (undated) DEVICE — SU ETHILON 4-0 P-3 18" BLACK 699G

## (undated) DEVICE — GLOVE PROTEXIS BLUE W/NEU-THERA 6.5  2D73EB65

## (undated) DEVICE — DRAPE STERI FLUOROSCOPE 35X43"1012 LATEX FREE

## (undated) DEVICE — PACK ENT MINOR CUSTOM ASC

## (undated) DEVICE — DRSG KERLIX FLUFFS X5

## (undated) DEVICE — SYR 03ML LL W/O NDL 309657

## (undated) DEVICE — SU SILK 2-0 SH 30" K833H

## (undated) DEVICE — VIDEOSCOPE 3.8X1.2MM ASCOPE3 SLIM DISP SGL USE 402001000

## (undated) DEVICE — NDL ANGIOCATH 14GA 1.25" 3068

## (undated) DEVICE — DRAPE MICRO ZEISS PENTERO 120X54" G650DL

## (undated) DEVICE — SOL WATER IRRIG 1000ML BOTTLE 2F7114

## (undated) DEVICE — SU VICRYL 3-0 SH 8X18" UND J864D

## (undated) DEVICE — DRSG GAUZE 4X4" TRAY 6939

## (undated) DEVICE — SU SILK 3-0 SH CR 8X18" C013D

## (undated) DEVICE — TUNNELING TOOL AND OBTURATOR

## (undated) DEVICE — NDL ANGIOCATH 18GA 1.25" 4055

## (undated) DEVICE — COVER NEOPROBE SOFTFLEX 5X96" W/BANDS 20-PC596

## (undated) RX ORDER — PROPOFOL 10 MG/ML
INJECTION, EMULSION INTRAVENOUS
Status: DISPENSED
Start: 2020-10-28

## (undated) RX ORDER — HYDROMORPHONE HYDROCHLORIDE 1 MG/ML
INJECTION, SOLUTION INTRAMUSCULAR; INTRAVENOUS; SUBCUTANEOUS
Status: DISPENSED
Start: 2020-10-28

## (undated) RX ORDER — OXYCODONE HYDROCHLORIDE 5 MG/1
TABLET ORAL
Status: DISPENSED
Start: 2020-10-28

## (undated) RX ORDER — LIDOCAINE HYDROCHLORIDE 20 MG/ML
INJECTION, SOLUTION EPIDURAL; INFILTRATION; INTRACAUDAL; PERINEURAL
Status: DISPENSED
Start: 2020-10-28

## (undated) RX ORDER — GLYCOPYRROLATE 0.2 MG/ML
INJECTION INTRAMUSCULAR; INTRAVENOUS
Status: DISPENSED
Start: 2020-10-28

## (undated) RX ORDER — FENTANYL CITRATE-0.9 % NACL/PF 10 MCG/ML
PLASTIC BAG, INJECTION (ML) INTRAVENOUS
Status: DISPENSED
Start: 2020-10-28

## (undated) RX ORDER — CEFAZOLIN SODIUM 1 G/3ML
INJECTION, POWDER, FOR SOLUTION INTRAMUSCULAR; INTRAVENOUS
Status: DISPENSED
Start: 2020-10-28

## (undated) RX ORDER — BUPIVACAINE HYDROCHLORIDE 2.5 MG/ML
INJECTION, SOLUTION EPIDURAL; INFILTRATION; INTRACAUDAL
Status: DISPENSED
Start: 2020-10-28

## (undated) RX ORDER — ACETAMINOPHEN 325 MG/1
TABLET ORAL
Status: DISPENSED
Start: 2020-06-03

## (undated) RX ORDER — KETOROLAC TROMETHAMINE 30 MG/ML
INJECTION, SOLUTION INTRAMUSCULAR; INTRAVENOUS
Status: DISPENSED
Start: 2020-10-28

## (undated) RX ORDER — DEXAMETHASONE SODIUM PHOSPHATE 4 MG/ML
INJECTION, SOLUTION INTRA-ARTICULAR; INTRALESIONAL; INTRAMUSCULAR; INTRAVENOUS; SOFT TISSUE
Status: DISPENSED
Start: 2020-10-28

## (undated) RX ORDER — ONDANSETRON 2 MG/ML
INJECTION INTRAMUSCULAR; INTRAVENOUS
Status: DISPENSED
Start: 2020-10-28

## (undated) RX ORDER — REMIFENTANIL HYDROCHLORIDE 1 MG/ML
INJECTION, POWDER, LYOPHILIZED, FOR SOLUTION INTRAVENOUS
Status: DISPENSED
Start: 2020-10-28

## (undated) RX ORDER — PROPOFOL 10 MG/ML
INJECTION, EMULSION INTRAVENOUS
Status: DISPENSED
Start: 2020-06-03

## (undated) RX ORDER — LIDOCAINE HYDROCHLORIDE AND EPINEPHRINE 10; 10 MG/ML; UG/ML
INJECTION, SOLUTION INFILTRATION; PERINEURAL
Status: DISPENSED
Start: 2020-10-28

## (undated) RX ORDER — FENTANYL CITRATE 50 UG/ML
INJECTION, SOLUTION INTRAMUSCULAR; INTRAVENOUS
Status: DISPENSED
Start: 2020-10-28

## (undated) RX ORDER — EPINEPHRINE 1 MG/ML
INJECTION, SOLUTION, CONCENTRATE INTRAVENOUS
Status: DISPENSED
Start: 2020-10-28

## (undated) RX ORDER — ACETAMINOPHEN 325 MG/1
TABLET ORAL
Status: DISPENSED
Start: 2020-10-28